# Patient Record
Sex: FEMALE | Race: WHITE | NOT HISPANIC OR LATINO | Employment: OTHER | URBAN - METROPOLITAN AREA
[De-identification: names, ages, dates, MRNs, and addresses within clinical notes are randomized per-mention and may not be internally consistent; named-entity substitution may affect disease eponyms.]

---

## 2021-05-13 ENCOUNTER — TELEPHONE (OUTPATIENT)
Dept: INTERNAL MEDICINE | Facility: CLINIC | Age: 61
End: 2021-05-13

## 2021-05-18 ENCOUNTER — LAB VISIT (OUTPATIENT)
Dept: LAB | Facility: HOSPITAL | Age: 61
End: 2021-05-18
Attending: FAMILY MEDICINE
Payer: COMMERCIAL

## 2021-05-18 ENCOUNTER — OFFICE VISIT (OUTPATIENT)
Dept: INTERNAL MEDICINE | Facility: CLINIC | Age: 61
End: 2021-05-18
Payer: COMMERCIAL

## 2021-05-18 VITALS
WEIGHT: 143.94 LBS | OXYGEN SATURATION: 96 % | HEART RATE: 98 BPM | SYSTOLIC BLOOD PRESSURE: 121 MMHG | DIASTOLIC BLOOD PRESSURE: 80 MMHG | TEMPERATURE: 98 F

## 2021-05-18 DIAGNOSIS — E78.5 HYPERLIPIDEMIA, UNSPECIFIED HYPERLIPIDEMIA TYPE: ICD-10-CM

## 2021-05-18 DIAGNOSIS — G40.909 SEIZURE DISORDER: ICD-10-CM

## 2021-05-18 DIAGNOSIS — F41.1 GAD (GENERALIZED ANXIETY DISORDER): ICD-10-CM

## 2021-05-18 DIAGNOSIS — J44.9 CHRONIC OBSTRUCTIVE PULMONARY DISEASE, UNSPECIFIED COPD TYPE: ICD-10-CM

## 2021-05-18 DIAGNOSIS — E87.5 HYPERKALEMIA: ICD-10-CM

## 2021-05-18 DIAGNOSIS — I10 HYPERTENSION, UNSPECIFIED TYPE: Primary | ICD-10-CM

## 2021-05-18 LAB
ALBUMIN SERPL BCP-MCNC: 3.6 G/DL (ref 3.5–5.2)
ALP SERPL-CCNC: 113 U/L (ref 55–135)
ALT SERPL W/O P-5'-P-CCNC: 26 U/L (ref 10–44)
ANION GAP SERPL CALC-SCNC: 11 MMOL/L (ref 8–16)
AST SERPL-CCNC: 18 U/L (ref 10–40)
BILIRUB SERPL-MCNC: 0.1 MG/DL (ref 0.1–1)
BUN SERPL-MCNC: 12 MG/DL (ref 8–23)
CALCIUM SERPL-MCNC: 9.7 MG/DL (ref 8.7–10.5)
CHLORIDE SERPL-SCNC: 99 MMOL/L (ref 95–110)
CO2 SERPL-SCNC: 26 MMOL/L (ref 23–29)
CREAT SERPL-MCNC: 0.7 MG/DL (ref 0.5–1.4)
EST. GFR  (AFRICAN AMERICAN): >60 ML/MIN/1.73 M^2
EST. GFR  (NON AFRICAN AMERICAN): >60 ML/MIN/1.73 M^2
GLUCOSE SERPL-MCNC: 85 MG/DL (ref 70–110)
POTASSIUM SERPL-SCNC: 4.5 MMOL/L (ref 3.5–5.1)
PROT SERPL-MCNC: 7.2 G/DL (ref 6–8.4)
SODIUM SERPL-SCNC: 136 MMOL/L (ref 136–145)

## 2021-05-18 PROCEDURE — 3079F DIAST BP 80-89 MM HG: CPT | Mod: CPTII,S$GLB,, | Performed by: FAMILY MEDICINE

## 2021-05-18 PROCEDURE — 1126F PR PAIN SEVERITY QUANTIFIED, NO PAIN PRESENT: ICD-10-PCS | Mod: S$GLB,,, | Performed by: FAMILY MEDICINE

## 2021-05-18 PROCEDURE — 99999 PR PBB SHADOW E&M-EST. PATIENT-LVL IV: CPT | Mod: PBBFAC,,, | Performed by: FAMILY MEDICINE

## 2021-05-18 PROCEDURE — 3074F SYST BP LT 130 MM HG: CPT | Mod: CPTII,S$GLB,, | Performed by: FAMILY MEDICINE

## 2021-05-18 PROCEDURE — 80053 COMPREHEN METABOLIC PANEL: CPT | Performed by: FAMILY MEDICINE

## 2021-05-18 PROCEDURE — 3079F PR MOST RECENT DIASTOLIC BLOOD PRESSURE 80-89 MM HG: ICD-10-PCS | Mod: CPTII,S$GLB,, | Performed by: FAMILY MEDICINE

## 2021-05-18 PROCEDURE — 36415 COLL VENOUS BLD VENIPUNCTURE: CPT | Mod: PO | Performed by: FAMILY MEDICINE

## 2021-05-18 PROCEDURE — 99999 PR PBB SHADOW E&M-EST. PATIENT-LVL IV: ICD-10-PCS | Mod: PBBFAC,,, | Performed by: FAMILY MEDICINE

## 2021-05-18 PROCEDURE — 3074F PR MOST RECENT SYSTOLIC BLOOD PRESSURE < 130 MM HG: ICD-10-PCS | Mod: CPTII,S$GLB,, | Performed by: FAMILY MEDICINE

## 2021-05-18 PROCEDURE — 1126F AMNT PAIN NOTED NONE PRSNT: CPT | Mod: S$GLB,,, | Performed by: FAMILY MEDICINE

## 2021-05-18 PROCEDURE — 99204 PR OFFICE/OUTPT VISIT, NEW, LEVL IV, 45-59 MIN: ICD-10-PCS | Mod: S$GLB,,, | Performed by: FAMILY MEDICINE

## 2021-05-18 PROCEDURE — 99204 OFFICE O/P NEW MOD 45 MIN: CPT | Mod: S$GLB,,, | Performed by: FAMILY MEDICINE

## 2021-05-18 RX ORDER — CLOPIDOGREL BISULFATE 75 MG/1
75 TABLET ORAL
COMMUNITY
Start: 2021-01-19 | End: 2021-11-18 | Stop reason: SDUPTHER

## 2021-05-18 RX ORDER — CARBAMAZEPINE 200 MG/1
200 TABLET ORAL 2 TIMES DAILY
COMMUNITY
Start: 2021-03-29

## 2021-05-18 RX ORDER — BIMATOPROST 0.3 MG/ML
1 SOLUTION/ DROPS OPHTHALMIC
COMMUNITY

## 2021-05-18 RX ORDER — ESTERIFIED ESTROGEN AND METHYLTESTOSTERONE .625; 1.25 MG/1; MG/1
1 TABLET ORAL
COMMUNITY
End: 2021-05-18

## 2021-05-18 RX ORDER — BUDESONIDE AND FORMOTEROL FUMARATE DIHYDRATE 160; 4.5 UG/1; UG/1
2 AEROSOL RESPIRATORY (INHALATION)
COMMUNITY
End: 2021-08-24 | Stop reason: SDUPTHER

## 2021-05-18 RX ORDER — SERTRALINE HYDROCHLORIDE 50 MG/1
50 TABLET, FILM COATED ORAL DAILY
Qty: 30 TABLET | Refills: 1 | Status: SHIPPED | OUTPATIENT
Start: 2021-05-18 | End: 2021-06-18

## 2021-05-18 RX ORDER — LINACLOTIDE 290 UG/1
290 CAPSULE, GELATIN COATED ORAL DAILY
COMMUNITY
Start: 2021-05-13 | End: 2021-10-19 | Stop reason: SDUPTHER

## 2021-05-18 RX ORDER — LISINOPRIL AND HYDROCHLOROTHIAZIDE 10; 12.5 MG/1; MG/1
1 TABLET ORAL DAILY
COMMUNITY
Start: 2021-04-12 | End: 2022-12-12 | Stop reason: SDUPTHER

## 2021-05-18 RX ORDER — CLONAZEPAM 1 MG/1
1 TABLET ORAL 2 TIMES DAILY PRN
Qty: 30 TABLET | Refills: 0 | Status: SHIPPED | OUTPATIENT
Start: 2021-05-18 | End: 2022-12-12 | Stop reason: SDUPTHER

## 2021-05-18 RX ORDER — CLONAZEPAM 1 MG/1
1 TABLET ORAL 2 TIMES DAILY PRN
COMMUNITY
Start: 2021-03-22 | End: 2021-05-18 | Stop reason: SDUPTHER

## 2021-05-18 RX ORDER — ALBUTEROL SULFATE 90 UG/1
AEROSOL, METERED RESPIRATORY (INHALATION)
COMMUNITY
End: 2021-08-24 | Stop reason: SDUPTHER

## 2021-05-18 RX ORDER — ROSUVASTATIN CALCIUM 40 MG/1
40 TABLET, COATED ORAL
COMMUNITY
Start: 2021-03-02 | End: 2022-12-12 | Stop reason: SDUPTHER

## 2021-05-18 RX ORDER — VERAPAMIL HYDROCHLORIDE 120 MG/1
TABLET, FILM COATED, EXTENDED RELEASE ORAL
COMMUNITY
Start: 2020-07-01 | End: 2021-06-18 | Stop reason: SDUPTHER

## 2021-05-19 ENCOUNTER — TELEPHONE (OUTPATIENT)
Dept: INTERNAL MEDICINE | Facility: CLINIC | Age: 61
End: 2021-05-19

## 2021-05-26 ENCOUNTER — PATIENT OUTREACH (OUTPATIENT)
Dept: ADMINISTRATIVE | Facility: HOSPITAL | Age: 61
End: 2021-05-26

## 2021-06-18 ENCOUNTER — OFFICE VISIT (OUTPATIENT)
Dept: INTERNAL MEDICINE | Facility: CLINIC | Age: 61
End: 2021-06-18
Payer: COMMERCIAL

## 2021-06-18 VITALS
DIASTOLIC BLOOD PRESSURE: 86 MMHG | BODY MASS INDEX: 27.52 KG/M2 | HEART RATE: 88 BPM | SYSTOLIC BLOOD PRESSURE: 136 MMHG | RESPIRATION RATE: 18 BRPM | WEIGHT: 140.19 LBS | TEMPERATURE: 98 F | HEIGHT: 60 IN

## 2021-06-18 DIAGNOSIS — J44.9 CHRONIC OBSTRUCTIVE PULMONARY DISEASE, UNSPECIFIED COPD TYPE: ICD-10-CM

## 2021-06-18 DIAGNOSIS — I10 ESSENTIAL HYPERTENSION: ICD-10-CM

## 2021-06-18 DIAGNOSIS — K58.1 IRRITABLE BOWEL SYNDROME WITH CONSTIPATION: ICD-10-CM

## 2021-06-18 DIAGNOSIS — F41.1 GAD (GENERALIZED ANXIETY DISORDER): Primary | ICD-10-CM

## 2021-06-18 PROCEDURE — 99214 PR OFFICE/OUTPT VISIT, EST, LEVL IV, 30-39 MIN: ICD-10-PCS | Mod: S$GLB,,, | Performed by: NURSE PRACTITIONER

## 2021-06-18 PROCEDURE — 3008F PR BODY MASS INDEX (BMI) DOCUMENTED: ICD-10-PCS | Mod: CPTII,S$GLB,, | Performed by: NURSE PRACTITIONER

## 2021-06-18 PROCEDURE — 99999 PR PBB SHADOW E&M-EST. PATIENT-LVL IV: ICD-10-PCS | Mod: PBBFAC,,, | Performed by: NURSE PRACTITIONER

## 2021-06-18 PROCEDURE — 1126F PR PAIN SEVERITY QUANTIFIED, NO PAIN PRESENT: ICD-10-PCS | Mod: S$GLB,,, | Performed by: NURSE PRACTITIONER

## 2021-06-18 PROCEDURE — 1126F AMNT PAIN NOTED NONE PRSNT: CPT | Mod: S$GLB,,, | Performed by: NURSE PRACTITIONER

## 2021-06-18 PROCEDURE — 99214 OFFICE O/P EST MOD 30 MIN: CPT | Mod: S$GLB,,, | Performed by: NURSE PRACTITIONER

## 2021-06-18 PROCEDURE — 99999 PR PBB SHADOW E&M-EST. PATIENT-LVL IV: CPT | Mod: PBBFAC,,, | Performed by: NURSE PRACTITIONER

## 2021-06-18 PROCEDURE — 3008F BODY MASS INDEX DOCD: CPT | Mod: CPTII,S$GLB,, | Performed by: NURSE PRACTITIONER

## 2021-06-18 RX ORDER — VERAPAMIL HYDROCHLORIDE 120 MG/1
120 TABLET, FILM COATED, EXTENDED RELEASE ORAL DAILY
Status: CANCELLED | OUTPATIENT
Start: 2021-06-18

## 2021-06-18 RX ORDER — SERTRALINE HYDROCHLORIDE 100 MG/1
100 TABLET, FILM COATED ORAL DAILY
Qty: 30 TABLET | Refills: 1 | Status: SHIPPED | OUTPATIENT
Start: 2021-06-18 | End: 2021-08-06

## 2021-06-18 RX ORDER — VERAPAMIL HYDROCHLORIDE 120 MG/1
120 TABLET, FILM COATED, EXTENDED RELEASE ORAL DAILY
Qty: 90 TABLET | Refills: 3 | Status: SHIPPED | OUTPATIENT
Start: 2021-06-18 | End: 2022-06-06 | Stop reason: SDUPTHER

## 2021-08-11 ENCOUNTER — OFFICE VISIT (OUTPATIENT)
Dept: INTERNAL MEDICINE | Facility: CLINIC | Age: 61
End: 2021-08-11
Payer: COMMERCIAL

## 2021-08-11 VITALS
DIASTOLIC BLOOD PRESSURE: 78 MMHG | TEMPERATURE: 97 F | OXYGEN SATURATION: 97 % | SYSTOLIC BLOOD PRESSURE: 122 MMHG | BODY MASS INDEX: 26.35 KG/M2 | HEART RATE: 78 BPM | WEIGHT: 134.94 LBS

## 2021-08-11 DIAGNOSIS — Z51.81 MEDICATION MONITORING ENCOUNTER: ICD-10-CM

## 2021-08-11 DIAGNOSIS — Z00.00 ANNUAL PHYSICAL EXAM: ICD-10-CM

## 2021-08-11 DIAGNOSIS — G40.909 SEIZURE DISORDER: ICD-10-CM

## 2021-08-11 DIAGNOSIS — E78.5 HYPERLIPIDEMIA, UNSPECIFIED HYPERLIPIDEMIA TYPE: ICD-10-CM

## 2021-08-11 DIAGNOSIS — I10 HYPERTENSION, UNSPECIFIED TYPE: ICD-10-CM

## 2021-08-11 DIAGNOSIS — F41.1 GAD (GENERALIZED ANXIETY DISORDER): Primary | ICD-10-CM

## 2021-08-11 PROCEDURE — 3008F BODY MASS INDEX DOCD: CPT | Mod: CPTII,S$GLB,, | Performed by: FAMILY MEDICINE

## 2021-08-11 PROCEDURE — 99999 PR PBB SHADOW E&M-EST. PATIENT-LVL IV: ICD-10-PCS | Mod: PBBFAC,,, | Performed by: FAMILY MEDICINE

## 2021-08-11 PROCEDURE — 3078F DIAST BP <80 MM HG: CPT | Mod: CPTII,S$GLB,, | Performed by: FAMILY MEDICINE

## 2021-08-11 PROCEDURE — 1126F PR PAIN SEVERITY QUANTIFIED, NO PAIN PRESENT: ICD-10-PCS | Mod: CPTII,S$GLB,, | Performed by: FAMILY MEDICINE

## 2021-08-11 PROCEDURE — 1159F MED LIST DOCD IN RCRD: CPT | Mod: CPTII,S$GLB,, | Performed by: FAMILY MEDICINE

## 2021-08-11 PROCEDURE — 1160F RVW MEDS BY RX/DR IN RCRD: CPT | Mod: CPTII,S$GLB,, | Performed by: FAMILY MEDICINE

## 2021-08-11 PROCEDURE — 1160F PR REVIEW ALL MEDS BY PRESCRIBER/CLIN PHARMACIST DOCUMENTED: ICD-10-PCS | Mod: CPTII,S$GLB,, | Performed by: FAMILY MEDICINE

## 2021-08-11 PROCEDURE — 3074F SYST BP LT 130 MM HG: CPT | Mod: CPTII,S$GLB,, | Performed by: FAMILY MEDICINE

## 2021-08-11 PROCEDURE — 3008F PR BODY MASS INDEX (BMI) DOCUMENTED: ICD-10-PCS | Mod: CPTII,S$GLB,, | Performed by: FAMILY MEDICINE

## 2021-08-11 PROCEDURE — 3074F PR MOST RECENT SYSTOLIC BLOOD PRESSURE < 130 MM HG: ICD-10-PCS | Mod: CPTII,S$GLB,, | Performed by: FAMILY MEDICINE

## 2021-08-11 PROCEDURE — 99999 PR PBB SHADOW E&M-EST. PATIENT-LVL IV: CPT | Mod: PBBFAC,,, | Performed by: FAMILY MEDICINE

## 2021-08-11 PROCEDURE — 1159F PR MEDICATION LIST DOCUMENTED IN MEDICAL RECORD: ICD-10-PCS | Mod: CPTII,S$GLB,, | Performed by: FAMILY MEDICINE

## 2021-08-11 PROCEDURE — 99214 PR OFFICE/OUTPT VISIT, EST, LEVL IV, 30-39 MIN: ICD-10-PCS | Mod: S$GLB,,, | Performed by: FAMILY MEDICINE

## 2021-08-11 PROCEDURE — 99214 OFFICE O/P EST MOD 30 MIN: CPT | Mod: S$GLB,,, | Performed by: FAMILY MEDICINE

## 2021-08-11 PROCEDURE — 1126F AMNT PAIN NOTED NONE PRSNT: CPT | Mod: CPTII,S$GLB,, | Performed by: FAMILY MEDICINE

## 2021-08-11 PROCEDURE — 3078F PR MOST RECENT DIASTOLIC BLOOD PRESSURE < 80 MM HG: ICD-10-PCS | Mod: CPTII,S$GLB,, | Performed by: FAMILY MEDICINE

## 2021-08-11 RX ORDER — SERTRALINE HYDROCHLORIDE 100 MG/1
100 TABLET, FILM COATED ORAL DAILY
Qty: 90 TABLET | Refills: 2 | Status: SHIPPED | OUTPATIENT
Start: 2021-08-11 | End: 2022-06-06

## 2021-08-13 ENCOUNTER — IMMUNIZATION (OUTPATIENT)
Dept: PRIMARY CARE CLINIC | Facility: CLINIC | Age: 61
End: 2021-08-13

## 2021-08-13 DIAGNOSIS — Z23 NEED FOR VACCINATION: Primary | ICD-10-CM

## 2021-08-13 PROCEDURE — 0001A COVID-19, MRNA, LNP-S, PF, 30 MCG/0.3 ML DOSE VACCINE: CPT | Mod: CV19,S$GLB,, | Performed by: FAMILY MEDICINE

## 2021-08-13 PROCEDURE — 91300 COVID-19, MRNA, LNP-S, PF, 30 MCG/0.3 ML DOSE VACCINE: ICD-10-PCS | Mod: S$GLB,,, | Performed by: FAMILY MEDICINE

## 2021-08-13 PROCEDURE — 0001A COVID-19, MRNA, LNP-S, PF, 30 MCG/0.3 ML DOSE VACCINE: ICD-10-PCS | Mod: CV19,S$GLB,, | Performed by: FAMILY MEDICINE

## 2021-08-13 PROCEDURE — 91300 COVID-19, MRNA, LNP-S, PF, 30 MCG/0.3 ML DOSE VACCINE: CPT | Mod: S$GLB,,, | Performed by: FAMILY MEDICINE

## 2021-08-18 ENCOUNTER — LAB VISIT (OUTPATIENT)
Dept: LAB | Facility: HOSPITAL | Age: 61
End: 2021-08-18
Attending: FAMILY MEDICINE
Payer: COMMERCIAL

## 2021-08-18 DIAGNOSIS — Z00.00 ANNUAL PHYSICAL EXAM: ICD-10-CM

## 2021-08-18 DIAGNOSIS — G40.909 SEIZURE DISORDER: ICD-10-CM

## 2021-08-18 DIAGNOSIS — Z51.81 MEDICATION MONITORING ENCOUNTER: ICD-10-CM

## 2021-08-18 LAB
BASOPHILS # BLD AUTO: 0.03 K/UL (ref 0–0.2)
BASOPHILS NFR BLD: 0.4 % (ref 0–1.9)
DIFFERENTIAL METHOD: ABNORMAL
EOSINOPHIL # BLD AUTO: 0.1 K/UL (ref 0–0.5)
EOSINOPHIL NFR BLD: 1.5 % (ref 0–8)
ERYTHROCYTE [DISTWIDTH] IN BLOOD BY AUTOMATED COUNT: 14.2 % (ref 11.5–14.5)
ESTIMATED AVG GLUCOSE: 94 MG/DL (ref 68–131)
HBA1C MFR BLD: 4.9 % (ref 4–5.6)
HCT VFR BLD AUTO: 44.9 % (ref 37–48.5)
HGB BLD-MCNC: 15.5 G/DL (ref 12–16)
IMM GRANULOCYTES # BLD AUTO: 0.02 K/UL (ref 0–0.04)
IMM GRANULOCYTES NFR BLD AUTO: 0.3 % (ref 0–0.5)
LYMPHOCYTES # BLD AUTO: 1.8 K/UL (ref 1–4.8)
LYMPHOCYTES NFR BLD: 24.9 % (ref 18–48)
MCH RBC QN AUTO: 32.4 PG (ref 27–31)
MCHC RBC AUTO-ENTMCNC: 34.5 G/DL (ref 32–36)
MCV RBC AUTO: 94 FL (ref 82–98)
MONOCYTES # BLD AUTO: 0.5 K/UL (ref 0.3–1)
MONOCYTES NFR BLD: 6.9 % (ref 4–15)
NEUTROPHILS # BLD AUTO: 4.7 K/UL (ref 1.8–7.7)
NEUTROPHILS NFR BLD: 66 % (ref 38–73)
NRBC BLD-RTO: 0 /100 WBC
PLATELET # BLD AUTO: 338 K/UL (ref 150–450)
PMV BLD AUTO: 10.8 FL (ref 9.2–12.9)
RBC # BLD AUTO: 4.78 M/UL (ref 4–5.4)
WBC # BLD AUTO: 7.12 K/UL (ref 3.9–12.7)

## 2021-08-18 PROCEDURE — 80053 COMPREHEN METABOLIC PANEL: CPT | Performed by: FAMILY MEDICINE

## 2021-08-18 PROCEDURE — 84443 ASSAY THYROID STIM HORMONE: CPT | Performed by: FAMILY MEDICINE

## 2021-08-18 PROCEDURE — 87389 HIV-1 AG W/HIV-1&-2 AB AG IA: CPT | Performed by: FAMILY MEDICINE

## 2021-08-18 PROCEDURE — 82306 VITAMIN D 25 HYDROXY: CPT | Performed by: FAMILY MEDICINE

## 2021-08-18 PROCEDURE — 80156 ASSAY CARBAMAZEPINE TOTAL: CPT | Performed by: FAMILY MEDICINE

## 2021-08-18 PROCEDURE — 83036 HEMOGLOBIN GLYCOSYLATED A1C: CPT | Performed by: FAMILY MEDICINE

## 2021-08-18 PROCEDURE — 36415 COLL VENOUS BLD VENIPUNCTURE: CPT | Mod: PO | Performed by: FAMILY MEDICINE

## 2021-08-18 PROCEDURE — 86803 HEPATITIS C AB TEST: CPT | Performed by: FAMILY MEDICINE

## 2021-08-18 PROCEDURE — 85025 COMPLETE CBC W/AUTO DIFF WBC: CPT | Performed by: FAMILY MEDICINE

## 2021-08-18 PROCEDURE — 80061 LIPID PANEL: CPT | Performed by: FAMILY MEDICINE

## 2021-08-19 LAB
25(OH)D3+25(OH)D2 SERPL-MCNC: 42 NG/ML (ref 30–96)
ALBUMIN SERPL BCP-MCNC: 3.8 G/DL (ref 3.5–5.2)
ALP SERPL-CCNC: 111 U/L (ref 55–135)
ALT SERPL W/O P-5'-P-CCNC: 24 U/L (ref 10–44)
ANION GAP SERPL CALC-SCNC: 11 MMOL/L (ref 8–16)
AST SERPL-CCNC: 18 U/L (ref 10–40)
BILIRUB SERPL-MCNC: 0.3 MG/DL (ref 0.1–1)
BUN SERPL-MCNC: 12 MG/DL (ref 8–23)
CALCIUM SERPL-MCNC: 10.1 MG/DL (ref 8.7–10.5)
CARBAMAZEPINE SERPL-MCNC: 6.3 UG/ML (ref 4–12)
CHLORIDE SERPL-SCNC: 103 MMOL/L (ref 95–110)
CHOLEST SERPL-MCNC: 229 MG/DL (ref 120–199)
CHOLEST/HDLC SERPL: 5.9 {RATIO} (ref 2–5)
CO2 SERPL-SCNC: 26 MMOL/L (ref 23–29)
CREAT SERPL-MCNC: 0.7 MG/DL (ref 0.5–1.4)
EST. GFR  (AFRICAN AMERICAN): >60 ML/MIN/1.73 M^2
EST. GFR  (NON AFRICAN AMERICAN): >60 ML/MIN/1.73 M^2
GLUCOSE SERPL-MCNC: 82 MG/DL (ref 70–110)
HCV AB SERPL QL IA: NEGATIVE
HDLC SERPL-MCNC: 39 MG/DL (ref 40–75)
HDLC SERPL: 17 % (ref 20–50)
HIV 1+2 AB+HIV1 P24 AG SERPL QL IA: NEGATIVE
LDLC SERPL CALC-MCNC: 152 MG/DL (ref 63–159)
NONHDLC SERPL-MCNC: 190 MG/DL
POTASSIUM SERPL-SCNC: 4.5 MMOL/L (ref 3.5–5.1)
PROT SERPL-MCNC: 7.4 G/DL (ref 6–8.4)
SODIUM SERPL-SCNC: 140 MMOL/L (ref 136–145)
TRIGL SERPL-MCNC: 190 MG/DL (ref 30–150)
TSH SERPL DL<=0.005 MIU/L-ACNC: 2.72 UIU/ML (ref 0.4–4)

## 2021-08-24 ENCOUNTER — TELEPHONE (OUTPATIENT)
Dept: INTERNAL MEDICINE | Facility: CLINIC | Age: 61
End: 2021-08-24

## 2021-08-24 ENCOUNTER — OFFICE VISIT (OUTPATIENT)
Dept: INTERNAL MEDICINE | Facility: CLINIC | Age: 61
End: 2021-08-24
Payer: COMMERCIAL

## 2021-08-24 DIAGNOSIS — I10 HYPERTENSION, UNSPECIFIED TYPE: ICD-10-CM

## 2021-08-24 DIAGNOSIS — F41.1 GAD (GENERALIZED ANXIETY DISORDER): ICD-10-CM

## 2021-08-24 DIAGNOSIS — J06.9 VIRAL URI WITH COUGH: Primary | ICD-10-CM

## 2021-08-24 DIAGNOSIS — J44.9 CHRONIC OBSTRUCTIVE PULMONARY DISEASE, UNSPECIFIED COPD TYPE: ICD-10-CM

## 2021-08-24 DIAGNOSIS — E78.5 HYPERLIPIDEMIA, UNSPECIFIED HYPERLIPIDEMIA TYPE: ICD-10-CM

## 2021-08-24 PROCEDURE — 3044F PR MOST RECENT HEMOGLOBIN A1C LEVEL <7.0%: ICD-10-PCS | Mod: CPTII,,, | Performed by: NURSE PRACTITIONER

## 2021-08-24 PROCEDURE — 1160F RVW MEDS BY RX/DR IN RCRD: CPT | Mod: CPTII,,, | Performed by: NURSE PRACTITIONER

## 2021-08-24 PROCEDURE — 3044F HG A1C LEVEL LT 7.0%: CPT | Mod: CPTII,,, | Performed by: NURSE PRACTITIONER

## 2021-08-24 PROCEDURE — 99214 OFFICE O/P EST MOD 30 MIN: CPT | Mod: 95,,, | Performed by: NURSE PRACTITIONER

## 2021-08-24 PROCEDURE — 1159F PR MEDICATION LIST DOCUMENTED IN MEDICAL RECORD: ICD-10-PCS | Mod: CPTII,,, | Performed by: NURSE PRACTITIONER

## 2021-08-24 PROCEDURE — 99214 PR OFFICE/OUTPT VISIT, EST, LEVL IV, 30-39 MIN: ICD-10-PCS | Mod: 95,,, | Performed by: NURSE PRACTITIONER

## 2021-08-24 PROCEDURE — 1160F PR REVIEW ALL MEDS BY PRESCRIBER/CLIN PHARMACIST DOCUMENTED: ICD-10-PCS | Mod: CPTII,,, | Performed by: NURSE PRACTITIONER

## 2021-08-24 PROCEDURE — 1159F MED LIST DOCD IN RCRD: CPT | Mod: CPTII,,, | Performed by: NURSE PRACTITIONER

## 2021-08-24 RX ORDER — ALBUTEROL SULFATE 90 UG/1
2 AEROSOL, METERED RESPIRATORY (INHALATION) EVERY 4 HOURS PRN
Qty: 18 G | Refills: 2 | Status: SHIPPED | OUTPATIENT
Start: 2021-08-24 | End: 2021-11-08

## 2021-08-24 RX ORDER — PROMETHAZINE HYDROCHLORIDE AND DEXTROMETHORPHAN HYDROBROMIDE 6.25; 15 MG/5ML; MG/5ML
5 SYRUP ORAL NIGHTLY PRN
Qty: 120 ML | Refills: 0 | Status: SHIPPED | OUTPATIENT
Start: 2021-08-24 | End: 2022-02-11 | Stop reason: SDUPTHER

## 2021-08-24 RX ORDER — BUDESONIDE AND FORMOTEROL FUMARATE DIHYDRATE 160; 4.5 UG/1; UG/1
2 AEROSOL RESPIRATORY (INHALATION) EVERY 12 HOURS
Qty: 10.2 G | Refills: 11 | Status: SHIPPED | OUTPATIENT
Start: 2021-08-24 | End: 2022-12-12 | Stop reason: SDUPTHER

## 2021-08-25 ENCOUNTER — LAB VISIT (OUTPATIENT)
Dept: PRIMARY CARE CLINIC | Facility: OTHER | Age: 61
End: 2021-08-25
Payer: COMMERCIAL

## 2021-08-25 DIAGNOSIS — R05.9 COUGH: ICD-10-CM

## 2021-08-25 PROCEDURE — U0003 INFECTIOUS AGENT DETECTION BY NUCLEIC ACID (DNA OR RNA); SEVERE ACUTE RESPIRATORY SYNDROME CORONAVIRUS 2 (SARS-COV-2) (CORONAVIRUS DISEASE [COVID-19]), AMPLIFIED PROBE TECHNIQUE, MAKING USE OF HIGH THROUGHPUT TECHNOLOGIES AS DESCRIBED BY CMS-2020-01-R: HCPCS

## 2021-08-27 LAB — SARS-COV-2 RNA RESP QL NAA+PROBE: NOT DETECTED

## 2021-09-08 ENCOUNTER — IMMUNIZATION (OUTPATIENT)
Dept: PRIMARY CARE CLINIC | Facility: CLINIC | Age: 61
End: 2021-09-08
Payer: COMMERCIAL

## 2021-09-08 DIAGNOSIS — Z23 NEED FOR VACCINATION: Primary | ICD-10-CM

## 2021-09-08 PROCEDURE — 0002A COVID-19, MRNA, LNP-S, PF, 30 MCG/0.3 ML DOSE VACCINE: ICD-10-PCS | Mod: CV19,S$GLB,, | Performed by: FAMILY MEDICINE

## 2021-09-08 PROCEDURE — 91300 COVID-19, MRNA, LNP-S, PF, 30 MCG/0.3 ML DOSE VACCINE: ICD-10-PCS | Mod: S$GLB,,, | Performed by: FAMILY MEDICINE

## 2021-09-08 PROCEDURE — 91300 COVID-19, MRNA, LNP-S, PF, 30 MCG/0.3 ML DOSE VACCINE: CPT | Mod: S$GLB,,, | Performed by: FAMILY MEDICINE

## 2021-09-08 PROCEDURE — 0002A COVID-19, MRNA, LNP-S, PF, 30 MCG/0.3 ML DOSE VACCINE: CPT | Mod: CV19,S$GLB,, | Performed by: FAMILY MEDICINE

## 2021-10-19 ENCOUNTER — PATIENT MESSAGE (OUTPATIENT)
Dept: INTERNAL MEDICINE | Facility: CLINIC | Age: 61
End: 2021-10-19
Payer: COMMERCIAL

## 2021-10-19 RX ORDER — LINACLOTIDE 290 UG/1
290 CAPSULE, GELATIN COATED ORAL DAILY
Qty: 30 CAPSULE | Refills: 3 | Status: SHIPPED | OUTPATIENT
Start: 2021-10-19 | End: 2021-11-18 | Stop reason: SDUPTHER

## 2021-11-18 ENCOUNTER — OFFICE VISIT (OUTPATIENT)
Dept: INTERNAL MEDICINE | Facility: CLINIC | Age: 61
End: 2021-11-18
Payer: COMMERCIAL

## 2021-11-18 VITALS
OXYGEN SATURATION: 96 % | BODY MASS INDEX: 25.84 KG/M2 | DIASTOLIC BLOOD PRESSURE: 78 MMHG | HEIGHT: 60 IN | SYSTOLIC BLOOD PRESSURE: 122 MMHG | HEART RATE: 80 BPM | WEIGHT: 131.63 LBS | TEMPERATURE: 98 F

## 2021-11-18 DIAGNOSIS — Z12.2 SCREENING FOR LUNG CANCER: ICD-10-CM

## 2021-11-18 DIAGNOSIS — E78.5 HYPERLIPIDEMIA, UNSPECIFIED HYPERLIPIDEMIA TYPE: ICD-10-CM

## 2021-11-18 DIAGNOSIS — Z00.00 ANNUAL PHYSICAL EXAM: Primary | ICD-10-CM

## 2021-11-18 DIAGNOSIS — F17.200 SMOKER: ICD-10-CM

## 2021-11-18 PROCEDURE — 3044F PR MOST RECENT HEMOGLOBIN A1C LEVEL <7.0%: ICD-10-PCS | Mod: CPTII,S$GLB,, | Performed by: FAMILY MEDICINE

## 2021-11-18 PROCEDURE — 90750 HZV VACC RECOMBINANT IM: CPT | Mod: S$GLB,,, | Performed by: FAMILY MEDICINE

## 2021-11-18 PROCEDURE — 3044F HG A1C LEVEL LT 7.0%: CPT | Mod: CPTII,S$GLB,, | Performed by: FAMILY MEDICINE

## 2021-11-18 PROCEDURE — 3008F BODY MASS INDEX DOCD: CPT | Mod: CPTII,S$GLB,, | Performed by: FAMILY MEDICINE

## 2021-11-18 PROCEDURE — 3008F PR BODY MASS INDEX (BMI) DOCUMENTED: ICD-10-PCS | Mod: CPTII,S$GLB,, | Performed by: FAMILY MEDICINE

## 2021-11-18 PROCEDURE — 99999 PR PBB SHADOW E&M-EST. PATIENT-LVL IV: CPT | Mod: PBBFAC,,, | Performed by: FAMILY MEDICINE

## 2021-11-18 PROCEDURE — 1159F PR MEDICATION LIST DOCUMENTED IN MEDICAL RECORD: ICD-10-PCS | Mod: CPTII,S$GLB,, | Performed by: FAMILY MEDICINE

## 2021-11-18 PROCEDURE — 99396 PREV VISIT EST AGE 40-64: CPT | Mod: 25,S$GLB,, | Performed by: FAMILY MEDICINE

## 2021-11-18 PROCEDURE — 99396 PR PREVENTIVE VISIT,EST,40-64: ICD-10-PCS | Mod: 25,S$GLB,, | Performed by: FAMILY MEDICINE

## 2021-11-18 PROCEDURE — 90471 IMMUNIZATION ADMIN: CPT | Mod: S$GLB,,, | Performed by: FAMILY MEDICINE

## 2021-11-18 PROCEDURE — 3078F PR MOST RECENT DIASTOLIC BLOOD PRESSURE < 80 MM HG: ICD-10-PCS | Mod: CPTII,S$GLB,, | Performed by: FAMILY MEDICINE

## 2021-11-18 PROCEDURE — 99999 PR PBB SHADOW E&M-EST. PATIENT-LVL IV: ICD-10-PCS | Mod: PBBFAC,,, | Performed by: FAMILY MEDICINE

## 2021-11-18 PROCEDURE — 4010F ACE/ARB THERAPY RXD/TAKEN: CPT | Mod: CPTII,S$GLB,, | Performed by: FAMILY MEDICINE

## 2021-11-18 PROCEDURE — 1159F MED LIST DOCD IN RCRD: CPT | Mod: CPTII,S$GLB,, | Performed by: FAMILY MEDICINE

## 2021-11-18 PROCEDURE — 3074F PR MOST RECENT SYSTOLIC BLOOD PRESSURE < 130 MM HG: ICD-10-PCS | Mod: CPTII,S$GLB,, | Performed by: FAMILY MEDICINE

## 2021-11-18 PROCEDURE — 90471 ZOSTER RECOMBINANT VACCINE: ICD-10-PCS | Mod: S$GLB,,, | Performed by: FAMILY MEDICINE

## 2021-11-18 PROCEDURE — 4010F PR ACE/ARB THEARPY RXD/TAKEN: ICD-10-PCS | Mod: CPTII,S$GLB,, | Performed by: FAMILY MEDICINE

## 2021-11-18 PROCEDURE — 3074F SYST BP LT 130 MM HG: CPT | Mod: CPTII,S$GLB,, | Performed by: FAMILY MEDICINE

## 2021-11-18 PROCEDURE — 3078F DIAST BP <80 MM HG: CPT | Mod: CPTII,S$GLB,, | Performed by: FAMILY MEDICINE

## 2021-11-18 PROCEDURE — 90750 ZOSTER RECOMBINANT VACCINE: ICD-10-PCS | Mod: S$GLB,,, | Performed by: FAMILY MEDICINE

## 2021-11-18 RX ORDER — LINACLOTIDE 290 UG/1
290 CAPSULE, GELATIN COATED ORAL DAILY
Qty: 90 CAPSULE | Refills: 3 | Status: SHIPPED | OUTPATIENT
Start: 2021-11-18 | End: 2022-12-12 | Stop reason: SDUPTHER

## 2021-11-18 RX ORDER — CLOPIDOGREL BISULFATE 75 MG/1
75 TABLET ORAL DAILY
Qty: 90 TABLET | Refills: 3 | Status: SHIPPED | OUTPATIENT
Start: 2021-11-18 | End: 2022-03-08 | Stop reason: SDUPTHER

## 2021-11-18 RX ORDER — LINACLOTIDE 290 UG/1
290 CAPSULE, GELATIN COATED ORAL DAILY
Qty: 30 CAPSULE | Refills: 3 | Status: SHIPPED | OUTPATIENT
Start: 2021-11-18 | End: 2021-11-18 | Stop reason: SDUPTHER

## 2021-11-18 RX ORDER — EZETIMIBE 10 MG/1
10 TABLET ORAL DAILY
Qty: 90 TABLET | Refills: 3 | Status: SHIPPED | OUTPATIENT
Start: 2021-11-18 | End: 2022-12-12 | Stop reason: SDUPTHER

## 2022-01-19 ENCOUNTER — TELEPHONE (OUTPATIENT)
Dept: INTERNAL MEDICINE | Facility: CLINIC | Age: 62
End: 2022-01-19
Payer: COMMERCIAL

## 2022-01-19 NOTE — TELEPHONE ENCOUNTER
----- Message from Flores Diamond sent at 1/19/2022 10:20 AM CST -----  Contact: self  Kenya Majano would like a call back at [phone], in regards to changing her pharmacy to the [facility] on [address] [facility] in [facility]. Phone # [phone]

## 2022-01-20 ENCOUNTER — TELEPHONE (OUTPATIENT)
Dept: INTERNAL MEDICINE | Facility: CLINIC | Age: 62
End: 2022-01-20
Payer: COMMERCIAL

## 2022-01-20 NOTE — TELEPHONE ENCOUNTER
----- Message from Maria T Alan sent at 1/20/2022  9:22 AM CST -----  Contact: pt  Per pt she needs a list for her visits starting 01/2021 and Chris doesn't go back that far, the pt want to be advised and can be reached at [phone]///[id number]

## 2022-01-20 NOTE — TELEPHONE ENCOUNTER
Patient stated she will call medical records to get a copy of her last year records for tax purpose

## 2022-02-11 ENCOUNTER — OFFICE VISIT (OUTPATIENT)
Dept: INTERNAL MEDICINE | Facility: CLINIC | Age: 62
End: 2022-02-11
Payer: MEDICARE

## 2022-02-11 VITALS
BODY MASS INDEX: 25.97 KG/M2 | HEIGHT: 60 IN | WEIGHT: 132.25 LBS | DIASTOLIC BLOOD PRESSURE: 80 MMHG | SYSTOLIC BLOOD PRESSURE: 130 MMHG | HEART RATE: 91 BPM | OXYGEN SATURATION: 98 % | TEMPERATURE: 98 F

## 2022-02-11 DIAGNOSIS — R05.9 COUGH: Primary | ICD-10-CM

## 2022-02-11 DIAGNOSIS — J06.9 VIRAL URI WITH COUGH: ICD-10-CM

## 2022-02-11 DIAGNOSIS — J44.9 CHRONIC OBSTRUCTIVE PULMONARY DISEASE, UNSPECIFIED COPD TYPE: ICD-10-CM

## 2022-02-11 LAB
CTP QC/QA: YES
SARS-COV-2 RDRP RESP QL NAA+PROBE: NEGATIVE

## 2022-02-11 PROCEDURE — 3008F PR BODY MASS INDEX (BMI) DOCUMENTED: ICD-10-PCS | Mod: CPTII,S$GLB,, | Performed by: FAMILY MEDICINE

## 2022-02-11 PROCEDURE — 1159F PR MEDICATION LIST DOCUMENTED IN MEDICAL RECORD: ICD-10-PCS | Mod: CPTII,S$GLB,, | Performed by: FAMILY MEDICINE

## 2022-02-11 PROCEDURE — 99999 PR PBB SHADOW E&M-EST. PATIENT-LVL IV: ICD-10-PCS | Mod: PBBFAC,,, | Performed by: FAMILY MEDICINE

## 2022-02-11 PROCEDURE — 99214 PR OFFICE/OUTPT VISIT, EST, LEVL IV, 30-39 MIN: ICD-10-PCS | Mod: S$GLB,,, | Performed by: FAMILY MEDICINE

## 2022-02-11 PROCEDURE — U0002: ICD-10-PCS | Mod: QW,S$GLB,, | Performed by: FAMILY MEDICINE

## 2022-02-11 PROCEDURE — 1160F PR REVIEW ALL MEDS BY PRESCRIBER/CLIN PHARMACIST DOCUMENTED: ICD-10-PCS | Mod: CPTII,S$GLB,, | Performed by: FAMILY MEDICINE

## 2022-02-11 PROCEDURE — 99214 OFFICE O/P EST MOD 30 MIN: CPT | Mod: S$GLB,,, | Performed by: FAMILY MEDICINE

## 2022-02-11 PROCEDURE — 3079F DIAST BP 80-89 MM HG: CPT | Mod: CPTII,S$GLB,, | Performed by: FAMILY MEDICINE

## 2022-02-11 PROCEDURE — 99999 PR PBB SHADOW E&M-EST. PATIENT-LVL IV: CPT | Mod: PBBFAC,,, | Performed by: FAMILY MEDICINE

## 2022-02-11 PROCEDURE — 1160F RVW MEDS BY RX/DR IN RCRD: CPT | Mod: CPTII,S$GLB,, | Performed by: FAMILY MEDICINE

## 2022-02-11 PROCEDURE — 3075F PR MOST RECENT SYSTOLIC BLOOD PRESS GE 130-139MM HG: ICD-10-PCS | Mod: CPTII,S$GLB,, | Performed by: FAMILY MEDICINE

## 2022-02-11 PROCEDURE — U0002 COVID-19 LAB TEST NON-CDC: HCPCS | Mod: QW,S$GLB,, | Performed by: FAMILY MEDICINE

## 2022-02-11 PROCEDURE — 1159F MED LIST DOCD IN RCRD: CPT | Mod: CPTII,S$GLB,, | Performed by: FAMILY MEDICINE

## 2022-02-11 PROCEDURE — 3008F BODY MASS INDEX DOCD: CPT | Mod: CPTII,S$GLB,, | Performed by: FAMILY MEDICINE

## 2022-02-11 PROCEDURE — 3075F SYST BP GE 130 - 139MM HG: CPT | Mod: CPTII,S$GLB,, | Performed by: FAMILY MEDICINE

## 2022-02-11 PROCEDURE — 3079F PR MOST RECENT DIASTOLIC BLOOD PRESSURE 80-89 MM HG: ICD-10-PCS | Mod: CPTII,S$GLB,, | Performed by: FAMILY MEDICINE

## 2022-02-11 RX ORDER — PROMETHAZINE HYDROCHLORIDE AND DEXTROMETHORPHAN HYDROBROMIDE 6.25; 15 MG/5ML; MG/5ML
5 SYRUP ORAL NIGHTLY PRN
Qty: 180 ML | Refills: 0 | Status: SHIPPED | OUTPATIENT
Start: 2022-02-11 | End: 2022-12-12 | Stop reason: SDUPTHER

## 2022-02-11 RX ORDER — PREDNISONE 20 MG/1
40 TABLET ORAL DAILY
Qty: 10 TABLET | Refills: 0 | Status: SHIPPED | OUTPATIENT
Start: 2022-02-11 | End: 2022-02-16

## 2022-02-11 RX ORDER — BENZONATATE 100 MG/1
100 CAPSULE ORAL 3 TIMES DAILY PRN
Qty: 30 CAPSULE | Refills: 0 | Status: SHIPPED | OUTPATIENT
Start: 2022-02-11 | End: 2022-02-21

## 2022-02-11 NOTE — PROGRESS NOTES
Subjective:      Patient ID: Kenya Majano is a 61 y.o. female.    Chief Complaint:  Cough/congestion    HPI 61 y.o.   female patient with a PMHx of anxiety, arthritis, asthma, CAD, HTN, Glaucoma, emphysema of lung, seizure, and stroke presents to clinic for sick visit.  Still smoking The patient was last seen on 2021      Today she reports that she is not feeling well. She endorses sore throat, congestion and productive cough. She states that she has not been COVID tested.   Symptoms present for past week.  Has emphysema    Patient otherwise has no complaints.     Past Medical History:   Diagnosis Date    Anxiety     Arthritis     Asthma     Cataract     Coronary artery disease     Emphysema of lung     Glaucoma     Hypertension     Seizures     Stroke      Family History   Problem Relation Age of Onset    Diabetes Mother     Stroke Mother     Heart disease Father     Diabetes Maternal Grandmother     No Known Problems Maternal Grandfather     Hearing loss Paternal Grandmother     Hearing loss Paternal Grandfather      Past Surgical History:   Procedure Laterality Date    APPENDECTOMY       SECTION      CHOLECYSTECTOMY      EYE SURGERY      HYSTERECTOMY       Social History     Tobacco Use    Smoking status: Current Every Day Smoker     Packs/day: 1.00     Years: 41.00     Pack years: 41.00     Types: Cigarettes    Smokeless tobacco: Never Used   Substance Use Topics    Alcohol use: Never    Drug use: Never       /80   Pulse 91   Temp 98.2 °F (36.8 °C)   Ht 5' (1.524 m)   Wt 60 kg (132 lb 4.4 oz)   LMP  (LMP Unknown)   SpO2 98%   BMI 25.83 kg/m²     Review of Systems   Constitutional: Negative for activity change, appetite change, chills, diaphoresis, fatigue, fever and unexpected weight change.   HENT: Positive for congestion and sore throat. Negative for ear pain, hearing loss, rhinorrhea, tinnitus and trouble swallowing.    Eyes: Negative for  discharge and visual disturbance.   Respiratory: Positive for cough and wheezing. Negative for chest tightness and shortness of breath.    Cardiovascular: Negative for chest pain, palpitations and leg swelling.   Gastrointestinal: Negative for abdominal distention, abdominal pain, blood in stool, constipation, diarrhea and vomiting.   Endocrine: Negative for polydipsia and polyuria.   Genitourinary: Negative for difficulty urinating, dysuria, frequency, hematuria, menstrual problem and urgency.   Musculoskeletal: Negative for arthralgias, back pain, joint swelling and neck pain.   Skin: Negative for color change and rash.   Neurological: Positive for headaches. Negative for weakness.   Hematological: Negative for adenopathy.   Psychiatric/Behavioral: Negative for confusion, decreased concentration and dysphoric mood.       Objective:     Physical Exam  Vitals and nursing note reviewed.   Constitutional:       General: She is not in acute distress.  HENT:      Right Ear: External ear normal.      Left Ear: External ear normal.      Nose: Nose normal.   Eyes:      Conjunctiva/sclera: Conjunctivae normal.      Pupils: Pupils are equal, round, and reactive to light.   Neck:      Vascular: No carotid bruit.   Cardiovascular:      Rate and Rhythm: Normal rate and regular rhythm.      Heart sounds: Normal heart sounds.   Pulmonary:      Effort: Pulmonary effort is normal. No respiratory distress.      Breath sounds: Normal breath sounds. No wheezing or rales.   Abdominal:      General: Bowel sounds are normal. There is no distension.      Palpations: Abdomen is soft.      Tenderness: There is no abdominal tenderness. There is no guarding.   Musculoskeletal:      Cervical back: Normal range of motion and neck supple.      Right lower leg: No edema.      Left lower leg: No edema.   Skin:     General: Skin is warm and dry.      Findings: No rash.   Neurological:      Mental Status: She is alert and oriented to person, place,  and time.   Psychiatric:         Behavior: Behavior normal.         Thought Content: Thought content normal.         Judgment: Judgment normal.         Lab Results   Component Value Date    WBC 7.12 08/18/2021    HGB 15.5 08/18/2021    HCT 44.9 08/18/2021     08/18/2021    CHOL 229 (H) 08/18/2021    TRIG 190 (H) 08/18/2021    HDL 39 (L) 08/18/2021    ALT 24 08/18/2021    AST 18 08/18/2021     08/18/2021    K 4.5 08/18/2021     08/18/2021    CREATININE 0.7 08/18/2021    BUN 12 08/18/2021    CO2 26 08/18/2021    TSH 2.723 08/18/2021    HGBA1C 4.9 08/18/2021       Assessment:     1. Cough    2. Viral URI with cough    3. Chronic obstructive pulmonary disease, unspecified COPD type         Plan:     Cough  -     POCT COVID-19 Rapid Screening    Viral URI with cough  -     promethazine-dextromethorphan (PROMETHAZINE-DM) 6.25-15 mg/5 mL Syrp; Take 5 mLs by mouth nightly as needed (Cough).  Dispense: 180 mL; Refill: 0    Chronic obstructive pulmonary disease, unspecified COPD type    Other orders  -     predniSONE (DELTASONE) 20 MG tablet; Take 2 tablets (40 mg total) by mouth once daily. for 5 days  Dispense: 10 tablet; Refill: 0  -     benzonatate (TESSALON) 100 MG capsule; Take 1 capsule (100 mg total) by mouth 3 (three) times daily as needed.  Dispense: 30 capsule; Refill: 0        Vitals reviewed and stable. BP within normal range at 130/80.     Discussed with patient that we will reschedule annual when she is well.   ------  COVID swab results are negative. Patient will be treated for current symptoms.       Questions and concerns addressed.         Documentation entered by Oleksandr Koenig, acting as scribe for Dr. David Carty. 02/11/2022 8:49 AM.

## 2022-11-28 ENCOUNTER — TELEPHONE (OUTPATIENT)
Dept: INTERNAL MEDICINE | Facility: CLINIC | Age: 62
End: 2022-11-28
Payer: MEDICARE

## 2022-11-28 NOTE — TELEPHONE ENCOUNTER
----- Message from Monroe Moncada sent at 11/28/2022  9:54 AM CST -----  Regarding: emergency rfill  Contact: self  Type:  RX Refill Request    Who Called: pt     Refill or New Rx:refill    RX Name and Strength:carBAMazepine (TEGRETOL) 200 mg tablet    How is the patient currently taking it? (ex. 1XDay):2 x day     Is this a 30 day or 90 day RX:90 day supply    [facility] Pharm[facility] with phone number[facility] [address] [place] [id number] [facility] [address]  Phone: [phone] Fax: [phone]      Local or Mail Order:local    Ordering Provider:self     Would the patient rather a call back or a response via [facility]? Call back     Best Call Back Number:[phone]    Additional Information:

## 2022-12-12 ENCOUNTER — OFFICE VISIT (OUTPATIENT)
Dept: INTERNAL MEDICINE | Facility: CLINIC | Age: 62
End: 2022-12-12
Payer: MEDICARE

## 2022-12-12 VITALS
HEIGHT: 60 IN | DIASTOLIC BLOOD PRESSURE: 80 MMHG | SYSTOLIC BLOOD PRESSURE: 180 MMHG | WEIGHT: 136.69 LBS | HEART RATE: 78 BPM | TEMPERATURE: 97 F | BODY MASS INDEX: 26.84 KG/M2

## 2022-12-12 DIAGNOSIS — Z12.2 SCREENING FOR LUNG CANCER: ICD-10-CM

## 2022-12-12 DIAGNOSIS — K59.09 CHRONIC CONSTIPATION: ICD-10-CM

## 2022-12-12 DIAGNOSIS — G89.29 CHRONIC RIGHT SHOULDER PAIN: ICD-10-CM

## 2022-12-12 DIAGNOSIS — Z12.31 SCREENING MAMMOGRAM FOR BREAST CANCER: ICD-10-CM

## 2022-12-12 DIAGNOSIS — E78.2 MIXED HYPERLIPIDEMIA: ICD-10-CM

## 2022-12-12 DIAGNOSIS — I10 ESSENTIAL HYPERTENSION: ICD-10-CM

## 2022-12-12 DIAGNOSIS — F41.1 GAD (GENERALIZED ANXIETY DISORDER): ICD-10-CM

## 2022-12-12 DIAGNOSIS — Z86.73 HISTORY OF STROKE: ICD-10-CM

## 2022-12-12 DIAGNOSIS — J44.9 CHRONIC OBSTRUCTIVE PULMONARY DISEASE, UNSPECIFIED COPD TYPE: ICD-10-CM

## 2022-12-12 DIAGNOSIS — M25.511 CHRONIC RIGHT SHOULDER PAIN: ICD-10-CM

## 2022-12-12 DIAGNOSIS — Z87.891 PERSONAL HISTORY OF NICOTINE DEPENDENCE: ICD-10-CM

## 2022-12-12 DIAGNOSIS — Z00.00 ANNUAL PHYSICAL EXAM: Primary | ICD-10-CM

## 2022-12-12 PROCEDURE — 3008F PR BODY MASS INDEX (BMI) DOCUMENTED: ICD-10-PCS | Mod: CPTII,S$GLB,, | Performed by: NURSE PRACTITIONER

## 2022-12-12 PROCEDURE — 3077F SYST BP >= 140 MM HG: CPT | Mod: CPTII,S$GLB,, | Performed by: NURSE PRACTITIONER

## 2022-12-12 PROCEDURE — 99396 PR PREVENTIVE VISIT,EST,40-64: ICD-10-PCS | Mod: S$GLB,,, | Performed by: NURSE PRACTITIONER

## 2022-12-12 PROCEDURE — 1159F MED LIST DOCD IN RCRD: CPT | Mod: CPTII,S$GLB,, | Performed by: NURSE PRACTITIONER

## 2022-12-12 PROCEDURE — 3079F DIAST BP 80-89 MM HG: CPT | Mod: CPTII,S$GLB,, | Performed by: NURSE PRACTITIONER

## 2022-12-12 PROCEDURE — 3008F BODY MASS INDEX DOCD: CPT | Mod: CPTII,S$GLB,, | Performed by: NURSE PRACTITIONER

## 2022-12-12 PROCEDURE — 1160F PR REVIEW ALL MEDS BY PRESCRIBER/CLIN PHARMACIST DOCUMENTED: ICD-10-PCS | Mod: CPTII,S$GLB,, | Performed by: NURSE PRACTITIONER

## 2022-12-12 PROCEDURE — 3079F PR MOST RECENT DIASTOLIC BLOOD PRESSURE 80-89 MM HG: ICD-10-PCS | Mod: CPTII,S$GLB,, | Performed by: NURSE PRACTITIONER

## 2022-12-12 PROCEDURE — 99999 PR PBB SHADOW E&M-EST. PATIENT-LVL IV: ICD-10-PCS | Mod: PBBFAC,,, | Performed by: NURSE PRACTITIONER

## 2022-12-12 PROCEDURE — 1159F PR MEDICATION LIST DOCUMENTED IN MEDICAL RECORD: ICD-10-PCS | Mod: CPTII,S$GLB,, | Performed by: NURSE PRACTITIONER

## 2022-12-12 PROCEDURE — 3077F PR MOST RECENT SYSTOLIC BLOOD PRESSURE >= 140 MM HG: ICD-10-PCS | Mod: CPTII,S$GLB,, | Performed by: NURSE PRACTITIONER

## 2022-12-12 PROCEDURE — 4010F PR ACE/ARB THEARPY RXD/TAKEN: ICD-10-PCS | Mod: CPTII,S$GLB,, | Performed by: NURSE PRACTITIONER

## 2022-12-12 PROCEDURE — 1160F RVW MEDS BY RX/DR IN RCRD: CPT | Mod: CPTII,S$GLB,, | Performed by: NURSE PRACTITIONER

## 2022-12-12 PROCEDURE — 99999 PR PBB SHADOW E&M-EST. PATIENT-LVL IV: CPT | Mod: PBBFAC,,, | Performed by: NURSE PRACTITIONER

## 2022-12-12 PROCEDURE — 99396 PREV VISIT EST AGE 40-64: CPT | Mod: S$GLB,,, | Performed by: NURSE PRACTITIONER

## 2022-12-12 PROCEDURE — 4010F ACE/ARB THERAPY RXD/TAKEN: CPT | Mod: CPTII,S$GLB,, | Performed by: NURSE PRACTITIONER

## 2022-12-12 RX ORDER — PROMETHAZINE HYDROCHLORIDE AND DEXTROMETHORPHAN HYDROBROMIDE 6.25; 15 MG/5ML; MG/5ML
5 SYRUP ORAL NIGHTLY PRN
Qty: 180 ML | Refills: 0 | Status: SHIPPED | OUTPATIENT
Start: 2022-12-12 | End: 2023-03-30

## 2022-12-12 RX ORDER — CLONAZEPAM 1 MG/1
1 TABLET ORAL 2 TIMES DAILY PRN
Qty: 30 TABLET | Refills: 0 | Status: SHIPPED | OUTPATIENT
Start: 2022-12-12 | End: 2023-02-13

## 2022-12-12 RX ORDER — ALBUTEROL SULFATE 90 UG/1
2 AEROSOL, METERED RESPIRATORY (INHALATION) EVERY 4 HOURS PRN
Qty: 8.5 G | Refills: 2 | Status: SHIPPED | OUTPATIENT
Start: 2022-12-12

## 2022-12-12 RX ORDER — LINACLOTIDE 290 UG/1
290 CAPSULE, GELATIN COATED ORAL DAILY
Qty: 90 CAPSULE | Refills: 3 | Status: SHIPPED | OUTPATIENT
Start: 2022-12-12 | End: 2023-03-12

## 2022-12-12 RX ORDER — LISINOPRIL AND HYDROCHLOROTHIAZIDE 20; 25 MG/1; MG/1
1 TABLET ORAL DAILY
Qty: 90 TABLET | Refills: 3 | Status: SHIPPED | OUTPATIENT
Start: 2022-12-12 | End: 2023-11-28

## 2022-12-12 RX ORDER — VERAPAMIL HYDROCHLORIDE 120 MG/1
120 TABLET, FILM COATED, EXTENDED RELEASE ORAL DAILY
Qty: 90 TABLET | Refills: 3 | Status: SHIPPED | OUTPATIENT
Start: 2022-12-12 | End: 2023-03-30

## 2022-12-12 RX ORDER — CLOPIDOGREL BISULFATE 75 MG/1
75 TABLET ORAL DAILY
Qty: 90 TABLET | Refills: 3 | Status: SHIPPED | OUTPATIENT
Start: 2022-12-12 | End: 2024-01-17 | Stop reason: SDUPTHER

## 2022-12-12 RX ORDER — BUDESONIDE AND FORMOTEROL FUMARATE DIHYDRATE 160; 4.5 UG/1; UG/1
2 AEROSOL RESPIRATORY (INHALATION) EVERY 12 HOURS
Qty: 30.6 G | Refills: 3 | Status: SHIPPED | OUTPATIENT
Start: 2022-12-12 | End: 2023-02-23 | Stop reason: ALTCHOICE

## 2022-12-12 RX ORDER — SERTRALINE HYDROCHLORIDE 100 MG/1
100 TABLET, FILM COATED ORAL DAILY
Qty: 90 TABLET | Refills: 3 | Status: SHIPPED | OUTPATIENT
Start: 2022-12-12 | End: 2024-01-17 | Stop reason: SDUPTHER

## 2022-12-12 RX ORDER — LISINOPRIL AND HYDROCHLOROTHIAZIDE 10; 12.5 MG/1; MG/1
1 TABLET ORAL DAILY
Qty: 90 TABLET | Refills: 3 | Status: SHIPPED | OUTPATIENT
Start: 2022-12-12 | End: 2022-12-12

## 2022-12-12 RX ORDER — EZETIMIBE 10 MG/1
10 TABLET ORAL DAILY
Qty: 90 TABLET | Refills: 3 | Status: SHIPPED | OUTPATIENT
Start: 2022-12-12 | End: 2024-01-17

## 2022-12-12 RX ORDER — ROSUVASTATIN CALCIUM 40 MG/1
40 TABLET, COATED ORAL DAILY
Qty: 90 TABLET | Refills: 3 | Status: SHIPPED | OUTPATIENT
Start: 2022-12-12 | End: 2024-01-17 | Stop reason: SDUPTHER

## 2022-12-12 NOTE — PATIENT INSTRUCTIONS
For blood pressure:  Increase lisinopril hydrochlorothiazide to 20/25mg daily  Continue verapamil

## 2022-12-13 ENCOUNTER — LAB VISIT (OUTPATIENT)
Dept: LAB | Facility: HOSPITAL | Age: 62
End: 2022-12-13
Attending: NURSE PRACTITIONER
Payer: MEDICARE

## 2022-12-13 DIAGNOSIS — J44.9 CHRONIC OBSTRUCTIVE PULMONARY DISEASE, UNSPECIFIED COPD TYPE: ICD-10-CM

## 2022-12-13 DIAGNOSIS — I10 ESSENTIAL HYPERTENSION: ICD-10-CM

## 2022-12-13 DIAGNOSIS — F41.1 GAD (GENERALIZED ANXIETY DISORDER): ICD-10-CM

## 2022-12-13 DIAGNOSIS — E78.2 MIXED HYPERLIPIDEMIA: ICD-10-CM

## 2022-12-13 DIAGNOSIS — K59.09 CHRONIC CONSTIPATION: ICD-10-CM

## 2022-12-13 DIAGNOSIS — Z86.73 HISTORY OF STROKE: ICD-10-CM

## 2022-12-13 LAB
ALBUMIN SERPL BCP-MCNC: 3.9 G/DL (ref 3.5–5.2)
ALP SERPL-CCNC: 102 U/L (ref 55–135)
ALT SERPL W/O P-5'-P-CCNC: 20 U/L (ref 10–44)
ANION GAP SERPL CALC-SCNC: 11 MMOL/L (ref 8–16)
AST SERPL-CCNC: 19 U/L (ref 10–40)
BASOPHILS # BLD AUTO: 0.04 K/UL (ref 0–0.2)
BASOPHILS NFR BLD: 0.6 % (ref 0–1.9)
BILIRUB SERPL-MCNC: 0.5 MG/DL (ref 0.1–1)
BUN SERPL-MCNC: 13 MG/DL (ref 8–23)
CALCIUM SERPL-MCNC: 10.1 MG/DL (ref 8.7–10.5)
CHLORIDE SERPL-SCNC: 102 MMOL/L (ref 95–110)
CHOLEST SERPL-MCNC: 201 MG/DL (ref 120–199)
CHOLEST/HDLC SERPL: 4.5 {RATIO} (ref 2–5)
CO2 SERPL-SCNC: 27 MMOL/L (ref 23–29)
CREAT SERPL-MCNC: 0.8 MG/DL (ref 0.5–1.4)
DIFFERENTIAL METHOD: ABNORMAL
EOSINOPHIL # BLD AUTO: 0.2 K/UL (ref 0–0.5)
EOSINOPHIL NFR BLD: 2.3 % (ref 0–8)
ERYTHROCYTE [DISTWIDTH] IN BLOOD BY AUTOMATED COUNT: 13.5 % (ref 11.5–14.5)
EST. GFR  (NO RACE VARIABLE): >60 ML/MIN/1.73 M^2
GLUCOSE SERPL-MCNC: 99 MG/DL (ref 70–110)
HCT VFR BLD AUTO: 47.2 % (ref 37–48.5)
HDLC SERPL-MCNC: 45 MG/DL (ref 40–75)
HDLC SERPL: 22.4 % (ref 20–50)
HGB BLD-MCNC: 16.1 G/DL (ref 12–16)
IMM GRANULOCYTES # BLD AUTO: 0.02 K/UL (ref 0–0.04)
IMM GRANULOCYTES NFR BLD AUTO: 0.3 % (ref 0–0.5)
LDLC SERPL CALC-MCNC: 127.6 MG/DL (ref 63–159)
LYMPHOCYTES # BLD AUTO: 1.8 K/UL (ref 1–4.8)
LYMPHOCYTES NFR BLD: 26.2 % (ref 18–48)
MCH RBC QN AUTO: 31.3 PG (ref 27–31)
MCHC RBC AUTO-ENTMCNC: 34.1 G/DL (ref 32–36)
MCV RBC AUTO: 92 FL (ref 82–98)
MONOCYTES # BLD AUTO: 0.5 K/UL (ref 0.3–1)
MONOCYTES NFR BLD: 7.3 % (ref 4–15)
NEUTROPHILS # BLD AUTO: 4.3 K/UL (ref 1.8–7.7)
NEUTROPHILS NFR BLD: 63.3 % (ref 38–73)
NONHDLC SERPL-MCNC: 156 MG/DL
NRBC BLD-RTO: 0 /100 WBC
PLATELET # BLD AUTO: 354 K/UL (ref 150–450)
PMV BLD AUTO: 10.7 FL (ref 9.2–12.9)
POTASSIUM SERPL-SCNC: 4.5 MMOL/L (ref 3.5–5.1)
PROT SERPL-MCNC: 7.2 G/DL (ref 6–8.4)
RBC # BLD AUTO: 5.14 M/UL (ref 4–5.4)
SODIUM SERPL-SCNC: 140 MMOL/L (ref 136–145)
TRIGL SERPL-MCNC: 142 MG/DL (ref 30–150)
TSH SERPL DL<=0.005 MIU/L-ACNC: 3.45 UIU/ML (ref 0.4–4)
WBC # BLD AUTO: 6.83 K/UL (ref 3.9–12.7)

## 2022-12-13 PROCEDURE — 80061 LIPID PANEL: CPT | Performed by: NURSE PRACTITIONER

## 2022-12-13 PROCEDURE — 84443 ASSAY THYROID STIM HORMONE: CPT | Performed by: NURSE PRACTITIONER

## 2022-12-13 PROCEDURE — 80053 COMPREHEN METABOLIC PANEL: CPT | Performed by: NURSE PRACTITIONER

## 2022-12-13 PROCEDURE — 85025 COMPLETE CBC W/AUTO DIFF WBC: CPT | Performed by: NURSE PRACTITIONER

## 2022-12-13 PROCEDURE — 36415 COLL VENOUS BLD VENIPUNCTURE: CPT | Mod: PO | Performed by: NURSE PRACTITIONER

## 2023-01-05 ENCOUNTER — HOSPITAL ENCOUNTER (OUTPATIENT)
Dept: RADIOLOGY | Facility: HOSPITAL | Age: 63
Discharge: HOME | End: 2023-01-05
Attending: NURSE PRACTITIONER
Payer: MEDICARE

## 2023-01-05 DIAGNOSIS — Z87.891 PERSONAL HISTORY OF NICOTINE DEPENDENCE: ICD-10-CM

## 2023-01-05 DIAGNOSIS — Z12.2 SCREENING FOR LUNG CANCER: ICD-10-CM

## 2023-01-05 DIAGNOSIS — Z12.31 SCREENING MAMMOGRAM FOR BREAST CANCER: ICD-10-CM

## 2023-01-05 PROCEDURE — 71271 CT THORAX LUNG CANCER SCR C-: CPT | Mod: TC,PN

## 2023-01-05 PROCEDURE — 77067 SCR MAMMO BI INCL CAD: CPT | Mod: 26,,, | Performed by: RADIOLOGY

## 2023-01-05 PROCEDURE — 77063 BREAST TOMOSYNTHESIS BI: CPT | Mod: 26,,, | Performed by: RADIOLOGY

## 2023-01-05 PROCEDURE — 77067 SCR MAMMO BI INCL CAD: CPT | Mod: TC,PN

## 2023-01-05 PROCEDURE — 77067 MAMMO DIGITAL SCREENING BILAT WITH TOMO: ICD-10-PCS | Mod: 26,,, | Performed by: RADIOLOGY

## 2023-01-05 PROCEDURE — 71271 CT CHEST LUNG SCREENING LOW DOSE: ICD-10-PCS | Mod: 26,,, | Performed by: RADIOLOGY

## 2023-01-05 PROCEDURE — 71271 CT THORAX LUNG CANCER SCR C-: CPT | Mod: 26,,, | Performed by: RADIOLOGY

## 2023-01-05 PROCEDURE — 77063 MAMMO DIGITAL SCREENING BILAT WITH TOMO: ICD-10-PCS | Mod: 26,,, | Performed by: RADIOLOGY

## 2023-01-09 ENCOUNTER — TELEPHONE (OUTPATIENT)
Dept: SPORTS MEDICINE | Facility: CLINIC | Age: 63
End: 2023-01-09
Payer: MEDICARE

## 2023-01-09 ENCOUNTER — PATIENT MESSAGE (OUTPATIENT)
Dept: INTERNAL MEDICINE | Facility: CLINIC | Age: 63
End: 2023-01-09
Payer: MEDICARE

## 2023-01-09 DIAGNOSIS — M25.511 RIGHT SHOULDER PAIN, UNSPECIFIED CHRONICITY: Primary | ICD-10-CM

## 2023-01-09 DIAGNOSIS — Z12.2 SCREENING FOR LUNG CANCER: Primary | ICD-10-CM

## 2023-01-09 NOTE — TELEPHONE ENCOUNTER
Spoke with pt about upcoming appt with Dr. Rene on 1/13 in [place]. Informed pt to arrive 30 min prior to appt for xrays. Pt verbalized understanding of appt date, time, and location.

## 2023-01-13 ENCOUNTER — HOSPITAL ENCOUNTER (OUTPATIENT)
Dept: RADIOLOGY | Facility: HOSPITAL | Age: 63
Discharge: HOME | End: 2023-01-13
Attending: STUDENT IN AN ORGANIZED HEALTH CARE EDUCATION/TRAINING PROGRAM
Payer: MEDICARE

## 2023-01-13 ENCOUNTER — OFFICE VISIT (OUTPATIENT)
Dept: ORTHOPEDICS | Facility: CLINIC | Age: 63
End: 2023-01-13
Payer: MEDICARE

## 2023-01-13 DIAGNOSIS — M65.9: ICD-10-CM

## 2023-01-13 DIAGNOSIS — M25.511 RIGHT SHOULDER PAIN, UNSPECIFIED CHRONICITY: ICD-10-CM

## 2023-01-13 DIAGNOSIS — M75.21 BICEPS TENDINITIS OF RIGHT SHOULDER: Primary | ICD-10-CM

## 2023-01-13 PROCEDURE — 20550 NJX 1 TENDON SHEATH/LIGAMENT: CPT | Mod: RT,S$GLB,, | Performed by: STUDENT IN AN ORGANIZED HEALTH CARE EDUCATION/TRAINING PROGRAM

## 2023-01-13 PROCEDURE — 99204 PR OFFICE/OUTPT VISIT, NEW, LEVL IV, 45-59 MIN: ICD-10-PCS | Mod: 25,S$GLB,, | Performed by: STUDENT IN AN ORGANIZED HEALTH CARE EDUCATION/TRAINING PROGRAM

## 2023-01-13 PROCEDURE — 73030 X-RAY EXAM OF SHOULDER: CPT | Mod: 26,RT,, | Performed by: RADIOLOGY

## 2023-01-13 PROCEDURE — 1160F PR REVIEW ALL MEDS BY PRESCRIBER/CLIN PHARMACIST DOCUMENTED: ICD-10-PCS | Mod: CPTII,S$GLB,, | Performed by: STUDENT IN AN ORGANIZED HEALTH CARE EDUCATION/TRAINING PROGRAM

## 2023-01-13 PROCEDURE — 1159F PR MEDICATION LIST DOCUMENTED IN MEDICAL RECORD: ICD-10-PCS | Mod: CPTII,S$GLB,, | Performed by: STUDENT IN AN ORGANIZED HEALTH CARE EDUCATION/TRAINING PROGRAM

## 2023-01-13 PROCEDURE — 99999 PR PBB SHADOW E&M-EST. PATIENT-LVL III: CPT | Mod: PBBFAC,,, | Performed by: STUDENT IN AN ORGANIZED HEALTH CARE EDUCATION/TRAINING PROGRAM

## 2023-01-13 PROCEDURE — 73030 XR SHOULDER COMPLETE 2 OR MORE VIEWS RIGHT: ICD-10-PCS | Mod: 26,RT,, | Performed by: RADIOLOGY

## 2023-01-13 PROCEDURE — 1159F MED LIST DOCD IN RCRD: CPT | Mod: CPTII,S$GLB,, | Performed by: STUDENT IN AN ORGANIZED HEALTH CARE EDUCATION/TRAINING PROGRAM

## 2023-01-13 PROCEDURE — 99999 PR PBB SHADOW E&M-EST. PATIENT-LVL III: ICD-10-PCS | Mod: PBBFAC,,, | Performed by: STUDENT IN AN ORGANIZED HEALTH CARE EDUCATION/TRAINING PROGRAM

## 2023-01-13 PROCEDURE — 73030 X-RAY EXAM OF SHOULDER: CPT | Mod: TC,FY,PO,RT

## 2023-01-13 PROCEDURE — 99204 OFFICE O/P NEW MOD 45 MIN: CPT | Mod: 25,S$GLB,, | Performed by: STUDENT IN AN ORGANIZED HEALTH CARE EDUCATION/TRAINING PROGRAM

## 2023-01-13 PROCEDURE — 20550 TENDON SHEATH: ICD-10-PCS | Mod: RT,S$GLB,, | Performed by: STUDENT IN AN ORGANIZED HEALTH CARE EDUCATION/TRAINING PROGRAM

## 2023-01-13 PROCEDURE — 76942 TENDON SHEATH: ICD-10-PCS | Mod: 26,S$GLB,, | Performed by: STUDENT IN AN ORGANIZED HEALTH CARE EDUCATION/TRAINING PROGRAM

## 2023-01-13 PROCEDURE — 1160F RVW MEDS BY RX/DR IN RCRD: CPT | Mod: CPTII,S$GLB,, | Performed by: STUDENT IN AN ORGANIZED HEALTH CARE EDUCATION/TRAINING PROGRAM

## 2023-01-13 PROCEDURE — 76942 ECHO GUIDE FOR BIOPSY: CPT | Mod: 26,S$GLB,, | Performed by: STUDENT IN AN ORGANIZED HEALTH CARE EDUCATION/TRAINING PROGRAM

## 2023-01-13 RX ORDER — BETAMETHASONE SODIUM PHOSPHATE AND BETAMETHASONE ACETATE 3; 3 MG/ML; MG/ML
3 INJECTION, SUSPENSION INTRA-ARTICULAR; INTRALESIONAL; INTRAMUSCULAR; SOFT TISSUE
Status: DISCONTINUED | OUTPATIENT
Start: 2023-01-13 | End: 2023-01-13 | Stop reason: HOSPADM

## 2023-01-13 RX ADMIN — BETAMETHASONE SODIUM PHOSPHATE AND BETAMETHASONE ACETATE 3 MG: 3; 3 INJECTION, SUSPENSION INTRA-ARTICULAR; INTRALESIONAL; INTRAMUSCULAR; SOFT TISSUE at 08:01

## 2023-01-13 NOTE — PROGRESS NOTES
Patient ID: Kenya Majano  YOB: 1960  MRN: [id number]    Chief Complaint: Pain and Numbness of the Right Shoulder      Referred By: KATERINE Miller    Occupation: Retired      History of Present Illness: Kenya Majano is a right-hand dominant 62 y.o. female who presents today with 5/10 pain c/o right shoulder pain. Patient states the pain began two months ago in 2022. She denies any injury. She describes the pain as a constant ache with intermittent sharp and burning pain. She states the pain radiates down her arm with numbness and tingling. Her pain is located on the anterior right shoulder. She has tried Excedrin and tylenol without relief. She states that tiger balm and wearing a shoulder brace offers some relief. Her pain is made worse when lifting and reaching inwards or behind her.       The patient is active in none.      ***    Past Medical History:   Past Medical History:   Diagnosis Date    Anxiety     Arthritis     Asthma     Cataract     Coronary artery disease     Emphysema of lung     Glaucoma     Hypertension     Seizures     Stroke      Past Surgical History:   Procedure Laterality Date    APPENDECTOMY       SECTION      CHOLECYSTECTOMY      EYE SURGERY      HYSTERECTOMY      around     OOPHORECTOMY      around      Family History   Problem Relation Age of Onset    Diabetes Mother     Stroke Mother     Heart disease Father     Diabetes Maternal Grandmother     No Known Problems Maternal Grandfather     Hearing loss Paternal Grandmother     Hearing loss Paternal Grandfather      Social History     Socioeconomic History    Marital status:     Number of children: 3   Tobacco Use    Smoking status: Every Day     Packs/day: 1.00     Years: 41.00     Pack years: 41.00     Types: Cigarettes    Smokeless tobacco: Never   Substance and Sexual Activity    Alcohol use: Never    Drug use: Never    Sexual activity: Yes     Partners: Male      Medication List with Changes/Refills   Current Medications    ALBUTEROL (PROVENTIL/VENTOLIN HFA) 90 MCG/ACTUATION INHALER    Inhale 2 puffs into the lungs every 4 (four) hours as needed for Wheezing.    BIMATOPROST (LUMIGAN) 0.03 % OPHTHALMIC DROPS    Apply 1 drop to eye.    BRINZOLAMIDE-BRIMONIDINE (SIMBRINZA) 1-0.2 % DRPS    Apply 1 drop to eye.    BUDESONIDE-FORMOTEROL 160-4.5 MCG (SYMBICORT) 160-4.5 MCG/ACTUATION HFAA    Inhale 2 puffs into the lungs every 12 (twelve) hours.    CARBAMAZEPINE (TEGRETOL) 200 MG TABLET    Take 200 mg by mouth 2 (two) times daily.    CLONAZEPAM (KLONOPIN) 1 MG TABLET    Take 1 tablet (1 mg total) by mouth 2 (two) times daily as needed for Anxiety.    CLOPIDOGREL (PLAVIX) 75 MG TABLET    Take 1 tablet (75 mg total) by mouth once daily.    EZETIMIBE (ZETIA) 10 MG TABLET    Take 1 tablet (10 mg total) by mouth once daily.    LINZESS 290 MCG CAP CAPSULE    Take 1 capsule (290 mcg total) by mouth once daily.    LISINOPRIL-HYDROCHLOROTHIAZIDE (PRINZIDE,ZESTORETIC) 20-25 MG TAB    Take 1 tablet by mouth once daily.    PROMETHAZINE-DEXTROMETHORPHAN (PROMETHAZINE-DM) 6.25-15 MG/5 ML SYRP    Take 5 mLs by mouth nightly as needed (Cough).    ROSUVASTATIN (CRESTOR) 40 MG TAB    Take 1 tablet (40 mg total) by mouth once daily.    SERTRALINE (ZOLOFT) 100 MG TABLET    Take 1 tablet (100 mg total) by mouth once daily.    VERAPAMIL (CALAN-SR) 120 MG CR TABLET    Take 1 tablet (120 mg total) by mouth once daily.     Review of patient's allergies indicates:   Allergen Reactions    Sulfa (sulfonamide antibiotics) Swelling    Dimenhydrinate Other (See Comments)    Sumatriptan     Latex Rash       Physical Exam:   There is no height or weight on file to calculate BMI.    Physical Exam      Detailed MSK exam:   Ortho/SPM Exam       Imaging:  CT Chest Lung Screening Low Dose  Narrative: EXAMINATION:  CT CHEST LUNG SCREENING LOW DOSE    CLINICAL HISTORY:  Lung cancer screening, >= 30 pk-yr current  smoker (Age 55-80y); Encounter for screening for malignant neoplasm of respiratory organs    TECHNIQUE:  CT of the thorax was performed with low dose, lung screening protocol.  No contrast was administered.  Sagittal and coronal reconstructions were obtained.    COMPARISON:  None.    FINDINGS:  The size of heart is within normal limits.  There is no evidence of clinically significant coronary atherosclerosis.  There are calcified mediastinal and right perihilar lymph nodes.  There are several calcified granulomas in the right lung.  One of the larger ones measures 6 mm and is located in the base of the right lower lobe.  There are several noncalcified pulmonary nodules scattered throughout the lungs.  One of the larger ones measures 4 mm and is located in the lateral aspect of the left lower lobe.  There is a subtle amount of haziness in the anterolateral aspect of the right lower lobe.  There is a subtle amount of haziness in the inferior aspect of the lingula.  There are mild emphysematous changes in both lungs.  There is no pneumothorax or pleural effusion.  There is a mild amount of dextroconvex curvature of the thoracic spine.  The gallbladder is absent.  There are surgical clips in the gallbladder fossa.  There are small calcifications scattered throughout the spleen.  Impression: Lung-RADS Category:  3 - Probably Benign- 6 month LDCT.    Clinically or potentially clinically significant non lung cancer finding:  S - Significant.    Prior Lung Cancer Modifier:  No history of prior lung cancer.    1. There is a subtle amount of haziness in the anterolateral aspect of the right lower lobe. There is a subtle amount of haziness in the inferior aspect of the lingula.  An infectious process cannot be excluded.  2. There are several noncalcified pulmonary nodules scattered throughout the lungs.  One of the larger ones measures 4 mm and is located in the lateral aspect of the left lower lobe.  3. There are calcified  mediastinal and right perihilar lymph nodes.  There are several calcified granulomas in the right lung. One of the larger ones measures 6 mm and is located in the base of the right lower lobe. There are small calcifications scattered throughout the spleen.  This is characteristic of a prior granulomatous infection.  4. There are mild emphysematous changes in both lungs.  5. There is a mild amount of dextroconvex curvature of the thoracic spine.  All CT scans at this facility use dose modulation, iterative reconstruction, and/or weight base dosing when appropriate to reduce radiation dose when appropriate to reduce radiation dose to as low as reasonably achievable.    Electronically signed by: Jose De Jesus Li MD  Date:    01/05/2023  Time:    10:05    ***    Relevant imaging results were reviewed and interpreted by me and per my read: ***    This was discussed with the patient and / or family today.       There are no Patient Instructions on file for this visit.        A copy of today's visit note has been sent to the referring provider.       Lit Rene MD  Primary Care Sports Medicine        Disclaimer: This note was prepared using a voice recognition system and is likely to have sound alike errors within the text.     I spent a total of *** minutes on the day of the visit.This includes face to face time and non-face to face time preparing to see the patient (eg, review of tests), obtaining and/or reviewing separately obtained history, documenting clinical information in the electronic or other health record, independently interpreting results and communicating results to the patient/family/caregiver, or care coordinator.

## 2023-01-13 NOTE — PATIENT INSTRUCTIONS
Assessment:  Kenya Majano is a 62 y.o. female with a chief complaint of Pain and Numbness of the Right Shoulder      Encounter Diagnoses   Name Primary?    Biceps tendinitis of right shoulder Yes    Tenosynovitis of shoulder         Plan:  XR reviewed - w mild OA  Labs reviewed - A1c 4.9%, Cr 0.8, GFR WNL, LFTs WNL  History & clinical exam suggestive of proximal biceps tendinitis/tendinopathy, without any evidence of complete or high-grade tear. She also may have some underlying rotator cuff tendinopathy/impingement, also without any evidence of complete or high-grade tear  Recommend for US guided prox biceps tendin sheath CSI today  Reevaluate in 2 weeks  Consider SA CSI vs MRI vs PT    Follow-up: 2 Weeks or sooner if there are any problems between now and then.    Thank you for choosing [facility] and Dr. Lit Rene for your orthopedic & sports medicine care. It is our goal to provide you with exceptional care that will help keep you healthy, active, and get you back in the game.    Please do not hesitate to reach out to us via email, phone, or MyChart with any questions, concerns, or feedback.    If you are experiencing pain/discomfort ,or have questions after 5pm and would like to be connected to the [facility] on-call team, please call this number and specify which Sports Medicine provider is treating you: [phone]

## 2023-01-13 NOTE — PROCEDURES
rightTendon Sheath    Date/Time: 1/13/2023 8:30 AM  Performed by: Lit Rene MD  Authorized by: Lit Rene MD     Consent Done?:  Yes (Verbal)  Indications:  Pain  Site marked: the procedure site was marked    Timeout: prior to procedure the correct patient, procedure, and site was verified    Prep: patient was prepped and draped in usual sterile fashion      Local anesthesia used?: Yes    Anesthesia:  Local infiltration  Local anesthetic:  Bupivacaine 0.5% without epinephrine, lidocaine 1% without epinephrine and topical anesthetic  Anesthetic total (ml):  2    Location:  Shoulder  Site:  R bicep tendon  Ultrasonic guidance for needle placement?: Yes    Needle size:  22 G  Approach:  Lateral  Medications:  3 mg betamethasone acetate-betamethasone sodium phosphate 6 mg/mL  Patient tolerance:  Patient tolerated the procedure well with no immediate complications    Additional Comments: Ultrasound guidance was used for needle localization. Images were saved and stored for documentation. The appropriate structures were visualized. Dynamic visualization of the needle was continuous throughout the procedures and maintained good position.     We discussed the proper protocols after the injection such as no submerging pools, baths tubs, or hot tubs for 48 hr.  Showering is okay today.  We also discussed that blood sugars can be elevated after an injection and asked patient to properly check their sugars over the next few days and contact their PCP if there are any concerns.  We discussed red flags such as fevers, chills, red, warm, tender joint at the area of injection to please seek medical care immediately.        US-guided injection progression images below:   Proximal biceps tendon visualised in the sagittal axis with the needle out of plane              An addendum to the note was added today, 01/18/2023 at 1:17 PM to provide the accurate images from this ultrasound-guided procedure.    Lit Rene  MD  Primary Care Sports Medicine

## 2023-01-13 NOTE — PROGRESS NOTES
Patient ID: Kenya Majano  YOB: 1960  MRN: [id number]    Chief Complaint: Pain and Numbness of the Right Shoulder      Referred By: KATERINE Miller    Occupation: Retired      History of Present Illness: Kenya Majano is a right-hand dominant 62 y.o. female who presents today with 5/10 pain c/o right shoulder pain. Patient states the pain began two months ago in 2022. She denies any injury. She describes the pain as a constant ache with intermittent sharp and burning pain. She states the pain radiates down her arm with numbness and tingling. Her pain is located on the anterior right shoulder. She has tried Excedrin and tylenol without relief. She states that tiger balm and wearing a shoulder brace offers some relief. Her pain is made worse when lifting and reaching inwards or behind her. Pain increases when she does activities such as crotcheting or getting items in and out of refrigerator, or vacuuming, fixing hair and unable to put bra on and unable to put shirt on.       The patient is active in none.          Past Medical History:   Past Medical History:   Diagnosis Date    Anxiety     Arthritis     Asthma     Cataract     Coronary artery disease     Emphysema of lung     Glaucoma     Hypertension     Seizures     Stroke      Past Surgical History:   Procedure Laterality Date    APPENDECTOMY       SECTION      CHOLECYSTECTOMY      EYE SURGERY      HYSTERECTOMY      around     OOPHORECTOMY      around      Family History   Problem Relation Age of Onset    Diabetes Mother     Stroke Mother     Heart disease Father     Diabetes Maternal Grandmother     No Known Problems Maternal Grandfather     Hearing loss Paternal Grandmother     Hearing loss Paternal Grandfather      Social History     Socioeconomic History    Marital status:     Number of children: 3   Tobacco Use    Smoking status: Every Day     Packs/day: 1.00     Years: 41.00     Pack  years: 41.00     Types: Cigarettes    Smokeless tobacco: Never   Substance and Sexual Activity    Alcohol use: Never    Drug use: Never    Sexual activity: Yes     Partners: Male     Medication List with Changes/Refills   Current Medications    ALBUTEROL (PROVENTIL/VENTOLIN HFA) 90 MCG/ACTUATION INHALER    Inhale 2 puffs into the lungs every 4 (four) hours as needed for Wheezing.    BIMATOPROST (LUMIGAN) 0.03 % OPHTHALMIC DROPS    Apply 1 drop to eye.    BRINZOLAMIDE-BRIMONIDINE (SIMBRINZA) 1-0.2 % DRPS    Apply 1 drop to eye.    BUDESONIDE-FORMOTEROL 160-4.5 MCG (SYMBICORT) 160-4.5 MCG/ACTUATION HFAA    Inhale 2 puffs into the lungs every 12 (twelve) hours.    CARBAMAZEPINE (TEGRETOL) 200 MG TABLET    Take 200 mg by mouth 2 (two) times daily.    CLONAZEPAM (KLONOPIN) 1 MG TABLET    Take 1 tablet (1 mg total) by mouth 2 (two) times daily as needed for Anxiety.    CLOPIDOGREL (PLAVIX) 75 MG TABLET    Take 1 tablet (75 mg total) by mouth once daily.    EZETIMIBE (ZETIA) 10 MG TABLET    Take 1 tablet (10 mg total) by mouth once daily.    LINZESS 290 MCG CAP CAPSULE    Take 1 capsule (290 mcg total) by mouth once daily.    LISINOPRIL-HYDROCHLOROTHIAZIDE (PRINZIDE,ZESTORETIC) 20-25 MG TAB    Take 1 tablet by mouth once daily.    PROMETHAZINE-DEXTROMETHORPHAN (PROMETHAZINE-DM) 6.25-15 MG/5 ML SYRP    Take 5 mLs by mouth nightly as needed (Cough).    ROSUVASTATIN (CRESTOR) 40 MG TAB    Take 1 tablet (40 mg total) by mouth once daily.    SERTRALINE (ZOLOFT) 100 MG TABLET    Take 1 tablet (100 mg total) by mouth once daily.    VERAPAMIL (CALAN-SR) 120 MG CR TABLET    Take 1 tablet (120 mg total) by mouth once daily.     Review of patient's allergies indicates:   Allergen Reactions    Sulfa (sulfonamide antibiotics) Swelling    Dimenhydrinate Other (See Comments)    Sumatriptan     Latex Rash       Physical Exam:   There is no height or weight on file to calculate BMI.    Physical Exam      Detailed MSK exam:     Right  Shoulder:    Inspection: Normal    Palpation tenderness: Bicipital Groove    Range of motion: 110 deg Flexion         100 deg External Rotation in Adduction         IR to Sacrum    Strength:  4/5 Abduction    3/5 External Rotation in Adduction    3/5 Internal Rotation     N/V Exam:  Radial: Normal motor (EPL/thumbs up)              Normal sensory (dorsal hand)   Median: Normal motor (FPL/A-OK)      Normal sensory (thumb)   Ulnar:  Normal motor (Interossei/scissors-spread)     Normal sensory (5th finger)   LABC: Normal sensory (lateral forearm)   MABC: Normal sensory (medial forearm)   MC: Normal motor (elbow flexion)   Axillary: Normal motor/sensory (deltoid)  Normal radial and ulnar pulses, warm and well perfused with capillary refill < 2 sec     Normal tinel's sign at wrist and elbow    Left Shoulder:    Inspection: Normal    Palpation tenderness: None    Range of motion: Full deg Abduction         Full deg External Rotation in Adduction         IR to Upper Thoracic    Strength:  5/5 Flexion    5/5 External Rotation in Adduction    5/5 Internal Rotation     N/V Exam:  Radial: Normal motor (EPL/thumbs up)              Normal sensory (dorsal hand)   Median: Normal motor (FPL/A-OK)      Normal sensory (thumb)   Ulnar:  Normal motor (Interossei/scissors-spread)     Normal sensory (5th finger)   LABC: Normal sensory (lateral forearm)   MABC: Normal sensory (medial forearm)   MC: Normal motor (elbow flexion)   Axillary: Normal motor/sensory (deltoid)  Normal radial and ulnar pulses, warm and well perfused with capillary refill < 2 sec                   Imaging:  X-ray Shoulder 2 or More Views Right  Narrative: EXAMINATION:  XR SHOULDER COMPLETE 2 OR MORE VIEWS RIGHT    CLINICAL HISTORY:  Pain in right shoulder    TECHNIQUE:  Two or three views of the right shoulder were preformed.    COMPARISON:  None    FINDINGS:  There is no radiographic evidence of acute osseous, articular, or soft tissue abnormality.  There is mild  AC joint arthrosis.Glenohumeral joint space is preserved.  Impression: Mild degenerative findings as above    Electronically signed by: Erci Licona MD  Date:    01/13/2023  Time:    08:38      Relevant imaging results were reviewed and interpreted by me and per my read:  Operative changes of the glenohumeral and acromioclavicular joints.  Normal alignment.  No calcific tendinopathy.  No fractures or other acute abnormalities.    This was discussed with the patient and / or family today.       Patient Instructions   Assessment:  Kenya Majano is a 62 y.o. female with a chief complaint of Pain and Numbness of the Right Shoulder      Encounter Diagnoses   Name Primary?    Biceps tendinitis of right shoulder Yes    Tenosynovitis of shoulder         Plan:  XR reviewed - w mild OA  Labs reviewed - A1c 4.9%, Cr 0.8, GFR WNL, LFTs WNL  History & clinical exam suggestive of proximal biceps tendinitis/tendinopathy, without any evidence of complete or high-grade tear. She also may have some underlying rotator cuff tendinopathy/impingement, also without any evidence of complete or high-grade tear  Recommend for US guided prox biceps tendin sheath CSI today  Reevaluate in 2 weeks  Consider SA CSI vs MRI vs PT    Follow-up: 2 Weeks or sooner if there are any problems between now and then.    Thank you for choosing [facility] and Dr. Lit Rene for your orthopedic & sports medicine care. It is our goal to provide you with exceptional care that will help keep you healthy, active, and get you back in the game.    Please do not hesitate to reach out to us via email, phone, or MyChart with any questions, concerns, or feedback.    If you are experiencing pain/discomfort ,or have questions after 5pm and would like to be connected to the [facility] on-call team, please call this number and specify which Sports Medicine provider is treating you: [phone]            A copy of today's visit note has been sent to the referring provider.       Lit Rene MD  Primary Care Sports Medicine        Disclaimer: This note was prepared using a voice recognition system and is likely to have sound alike errors within the text.

## 2023-01-18 ENCOUNTER — DOCUMENTATION ONLY (OUTPATIENT)
Dept: ORTHOPEDICS | Facility: CLINIC | Age: 63
End: 2023-01-18
Payer: MEDICARE

## 2023-02-04 NOTE — PROGRESS NOTES
Progress Note      Subjective   Subjective:  Jojo Reyna is a 89 year old female with PMH of hypercalcemia of malignancy, hormone positive breast cancer 1997 s/p mastectomy, T2DM, HTN, HLD, anxiety, osteoarthritis admitted to the ED due to abnormal labs (calcium of 13.5) by her endocrinologist Dr. Wilkins.    Interval history:  -Had an episode of NBNB emesis overnight, given Zofran  -Calcium down trending    Review of Systems   Review of systems: Per HPI otherwise negative    Past Medical History:   Diagnosis Date   • Bone cancer (CMS/HCC)    • Malignant neoplasm of female breast (CMS/HCC) 05/01/2012    Overview:  IMO Load - [id number]      Past Surgical History:   Procedure Laterality Date   • Cataract extrac w/ intraocular lens imp&ant vit,bilaterl Bilateral 2008    Dr Doherty   • Mastectomy Right 1997    Dr Wesley   • Total knee arthroplasty Right 2011    Dr Jw Freitas - done at [facility]      Family History   Problem Relation Age of Onset   • Cancer, Esophageal Mother 62   • Motor Vehicle Accident Father    • Heart disease Brother    • Cancer, Esophageal Other    • Patient is unaware of any medical problems Son         1 dtr - 26   • Osteoarthritis Son         3 dtrs   • Patient is unaware of any medical problems Daughter         3 - 1 girl 2 boys      ALLERGIES:   Allergen Reactions   • Hydrochlorothiazide Other (See Comments)     Hypercalcemia   • Lisinopril Cough   • Penicillins RASH        Objective     Vital Last Value 24 Hour Range   Temperature 98.1 °F (36.7 °C) (02/04/23 0611) Temp  Min: 96.6 °F (35.9 °C)  Max: 98.1 °F (36.7 °C)   Pulse 73 (02/04/23 0611) Pulse  Min: 69  Max: 86   Respiratory 18 (02/04/23 0611) Resp  Min: 18  Max: 22   Non-Invasive  Blood Pressure 138/61 (02/04/23 0611) BP  Min: 127/60  Max: 147/68   Pulse Oximetry 93 % (02/04/23 0611) SpO2  Min: 93 %  Max: 96 %   Arterial   Blood Pressure   No data recorded      I/O's  Date 02/03/23 0700 - 02/04/23 0659 02/04/23 0700 - 02/05/23 0659   Shift  Subjective:       Patient ID: Kenya Majano is a 62 y.o. female.    Chief Complaint: Annual Exam, Cough, and Medication Refill    62 year old female here for annual  Doing well  Bowels move fine  Mood stable  Smoking 1 pack/day  On plavix; has had 2 strokes  Sleep is good at times and sometimes has issues staying asleep  Kept baby with rsv and son had the flu; having a cough for several weeks; no fever  Cough is wet sounding; a little productive; taking mucinex   Does have wheezing; on symbicort and using it for rescue also; not using albuterol currently  Was off symbicort for a while recently   Has seizure history; neuro is in [place]/ on carbamazepine    Having right shoulder pain for several months; wants to see a specialist      Cough  Pertinent negatives include no chest pain, chills, fever, rash or shortness of breath.   Medication Refill  Associated symptoms include arthralgias and coughing. Pertinent negatives include no chest pain, chills, diaphoresis, fatigue, fever or rash.     BP (!) 180/80   Pulse 78   Temp 96.6 °F (35.9 °C) (Temporal)   Ht 5' (1.524 m)   Wt 62 kg (136 lb 11 oz)   LMP  (LMP Unknown)   BMI 26.69 kg/m²     Review of Systems   Constitutional:  Negative for activity change, appetite change, chills, diaphoresis, fatigue, fever and unexpected weight change.   HENT: Negative.     Respiratory:  Positive for cough. Negative for shortness of breath.    Cardiovascular:  Negative for chest pain, palpitations and leg swelling.   Gastrointestinal: Negative.    Genitourinary: Negative.    Musculoskeletal:  Positive for arthralgias.   Skin:  Negative for color change, pallor, rash and wound.   Allergic/Immunologic: Negative for immunocompromised state.   Neurological: Negative.  Negative for dizziness and facial asymmetry.   Hematological:  Negative for adenopathy. Does not bruise/bleed easily.   Psychiatric/Behavioral:  Negative for agitation, behavioral problems and confusion.       Objective:      Physical Exam  Vitals and nursing note reviewed.   Constitutional:       General: She is not in acute distress.     Appearance: Normal appearance. She is well-developed. She is not diaphoretic.   HENT:      Head: Normocephalic and atraumatic.      Right Ear: Hearing, tympanic membrane, ear canal and external ear normal.      Left Ear: Hearing, tympanic membrane, ear canal and external ear normal.      Nose: Nose normal. No mucosal edema or rhinorrhea.      Right Sinus: No maxillary sinus tenderness or frontal sinus tenderness.      Left Sinus: No maxillary sinus tenderness or frontal sinus tenderness.      Mouth/Throat:      Pharynx: Uvula midline. No oropharyngeal exudate or posterior oropharyngeal erythema.   Eyes:      General:         Right eye: No discharge.         Left eye: No discharge.      Conjunctiva/sclera: Conjunctivae normal.      Pupils: Pupils are equal, round, and reactive to light.   Neck:      Thyroid: No thyroid mass or thyromegaly.      Vascular: No JVD.      Trachea: Trachea normal. No tracheal deviation.   Cardiovascular:      Rate and Rhythm: Normal rate and regular rhythm.      Heart sounds: Normal heart sounds. No murmur heard.  Pulmonary:      Effort: Pulmonary effort is normal. No respiratory distress.      Breath sounds: Normal breath sounds. No stridor. No decreased breath sounds, wheezing, rhonchi or rales.   Chest:      Chest wall: No tenderness.   Abdominal:      General: Bowel sounds are normal. There is no distension.      Palpations: Abdomen is soft. Abdomen is not rigid. There is no fluid wave.      Tenderness: There is no abdominal tenderness. There is no guarding or rebound.   Musculoskeletal:         General: Normal range of motion.      Cervical back: Normal range of motion.   Lymphadenopathy:      Head:      Right side of head: No tonsillar adenopathy.      Left side of head: No tonsillar adenopathy.      Cervical: No cervical adenopathy.   Skin:      0500-4758 [id number] 7028-0700 24 Hour Total 6185-3936 7556-9504 6022-4088 24 Hour Total   INTAKE   P.O.   350 350       Shift Total(mL/kg)   350(5.5) 350(5.5)       OUTPUT   Urine   850 850 350   350   Shift Total(mL/kg)   850(13.3) 850(13.3) 350(5.5)   350(5.5)   Weight (kg)  64.1 64.1 64.1 64.1 64.1 64.1 64.1       Intake/Output Summary (Last 24 hours) at 2/4/2023 0841  Last data filed at 2/4/2023 0800  Gross per 24 hour   Intake 350 ml   Output 1200 ml   Net -850 ml       Physical Exam  General: AOx4, NAD, appears stated age, resting in bed   HEENT: normocephalic, atraumatic, EOMI, PERRL, MMM   Neck: supple, nontender, no LAD   CV: RRR, +S1 +S2, no murmurs/rubs/gallops. Carotid, radial, and dorsalis pedis pulses +2/4 b/l. No JVD   Lungs: CTAB, no crackles/rhonchi/wheezing   Abdomen: soft, nontender, nondistended, BS present, tympanic to percussion. No rigidity, rebound or guarding.   Extremities: no peripheral edema, clubbing, or cyanosis   Neuro: CN 2-12 grossly intact   Skin: warm, dry, intact    Labs   CBC:   Recent Labs   Lab 02/04/23 0518 02/03/23 2158 01/28/23  0415 01/27/23  0407   WBC 7.5 8.4 5.8 6.9   RBC 4.04 4.26 3.80* 4.12   HGB 11.6* 12.3 11.0* 11.7*   HCT 35.9* 37.4 34.5* 37.2   MCV 88.9 87.8 90.8 90.3   MCHC 32.3 32.9 31.9* 31.5*   RDW-CV 13.8 13.9 13.8 13.9    284 233 269   Lymphocytes, Percent 28 27 34 40     CMP:  Recent Labs   Lab 02/04/23 0518 02/03/23 2158 02/03/23  1457   SODIUM 133* 135 133*   POTASSIUM 4.3 4.3 4.6   CHLORIDE 106 100 101   CO2 22 26 25   BUN 17 20 20   CREATININE 0.54 0.86 0.85   GLUCOSE 138* 123* 116*   ALBUMIN 3.3* 3.7 4.1   AST 20 22 22   GPT 21 22 21   ALKPT 128* 134* 151*   BILIRUBIN 0.4 0.3 0.4     LIVER FUNCTION:   Recent Labs   Lab 02/04/23  0518 02/03/23  2158 02/03/23  1457   AST 20 22 22   GPT 21 22 21   ALKPT 128* 134* 151*   BILIRUBIN 0.4 0.3 0.4       Microbiology:  Microbiology Results     None          INFLAMMATORY LABS: No results  General: Skin is warm and dry.      Findings: No rash.   Neurological:      Mental Status: She is alert and oriented to person, place, and time.   Psychiatric:         Speech: Speech normal.         Behavior: Behavior normal. Behavior is cooperative.         Thought Content: Thought content normal.         Judgment: Judgment normal.       Assessment:       1. Annual physical exam    2. OTONIEL (generalized anxiety disorder)    3. Chronic obstructive pulmonary disease, unspecified COPD type    4. History of stroke    5. Chronic constipation    6. Mixed hyperlipidemia    7. Essential hypertension    8. Screening mammogram for breast cancer    9. Screening for lung cancer    10. Personal history of nicotine dependence    11. Chronic right shoulder pain          Plan:       Kenya was seen today for annual exam, cough and medication refill.    Diagnoses and all orders for this visit:    Annual physical exam    OTONIEL (generalized anxiety disorder)  -     sertraline (ZOLOFT) 100 MG tablet; Take 1 tablet (100 mg total) by mouth once daily.  -     clonazePAM (KLONOPIN) 1 MG tablet; Take 1 tablet (1 mg total) by mouth 2 (two) times daily as needed for Anxiety.  -     CBC Auto Differential; Future  -     Comprehensive Metabolic Panel; Future  -     TSH; Future  -     Lipid Panel; Future  -     CARBAMAZEPINE LEVEL, TOTAL; Future    Chronic obstructive pulmonary disease, unspecified COPD type  -     albuterol (PROVENTIL/VENTOLIN HFA) 90 mcg/actuation inhaler; Inhale 2 puffs into the lungs every 4 (four) hours as needed for Wheezing.  -     budesonide-formoterol 160-4.5 mcg (SYMBICORT) 160-4.5 mcg/actuation HFAA; Inhale 2 puffs into the lungs every 12 (twelve) hours.  -     CBC Auto Differential; Future  -     Comprehensive Metabolic Panel; Future  -     TSH; Future  -     Lipid Panel; Future  -     CARBAMAZEPINE LEVEL, TOTAL; Future  -     promethazine-dextromethorphan (PROMETHAZINE-DM) 6.25-15 mg/5 mL Syrp; Take 5 mLs by mouth  found  COAGULATION STUDIES: No results found  TSH:    Lab Results   Component Value Date    TSH 0.484 12/30/2022    TSH 1.521 03/04/2020     HbA1c:   Hemoglobin A1C (%)   Date Value   10/11/2022 7.2 (H)     LIPID PANEL: No results found  NT-PRONBP: No results for input(s): NTPROB in the last 8765 hours.  Troponin: No results found  ABG: No results found    Meds  Current Facility-Administered Medications   Medication   • amLODIPine (NORVASC) tablet 10 mg   • aspirin (ECOTRIN) enteric coated tablet 81 mg   • losartan (COZAAR) tablet 50 mg   • dextrose 50 % injection 25 g   • dextrose 50 % injection 12.5 g   • glucagon (GLUCAGEN) injection 1 mg   • dextrose (GLUTOSE) 40 % gel 15 g   • dextrose (GLUTOSE) 40 % gel 30 g   • insulin lispro (ADMELOG,HumaLOG) - Correction Dose   • insulin lispro (ADMELOG,HumaLOG) - Correction Dose   • ondansetron (ZOFRAN) injection 4 mg   • sodium chloride 0.9% infusion   • sodium chloride 0.9 % flush bag 25 mL   • sodium chloride (PF) 0.9 % injection 2 mL   • enoxaparin (LOVENOX) injection 40 mg   • sodium chloride (NORMAL SALINE) 0.9 % bolus 500 mL     Home Medications:  Medications Prior to Admission   Medication Sig Dispense Refill   • docusate sodium-sennosides (SENOKOT S) 50-8.6 MG per tablet Take 1 tablet by mouth daily.     • polyethylene glycol (MIRALAX) 17 g packet Take 17 g by mouth daily as needed (Constipation). Stir and dissolve powder in any 4 to 8 ounces of beverage, then drink.     • aspirin (ECOTRIN) 81 MG EC tablet Take 1 tablet by mouth daily. 30 tablet 0   • Glycerin-Hypromellose- (VISINE TEARS OP) Apply 1 drop to eye as needed.     • ALPRAZolam (XANAX) 0.25 MG tablet Take 1 tablet by mouth daily as needed for Anxiety. (Patient taking differently: Take 0.25 mg by mouth as needed for Anxiety.) 30 tablet 0   • zinc methionate 50 MG capsule Take 50 mg by mouth daily.     • Ascorbic Acid (vitamin C) 1000 MG tablet Take 1,000 mg by mouth daily.     • amLODIPine  (NORVASC) 10 MG tablet Take 1 tablet by mouth daily. Do not start before January 7, 2023. 30 tablet 0   • losartan (COZAAR) 50 MG tablet TAKE 1 TABLET DAILY (Patient taking differently: Take 50 mg by mouth every morning.) 90 tablet 1   • metFORMIN (GLUCOPHAGE) 500 MG tablet TAKE 1 TABLET EVERY 12     HOURS (Patient taking differently: Take 500 mg by mouth in the morning and 500 mg in the evening.) 180 tablet 3   • Cholecalciferol 50 mcg (2,000 units) capsule Take 50 mcg by mouth every morning.     • Lancets (OneTouch Delica Plus Rigrgv49H) Misc 1 each daily. Check blood sugar once daily. Dx: Type 2 DM (E11.9) non-insulin dependent 104 each 3   • OneTouch Ultra test strip USE TO TEST ONCE DAILY 100 strip 3     Inpatient Medications:  Current Facility-Administered Medications   Medication Dose Route Frequency Provider Last Rate Last Admin   • amLODIPine (NORVASC) tablet 10 mg  10 mg Oral Daily Marty Herron MD       • aspirin (ECOTRIN) enteric coated tablet 81 mg  81 mg Oral Daily Marty Herron MD       • losartan (COZAAR) tablet 50 mg  50 mg Oral Daily Marty Herron MD       • dextrose 50 % injection 25 g  25 g Intravenous PRN Marty Herron MD       • dextrose 50 % injection 12.5 g  12.5 g Intravenous PRN Marty Herron MD       • glucagon (GLUCAGEN) injection 1 mg  1 mg Intramuscular PRN Marty Herron MD       • dextrose (GLUTOSE) 40 % gel 15 g  15 g Oral PRN Marty Herron MD       • dextrose (GLUTOSE) 40 % gel 30 g  30 g Oral PRN Marty Herron MD       • insulin lispro (ADMELOG,HumaLOG) - Correction Dose   Subcutaneous TID WC Marty Herron MD       • insulin lispro (ADMELOG,HumaLOG) - Correction Dose   Subcutaneous Nightly Marty Herron MD       • ondansetron (ZOFRAN) injection 4 mg  4 mg Intravenous Q8H PRN Robel Boggs       • sodium chloride 0.9% infusion   Intravenous Continuous Virginia Kelly DO       • sodium chloride 0.9 % flush bag 25 mL  25 mL Intravenous PRN Marty Herron MD       • sodium  nightly as needed (Cough).    History of stroke  -     clopidogreL (PLAVIX) 75 mg tablet; Take 1 tablet (75 mg total) by mouth once daily.  -     CBC Auto Differential; Future  -     Comprehensive Metabolic Panel; Future  -     TSH; Future  -     Lipid Panel; Future  -     CARBAMAZEPINE LEVEL, TOTAL; Future    Chronic constipation  -     LINZESS 290 mcg Cap capsule; Take 1 capsule (290 mcg total) by mouth once daily.  -     Comprehensive Metabolic Panel; Future  -     TSH; Future  -     Lipid Panel; Future  -     CARBAMAZEPINE LEVEL, TOTAL; Future    Mixed hyperlipidemia  -     ezetimibe (ZETIA) 10 mg tablet; Take 1 tablet (10 mg total) by mouth once daily.  -     rosuvastatin (CRESTOR) 40 MG Tab; Take 1 tablet (40 mg total) by mouth once daily.  -     CBC Auto Differential; Future  -     Comprehensive Metabolic Panel; Future  -     TSH; Future  -     Lipid Panel; Future  -     CARBAMAZEPINE LEVEL, TOTAL; Future    Essential hypertension  -     Discontinue: lisinopriL-hydrochlorothiazide (PRINZIDE,ZESTORETIC) 10-12.5 mg per tablet; Take 1 tablet by mouth once daily.  -     verapamiL (CALAN-SR) 120 MG CR tablet; Take 1 tablet (120 mg total) by mouth once daily.  -     CBC Auto Differential; Future  -     Comprehensive Metabolic Panel; Future  -     TSH; Future  -     Lipid Panel; Future  -     CARBAMAZEPINE LEVEL, TOTAL; Future  -     lisinopriL-hydrochlorothiazide (PRINZIDE,ZESTORETIC) 20-25 mg Tab; Take 1 tablet by mouth once daily.    Screening mammogram for breast cancer  -     Mammo Digital Screening Bilat w/ Shahid; Future    Screening for lung cancer  -     CT Chest Lung Screening Low Dose; Future    Personal history of nicotine dependence  -     CT Chest Lung Screening Low Dose; Future    Chronic right shoulder pain  -     Ambulatory referral/consult to Orthopedics; Future    Discussed age appropriate anticipatory guidance, healthy diet and lifestyle, regular exercise, weight management.  Bp high today  Fasting  chloride (PF) 0.9 % injection 2 mL  2 mL Intracatheter 2 times per day Marty Herron MD       • enoxaparin (LOVENOX) injection 40 mg  40 mg Subcutaneous Nightly Marty Herron MD       • sodium chloride (NORMAL SALINE) 0.9 % bolus 500 mL  500 mL Intravenous PRN Marty Herron MD           Imaging  XR CHEST PA OR AP 1 VIEW   Final Result      Evidence of a skin fold or other external artifact overlying the right   upper lung field.  The lungs are otherwise unremarkable.      Electronically Signed by: VICENTA FRANCO M.D.    Signed on: 2/4/2023 2:51 AM               All labs and imaging were personally reviewed.  Please see the chart for details or below for specific positive findings.    Assessment and Plan   Jojo Reyna is a 89 year old female with PMH of hypercalcemia of malignancy, hormone positive breast cancer 1997 s/p mastectomy, T2DM, HTN, HLD, anxiety, osteoarthritis admitted to the ED due to abnormal labs (calcium of 13.5) by her endocrinologist Dr. Wilkins.    #Hypercalcemia of malignancy  #Bone metastasis  - s/p Zoledronic acid 4 mg x1 in ED  - s/p calcitonin x1 in ED  -Previous admission had already completed hyperCa workup, had elevated PTHrP  - Will defer to endocrinology regarding further doses of calcitonin, zoledronic acid  - Continue NS at 150 ml/hr  - Daily CMP/CBC  - Replete electrolytes as needed  - Endocrinology on consult, appreciate recommendations  - Oncology on consult, appreciate recommendations    #HTN  - On amlodipine and losartan at home  Plan:  - Continue home meds     #Anxiety  - Patient takes Xanax at home  Plan:  - Hold off home Xanax given hypercalcemia w/ concerns of altered mental status     #T2DM  - LDISS  - Hypoglycemia protocol     #HLD  - Patient does not take home medications      #h/o R breast cancer  - s/p mastectomy in 1997 for pT1N0 Er/Pr positive breast cancer  - Received 5 years of adjuvant tamoxifen       FEN: Monitor and replete lytes prn.   PPX: SCDs.  labs ordered  Follow up 2 weeks for bp  Patient Instructions   For blood pressure:  Increase lisinopril hydrochlorothiazide to 20/25mg daily  Continue verapamil         Lovenox  ACTIVITY: Advance per baseline.    CODE STATUS:   Code Status: Prior DNR/DNI-full let  Primary Care Provider: Ashanti Barnes MD     Assessment and plan discussed with attending. Please see attending addendum or note for final recommendations.    Johny Burton MD  Internal Medicine, PGY-1  [employer] to reach

## 2023-02-07 ENCOUNTER — OFFICE VISIT (OUTPATIENT)
Dept: INTERNAL MEDICINE | Facility: CLINIC | Age: 63
End: 2023-02-07
Payer: MEDICARE

## 2023-02-07 VITALS
HEIGHT: 60 IN | BODY MASS INDEX: 28 KG/M2 | TEMPERATURE: 98 F | HEART RATE: 82 BPM | DIASTOLIC BLOOD PRESSURE: 74 MMHG | OXYGEN SATURATION: 95 % | WEIGHT: 142.63 LBS | SYSTOLIC BLOOD PRESSURE: 130 MMHG

## 2023-02-07 DIAGNOSIS — F41.1 GAD (GENERALIZED ANXIETY DISORDER): ICD-10-CM

## 2023-02-07 DIAGNOSIS — J44.1 CHRONIC OBSTRUCTIVE PULMONARY DISEASE WITH ACUTE EXACERBATION: Primary | ICD-10-CM

## 2023-02-07 DIAGNOSIS — I10 ESSENTIAL HYPERTENSION: ICD-10-CM

## 2023-02-07 PROCEDURE — 3075F SYST BP GE 130 - 139MM HG: CPT | Mod: CPTII,S$GLB,, | Performed by: FAMILY MEDICINE

## 2023-02-07 PROCEDURE — 3008F BODY MASS INDEX DOCD: CPT | Mod: CPTII,S$GLB,, | Performed by: FAMILY MEDICINE

## 2023-02-07 PROCEDURE — 99214 PR OFFICE/OUTPT VISIT, EST, LEVL IV, 30-39 MIN: ICD-10-PCS | Mod: S$GLB,,, | Performed by: FAMILY MEDICINE

## 2023-02-07 PROCEDURE — 1160F PR REVIEW ALL MEDS BY PRESCRIBER/CLIN PHARMACIST DOCUMENTED: ICD-10-PCS | Mod: CPTII,S$GLB,, | Performed by: FAMILY MEDICINE

## 2023-02-07 PROCEDURE — 99999 PR PBB SHADOW E&M-EST. PATIENT-LVL V: ICD-10-PCS | Mod: PBBFAC,,, | Performed by: FAMILY MEDICINE

## 2023-02-07 PROCEDURE — 3008F PR BODY MASS INDEX (BMI) DOCUMENTED: ICD-10-PCS | Mod: CPTII,S$GLB,, | Performed by: FAMILY MEDICINE

## 2023-02-07 PROCEDURE — 1159F PR MEDICATION LIST DOCUMENTED IN MEDICAL RECORD: ICD-10-PCS | Mod: CPTII,S$GLB,, | Performed by: FAMILY MEDICINE

## 2023-02-07 PROCEDURE — 3075F PR MOST RECENT SYSTOLIC BLOOD PRESS GE 130-139MM HG: ICD-10-PCS | Mod: CPTII,S$GLB,, | Performed by: FAMILY MEDICINE

## 2023-02-07 PROCEDURE — 99999 PR PBB SHADOW E&M-EST. PATIENT-LVL V: CPT | Mod: PBBFAC,,, | Performed by: FAMILY MEDICINE

## 2023-02-07 PROCEDURE — 99214 OFFICE O/P EST MOD 30 MIN: CPT | Mod: S$GLB,,, | Performed by: FAMILY MEDICINE

## 2023-02-07 PROCEDURE — 1159F MED LIST DOCD IN RCRD: CPT | Mod: CPTII,S$GLB,, | Performed by: FAMILY MEDICINE

## 2023-02-07 PROCEDURE — 3078F DIAST BP <80 MM HG: CPT | Mod: CPTII,S$GLB,, | Performed by: FAMILY MEDICINE

## 2023-02-07 PROCEDURE — 3078F PR MOST RECENT DIASTOLIC BLOOD PRESSURE < 80 MM HG: ICD-10-PCS | Mod: CPTII,S$GLB,, | Performed by: FAMILY MEDICINE

## 2023-02-07 PROCEDURE — 1160F RVW MEDS BY RX/DR IN RCRD: CPT | Mod: CPTII,S$GLB,, | Performed by: FAMILY MEDICINE

## 2023-02-07 RX ORDER — PREDNISONE 10 MG/1
1 TABLET ORAL DAILY
COMMUNITY
Start: 2023-01-23 | End: 2023-02-07 | Stop reason: DRUGHIGH

## 2023-02-07 RX ORDER — GUAIFENESIN 600 MG/1
1200 TABLET, EXTENDED RELEASE ORAL 2 TIMES DAILY
COMMUNITY
Start: 2023-02-04 | End: 2023-02-11

## 2023-02-07 RX ORDER — CLOTRIMAZOLE 10 MG/1
10 LOZENGE ORAL; TOPICAL
COMMUNITY
Start: 2023-02-04 | End: 2023-09-12

## 2023-02-07 RX ORDER — BENZONATATE 200 MG/1
200 CAPSULE ORAL 3 TIMES DAILY PRN
COMMUNITY
Start: 2023-02-04 | End: 2023-02-11

## 2023-02-07 RX ORDER — FLUTICASONE PROPIONATE 50 MCG
2 SPRAY, SUSPENSION (ML) NASAL DAILY PRN
COMMUNITY

## 2023-02-07 RX ORDER — PREDNISONE 20 MG/1
2 TABLET ORAL DAILY
COMMUNITY
Start: 2023-02-06 | End: 2023-02-20

## 2023-02-07 RX ORDER — PREDNISONE 20 MG/1
20 TABLET ORAL DAILY
COMMUNITY
Start: 2023-02-21 | End: 2023-02-07

## 2023-02-07 RX ORDER — DOXYCYCLINE 100 MG/1
100 CAPSULE ORAL 2 TIMES DAILY
COMMUNITY
Start: 2023-02-04 | End: 2023-02-23 | Stop reason: ALTCHOICE

## 2023-02-07 RX ORDER — ALBUTEROL SULFATE 0.83 MG/ML
SOLUTION RESPIRATORY (INHALATION)
COMMUNITY
Start: 2023-02-06 | End: 2023-06-21 | Stop reason: SDUPTHER

## 2023-02-07 NOTE — PROGRESS NOTES
Subjective:      Patient ID: Kenya Majano is a 62 y.o. female.    Chief Complaint: Follow-up    62 y.o.   female patient with a PMHx of anxiety, arthritis, asthma, CAD, COPD, HTN, and emphysema of lung presents to clinic for follow-up. The patient was last seen on 2022      Today she reports she was seen in the hospital then ER for COPD symptoms. Patient reports she is now on a nebulizer and was advised to see pulmonology.   She is on a prednisone taper.  She is on doxycycline.  She has stopped smoked since this happened.  Still tired and SOB at times.  Tested + for strep the last visit to ER//given Biaxin.  Still with some hoarseness.  Her verapamil has been on hold b/c of low BP.   Monitoring at home, readings similar to what we got today.  Albuterol and prednisone are causing her some antsy/anxious feelings.  She is taking her zoloft each AM and then using klonopin PRN.    Past Medical History:   Diagnosis Date    Anxiety     Arthritis     Asthma     Cataract     Coronary artery disease     Emphysema of lung     Glaucoma     Hypertension     Seizures     Stroke      Family History   Problem Relation Age of Onset    Diabetes Mother     Stroke Mother     Heart disease Father     Diabetes Maternal Grandmother     No Known Problems Maternal Grandfather     Hearing loss Paternal Grandmother     Hearing loss Paternal Grandfather      Past Surgical History:   Procedure Laterality Date    APPENDECTOMY       SECTION      CHOLECYSTECTOMY      EYE SURGERY      HYSTERECTOMY      around     OOPHORECTOMY      around      Social History     Tobacco Use    Smoking status: Former     Packs/day: 1.00     Years: 41.00     Pack years: 41.00     Types: Cigarettes     Quit date: 2023     Years since quittin.1    Smokeless tobacco: Never   Substance Use Topics    Alcohol use: Never    Drug use: Never       /74   Pulse 82   Temp 98.3 °F (36.8 °C) (Oral)   Ht 5' (1.524 m)   Wt 64.7  kg (142 lb 10.2 oz)   LMP  (LMP Unknown)   SpO2 95%   BMI 27.86 kg/m²     Review of Systems   Constitutional:  Positive for activity change and fatigue. Negative for appetite change, chills, diaphoresis, fever and unexpected weight change.   HENT:  Negative for congestion, ear pain, hearing loss, postnasal drip, rhinorrhea, sinus pain and sore throat.    Eyes:  Negative for pain, discharge, itching and visual disturbance.   Respiratory:  Positive for cough, chest tightness and shortness of breath. Negative for wheezing.    Cardiovascular:  Negative for chest pain, palpitations and leg swelling.   Gastrointestinal:  Negative for abdominal pain, constipation, diarrhea, nausea and vomiting.   Endocrine: Negative for polydipsia and polyuria.   Genitourinary:  Negative for difficulty urinating, dysuria, flank pain, frequency, menstrual problem, pelvic pain and urgency.   Musculoskeletal:  Negative for arthralgias, back pain, joint swelling, myalgias and neck pain.   Skin:  Negative for color change and rash.   Neurological:  Negative for dizziness, weakness, light-headedness and headaches.   Hematological:  Negative for adenopathy.   Psychiatric/Behavioral:  Negative for confusion, decreased concentration and dysphoric mood.      Objective:     Physical Exam  Vitals and nursing note reviewed.   Constitutional:       General: She is not in acute distress.  HENT:      Right Ear: External ear normal.      Left Ear: External ear normal.      Nose: Nose normal.   Eyes:      Conjunctiva/sclera: Conjunctivae normal.      Pupils: Pupils are equal, round, and reactive to light.   Cardiovascular:      Rate and Rhythm: Normal rate and regular rhythm.      Heart sounds: Normal heart sounds.   Pulmonary:      Effort: Pulmonary effort is normal. No respiratory distress.      Breath sounds: No wheezing or rales.      Comments: Mildly decreased breath sounds in lower fields.  Abdominal:      General: Bowel sounds are normal. There is  no distension.      Palpations: Abdomen is soft.      Tenderness: There is no abdominal tenderness. There is no guarding.   Musculoskeletal:      Cervical back: Normal range of motion and neck supple.      Right lower leg: No edema.      Left lower leg: No edema.   Skin:     General: Skin is warm and dry.      Findings: No rash.   Neurological:      Mental Status: She is alert and oriented to person, place, and time.   Psychiatric:         Behavior: Behavior normal.         Thought Content: Thought content normal.         Judgment: Judgment normal.       Lab Results   Component Value Date    WBC 6.83 12/13/2022    HGB 16.1 (H) 12/13/2022    HCT 47.2 12/13/2022     12/13/2022    CHOL 201 (H) 12/13/2022    TRIG 142 12/13/2022    HDL 45 12/13/2022    ALT 20 12/13/2022    AST 19 12/13/2022     12/13/2022    K 4.5 12/13/2022     12/13/2022    CREATININE 0.8 12/13/2022    BUN 13 12/13/2022    CO2 27 12/13/2022    TSH 3.454 12/13/2022    HGBA1C 4.9 08/18/2021       Assessment:     1. Chronic obstructive pulmonary disease with acute exacerbation    2. Essential hypertension    3. OTONIEL (generalized anxiety disorder)         Plan:     Chronic obstructive pulmonary disease with acute exacerbation  -     Ambulatory referral/consult to Pulmonology; Future; Expected date: 02/14/2023    Essential hypertension    OTONIEL (generalized anxiety disorder)        Vitals reviewed. BP within normal range at 130/74.  Cont to hold Verapamil for now.  Monitor BP//if running high/let MD Know.  Referral placed to pulmonology.  Cont zoloft and klonopin.    F/u 3-4 mos        Documentation entered by Matty Bryson, acting as scribe for Dr. David Carty. 02/07/2023 1:35 PM.

## 2023-02-15 ENCOUNTER — OFFICE VISIT (OUTPATIENT)
Dept: ORTHOPEDICS | Facility: CLINIC | Age: 63
End: 2023-02-15
Payer: MEDICARE

## 2023-02-15 DIAGNOSIS — M65.9: ICD-10-CM

## 2023-02-15 DIAGNOSIS — M75.21 BICEPS TENDINITIS OF RIGHT SHOULDER: Primary | ICD-10-CM

## 2023-02-15 DIAGNOSIS — M25.511 RIGHT SHOULDER PAIN, UNSPECIFIED CHRONICITY: ICD-10-CM

## 2023-02-15 PROCEDURE — 99999 PR PBB SHADOW E&M-EST. PATIENT-LVL III: ICD-10-PCS | Mod: PBBFAC,,, | Performed by: STUDENT IN AN ORGANIZED HEALTH CARE EDUCATION/TRAINING PROGRAM

## 2023-02-15 PROCEDURE — 1159F MED LIST DOCD IN RCRD: CPT | Mod: CPTII,S$GLB,, | Performed by: STUDENT IN AN ORGANIZED HEALTH CARE EDUCATION/TRAINING PROGRAM

## 2023-02-15 PROCEDURE — 99213 OFFICE O/P EST LOW 20 MIN: CPT | Mod: S$GLB,,, | Performed by: STUDENT IN AN ORGANIZED HEALTH CARE EDUCATION/TRAINING PROGRAM

## 2023-02-15 PROCEDURE — 1160F PR REVIEW ALL MEDS BY PRESCRIBER/CLIN PHARMACIST DOCUMENTED: ICD-10-PCS | Mod: CPTII,S$GLB,, | Performed by: STUDENT IN AN ORGANIZED HEALTH CARE EDUCATION/TRAINING PROGRAM

## 2023-02-15 PROCEDURE — 99213 PR OFFICE/OUTPT VISIT, EST, LEVL III, 20-29 MIN: ICD-10-PCS | Mod: S$GLB,,, | Performed by: STUDENT IN AN ORGANIZED HEALTH CARE EDUCATION/TRAINING PROGRAM

## 2023-02-15 PROCEDURE — 1159F PR MEDICATION LIST DOCUMENTED IN MEDICAL RECORD: ICD-10-PCS | Mod: CPTII,S$GLB,, | Performed by: STUDENT IN AN ORGANIZED HEALTH CARE EDUCATION/TRAINING PROGRAM

## 2023-02-15 PROCEDURE — 99999 PR PBB SHADOW E&M-EST. PATIENT-LVL III: CPT | Mod: PBBFAC,,, | Performed by: STUDENT IN AN ORGANIZED HEALTH CARE EDUCATION/TRAINING PROGRAM

## 2023-02-15 PROCEDURE — 1160F RVW MEDS BY RX/DR IN RCRD: CPT | Mod: CPTII,S$GLB,, | Performed by: STUDENT IN AN ORGANIZED HEALTH CARE EDUCATION/TRAINING PROGRAM

## 2023-02-15 NOTE — PATIENT INSTRUCTIONS
Assessment:  Kenya Majano is a 62 y.o. female with a chief complaint of Follow-up (R shoulder pain s/p biceps CSI 1/13/23)      Encounter Diagnoses   Name Primary?    Biceps tendinitis of right shoulder Yes    Tenosynovitis of shoulder     Right shoulder pain, unspecified chronicity         Plan:  Patient is feeling very good at this time, near complete pain relief following proximal biceps tendon sheath injection at last visit, she is now approx. 1 month out from that injection  She still has a little bit of discomfort when reaching behind her back in internal rotation, as well as a little bit of discomfort when we stress her anterior labrum  Overall, she has very good strength, very good functionality at this time, and she is satisfied with the results thus far  She will follow-up on an as-needed basis, we discussed that if this pain recurs at the side, she may benefit from repeat injection, and I would likely recommend for formal physical therapy at that time  Okay to take over-the-counter nonsteroid anti-inflammatories and/or Tylenol, as needed for pain and discomfort    Follow-up:  As needed or sooner if there are any problems between now and then.    Thank you for choosing [facility] and Dr. Lit Rene for your orthopedic & sports medicine care. It is our goal to provide you with exceptional care that will help keep you healthy, active, and get you back in the game.    Please do not hesitate to reach out to us via email, phone, or MyChart with any questions, concerns, or feedback.    If you are experiencing pain/discomfort ,or have questions after 5pm and would like to be connected to the [facility] on-call team, please call this number and specify which Sports Medicine provider is treating you: [phone]

## 2023-02-15 NOTE — PROGRESS NOTES
Patient ID: Kenya Majano  YOB: 1960  MRN: [id number]    Chief Complaint: Follow-up (R shoulder pain s/p biceps CSI 23)    Referred By: KATERINE Miller    Occupation: Retired    History of Present Illness: Kenya Majano is a right-hand dominant 62 y.o. female who presents today c/o right shoulder pain.     Patient states the pain began in 2022. She denies any injury. She describes the pain as a constant ache with intermittent sharp and burning pain. She states the pain radiates down her arm with numbness and tingling. Her pain is located on the anterior right shoulder. She has tried Excedrin and tylenol without relief. She states that tiger balm and wearing a shoulder brace offers some relief. Her pain is made worse when lifting and reaching inwards or behind her. Pain increases when she does activities such as crotcheting or getting items in and out of refrigerator, or vacuuming, fixing hair and unable to put bra on and unable to put shirt on.     Initially evaluated in the office on 23.  She was diagnosed with biceps tendonitis and treated with a biceps tendon cortisone injection.  She reports excellent relief with this treatment and currently has no complaints.  Occasionally she will have mild pain with reaching behind her back.      Past Medical History:   Past Medical History:   Diagnosis Date    Anxiety     Arthritis     Asthma     Cataract     Coronary artery disease     Emphysema of lung     Glaucoma     Hypertension     Seizures     Stroke      Past Surgical History:   Procedure Laterality Date    APPENDECTOMY       SECTION      CHOLECYSTECTOMY      EYE SURGERY      HYSTERECTOMY      around     OOPHORECTOMY      around      Family History   Problem Relation Age of Onset    Diabetes Mother     Stroke Mother     Heart disease Father     Diabetes Maternal Grandmother     No Known Problems Maternal Grandfather     Hearing loss  Paternal Grandmother     Hearing loss Paternal Grandfather      Social History     Socioeconomic History    Marital status:     Number of children: 3   Tobacco Use    Smoking status: Former     Packs/day: 1.00     Years: 41.00     Pack years: 41.00     Types: Cigarettes     Quit date: 2023     Years since quittin.1    Smokeless tobacco: Never   Substance and Sexual Activity    Alcohol use: Never    Drug use: Never    Sexual activity: Yes     Partners: Male     Medication List with Changes/Refills   Current Medications    ALBUTEROL (PROVENTIL) 2.5 MG /3 ML (0.083 %) NEBULIZER SOLUTION    Take by nebulization.    ALBUTEROL (PROVENTIL/VENTOLIN HFA) 90 MCG/ACTUATION INHALER    Inhale 2 puffs into the lungs every 4 (four) hours as needed for Wheezing.    BIMATOPROST (LUMIGAN) 0.03 % OPHTHALMIC DROPS    Apply 1 drop to eye.    BRINZOLAMIDE-BRIMONIDINE (SIMBRINZA) 1-0.2 % DRPS    Apply 1 drop to eye.    BUDESONIDE-FORMOTEROL 160-4.5 MCG (SYMBICORT) 160-4.5 MCG/ACTUATION HFAA    Inhale 2 puffs into the lungs every 12 (twelve) hours.    CARBAMAZEPINE (TEGRETOL) 200 MG TABLET    Take 200 mg by mouth 2 (two) times daily.    CLONAZEPAM (KLONOPIN) 1 MG TABLET    Take 1 tablet by mouth twice daily as needed for anxiety    CLOPIDOGREL (PLAVIX) 75 MG TABLET    Take 1 tablet (75 mg total) by mouth once daily.    CLOTRIMAZOLE (MYCELEX) 10 MG TYSON    Take 10 mg by mouth 5 (five) times daily.    DOXYCYCLINE (VIBRAMYCIN) 100 MG CAP    Take 100 mg by mouth 2 (two) times daily.    EZETIMIBE (ZETIA) 10 MG TABLET    Take 1 tablet (10 mg total) by mouth once daily.    FLUTICASONE PROPIONATE (FLONASE) 50 MCG/ACTUATION NASAL SPRAY    1 spray by Nasal route daily as needed.    LINZESS 290 MCG CAP CAPSULE    Take 1 capsule (290 mcg total) by mouth once daily.    LISINOPRIL-HYDROCHLOROTHIAZIDE (PRINZIDE,ZESTORETIC) 20-25 MG TAB    Take 1 tablet by mouth once daily.    PREDNISONE (DELTASONE) 20 MG TABLET    Take 2 tablets by mouth  once daily.    PROMETHAZINE-DEXTROMETHORPHAN (PROMETHAZINE-DM) 6.25-15 MG/5 ML SYRP    Take 5 mLs by mouth nightly as needed (Cough).    ROSUVASTATIN (CRESTOR) 40 MG TAB    Take 1 tablet (40 mg total) by mouth once daily.    SERTRALINE (ZOLOFT) 100 MG TABLET    Take 1 tablet (100 mg total) by mouth once daily.    VERAPAMIL (CALAN-SR) 120 MG CR TABLET    Take 1 tablet (120 mg total) by mouth once daily.     Review of patient's allergies indicates:   Allergen Reactions    Sulfa (sulfonamide antibiotics) Swelling    Dimenhydrinate Other (See Comments)    Sumatriptan     Latex Rash       Physical Exam:   There is no height or weight on file to calculate BMI.    Physical Exam  Constitutional:       General: She is not in acute distress.     Appearance: Normal appearance.   HENT:      Head: Normocephalic and atraumatic.   Eyes:      Extraocular Movements: Extraocular movements intact.      Conjunctiva/sclera: Conjunctivae normal.   Pulmonary:      Effort: Pulmonary effort is normal. No respiratory distress.   Skin:     General: Skin is warm and dry.   Neurological:      General: No focal deficit present.      Mental Status: She is alert and oriented to person, place, and time.   Psychiatric:         Mood and Affect: Mood normal.         Detailed MSK exam:     Right Shoulder:  Inspection: Normal  Palpation tenderness: Nontender  Range of motion: 110 deg Flexion         100 deg External Rotation in Adduction         IR to mid lumbar region, with some mild anterior shoulder pain  Strength:  5/5 Abduction    5/5 External Rotation in Adduction    5/5 Internal Rotation   Tests:  Negative Speed's/Yergason's    Negative impingement testing    Negative Calvin's testing, although with some mild anterior shoulder  N/V Exam:  Radial: Normal motor (EPL/thumbs up)              Normal sensory (dorsal hand)   Median: Normal motor (FPL/A-OK)      Normal sensory (thumb)   Ulnar:  Normal motor (Interossei/scissors-spread)     Normal  sensory (5th finger)   LABC: Normal sensory (lateral forearm)   MABC: Normal sensory (medial forearm)   MC: Normal motor (elbow flexion)   Axillary: Normal motor/sensory (deltoid)  Normal radial and ulnar pulses, warm and well perfused with capillary refill < 2 sec     Imaging:  No new imaging today.      Patient Instructions   Assessment:  Kenya Majano is a 62 y.o. female with a chief complaint of Follow-up (R shoulder pain s/p biceps CSI 1/13/23)      Encounter Diagnoses   Name Primary?    Biceps tendinitis of right shoulder Yes    Tenosynovitis of shoulder     Right shoulder pain, unspecified chronicity         Plan:  Patient is feeling very good at this time, near complete pain relief following proximal biceps tendon sheath injection at last visit, she is now approx. 1 month out from that injection  She still has a little bit of discomfort when reaching behind her back in internal rotation, as well as a little bit of discomfort when we stress her anterior labrum  Overall, she has very good strength, very good functionality at this time, and she is satisfied with the results thus far  She will follow-up on an as-needed basis, we discussed that if this pain recurs at the side, she may benefit from repeat injection, and I would likely recommend for formal physical therapy at that time  Okay to take over-the-counter nonsteroid anti-inflammatories and/or Tylenol, as needed for pain and discomfort    Follow-up:  As needed or sooner if there are any problems between now and then.    Thank you for choosing [facility] and Dr. Lit Rene for your orthopedic & sports medicine care. It is our goal to provide you with exceptional care that will help keep you healthy, active, and get you back in the game.    Please do not hesitate to reach out to us via email, phone, or MyChart with any questions, concerns, or feedback.    If you are experiencing pain/discomfort ,or have questions after  5pm and would like to be connected to the [facility] on-call team, please call this number and specify which Sports Medicine provider is treating you: [phone]           A copy of today's visit note has been sent to the referring provider.       Lit Rene MD  Primary Care Sports Medicine        Disclaimer: This note was prepared using a voice recognition system and is likely to have sound alike errors within the text.

## 2023-02-20 ENCOUNTER — TELEPHONE (OUTPATIENT)
Dept: INTERNAL MEDICINE | Facility: CLINIC | Age: 63
End: 2023-02-20
Payer: MEDICARE

## 2023-02-20 NOTE — TELEPHONE ENCOUNTER
----- Message from Shakila Epstein sent at 2/20/2023  9:04 AM CST -----  Pt is requesting a call back concerning the medication that was sent to the pharmacy not being available. Call back number [phone]  Thx jm

## 2023-02-22 RX ORDER — CLONAZEPAM 0.5 MG/1
0.5 TABLET ORAL 2 TIMES DAILY PRN
COMMUNITY
End: 2023-02-22 | Stop reason: SDUPTHER

## 2023-02-22 RX ORDER — CLONAZEPAM 0.5 MG/1
0.5 TABLET ORAL 2 TIMES DAILY PRN
Qty: 60 TABLET | Refills: 1 | Status: SHIPPED | OUTPATIENT
Start: 2023-02-22 | End: 2023-03-24

## 2023-02-22 NOTE — TELEPHONE ENCOUNTER
----- Message from Phillip Fulton sent at 2/22/2023 10:29 AM CST -----  Contact: [phone]  Pt would like to consult with a nurse in regards to clonazePAM (KLONOPIN) 1 MG tablet. Pt stated that the [facility] pharmacy can only get the 0.5 mg tablet. And patient is requesting that a new script be sent over to the pharmacy equaling to the 1mg tablet that she takes. Please send it over to   [address] [address] [place] [id number] [facility] [address]  Phone: [phone] Fax: [phone]       Please call pt back at [phone]. Thanks KB

## 2023-02-22 NOTE — TELEPHONE ENCOUNTER
No new care gaps identified.  [facility] Embedded Care Gaps. Reference number: [id number]. 2/22/2023   10:57:50 AM CST

## 2023-02-23 ENCOUNTER — OFFICE VISIT (OUTPATIENT)
Dept: PULMONOLOGY | Facility: CLINIC | Age: 63
End: 2023-02-23
Payer: MEDICARE

## 2023-02-23 ENCOUNTER — LAB VISIT (OUTPATIENT)
Dept: LAB | Facility: HOSPITAL | Age: 63
End: 2023-02-23
Attending: INTERNAL MEDICINE
Payer: MEDICARE

## 2023-02-23 ENCOUNTER — CLINICAL SUPPORT (OUTPATIENT)
Dept: PULMONOLOGY | Facility: CLINIC | Age: 63
End: 2023-02-23
Payer: MEDICARE

## 2023-02-23 VITALS
RESPIRATION RATE: 18 BRPM | HEART RATE: 118 BPM | DIASTOLIC BLOOD PRESSURE: 76 MMHG | WEIGHT: 140.19 LBS | HEIGHT: 60 IN | BODY MASS INDEX: 27.52 KG/M2 | SYSTOLIC BLOOD PRESSURE: 120 MMHG | OXYGEN SATURATION: 97 %

## 2023-02-23 VITALS — HEIGHT: 60 IN | BODY MASS INDEX: 27.52 KG/M2 | WEIGHT: 140.19 LBS

## 2023-02-23 DIAGNOSIS — R09.02 EXERCISE HYPOXEMIA: ICD-10-CM

## 2023-02-23 DIAGNOSIS — R05.3 CHRONIC COUGH: ICD-10-CM

## 2023-02-23 DIAGNOSIS — J44.1 CHRONIC OBSTRUCTIVE PULMONARY DISEASE WITH ACUTE EXACERBATION: ICD-10-CM

## 2023-02-23 DIAGNOSIS — R09.02 EXERCISE HYPOXEMIA: Primary | ICD-10-CM

## 2023-02-23 DIAGNOSIS — F41.1 GAD (GENERALIZED ANXIETY DISORDER): ICD-10-CM

## 2023-02-23 DIAGNOSIS — J43.8 OTHER EMPHYSEMA: ICD-10-CM

## 2023-02-23 DIAGNOSIS — J30.89 SEASONAL ALLERGIC RHINITIS DUE TO OTHER ALLERGIC TRIGGER: ICD-10-CM

## 2023-02-23 LAB — RHEUMATOID FACT SERPL-ACNC: <13 IU/ML (ref 0–15)

## 2023-02-23 PROCEDURE — 96372 PR INJECTION,THERAP/PROPH/DIAG2ST, IM OR SUBCUT: ICD-10-PCS | Mod: S$GLB,,, | Performed by: INTERNAL MEDICINE

## 2023-02-23 PROCEDURE — 86038 ANTINUCLEAR ANTIBODIES: CPT | Performed by: INTERNAL MEDICINE

## 2023-02-23 PROCEDURE — 86431 RHEUMATOID FACTOR QUANT: CPT | Performed by: INTERNAL MEDICINE

## 2023-02-23 PROCEDURE — 96372 THER/PROPH/DIAG INJ SC/IM: CPT | Mod: S$GLB,,, | Performed by: INTERNAL MEDICINE

## 2023-02-23 PROCEDURE — 1159F MED LIST DOCD IN RCRD: CPT | Mod: CPTII,S$GLB,, | Performed by: INTERNAL MEDICINE

## 2023-02-23 PROCEDURE — 99999 PR PBB SHADOW E&M-EST. PATIENT-LVL I: ICD-10-PCS | Mod: PBBFAC,,,

## 2023-02-23 PROCEDURE — 86003 ALLG SPEC IGE CRUDE XTRC EA: CPT | Mod: 59 | Performed by: INTERNAL MEDICINE

## 2023-02-23 PROCEDURE — 99999 PR PBB SHADOW E&M-EST. PATIENT-LVL V: ICD-10-PCS | Mod: PBBFAC,,, | Performed by: INTERNAL MEDICINE

## 2023-02-23 PROCEDURE — 82164 ANGIOTENSIN I ENZYME TEST: CPT | Performed by: INTERNAL MEDICINE

## 2023-02-23 PROCEDURE — 99205 OFFICE O/P NEW HI 60 MIN: CPT | Mod: 25,S$GLB,, | Performed by: INTERNAL MEDICINE

## 2023-02-23 PROCEDURE — 36415 COLL VENOUS BLD VENIPUNCTURE: CPT | Performed by: INTERNAL MEDICINE

## 2023-02-23 PROCEDURE — 85652 RBC SED RATE AUTOMATED: CPT | Performed by: INTERNAL MEDICINE

## 2023-02-23 PROCEDURE — 3078F PR MOST RECENT DIASTOLIC BLOOD PRESSURE < 80 MM HG: ICD-10-PCS | Mod: CPTII,S$GLB,, | Performed by: INTERNAL MEDICINE

## 2023-02-23 PROCEDURE — 3074F PR MOST RECENT SYSTOLIC BLOOD PRESSURE < 130 MM HG: ICD-10-PCS | Mod: CPTII,S$GLB,, | Performed by: INTERNAL MEDICINE

## 2023-02-23 PROCEDURE — 3008F PR BODY MASS INDEX (BMI) DOCUMENTED: ICD-10-PCS | Mod: CPTII,S$GLB,, | Performed by: INTERNAL MEDICINE

## 2023-02-23 PROCEDURE — 94618 PULMONARY STRESS TESTING: ICD-10-PCS | Mod: S$GLB,,, | Performed by: INTERNAL MEDICINE

## 2023-02-23 PROCEDURE — 95012 NITRIC OXIDE EXP GAS DETER: CPT | Mod: S$GLB,,, | Performed by: INTERNAL MEDICINE

## 2023-02-23 PROCEDURE — 94618 PULMONARY STRESS TESTING: CPT | Mod: S$GLB,,, | Performed by: INTERNAL MEDICINE

## 2023-02-23 PROCEDURE — 99999 PR PBB SHADOW E&M-EST. PATIENT-LVL V: CPT | Mod: PBBFAC,,, | Performed by: INTERNAL MEDICINE

## 2023-02-23 PROCEDURE — 1159F PR MEDICATION LIST DOCUMENTED IN MEDICAL RECORD: ICD-10-PCS | Mod: CPTII,S$GLB,, | Performed by: INTERNAL MEDICINE

## 2023-02-23 PROCEDURE — 86003 ALLG SPEC IGE CRUDE XTRC EA: CPT | Performed by: INTERNAL MEDICINE

## 2023-02-23 PROCEDURE — 3074F SYST BP LT 130 MM HG: CPT | Mod: CPTII,S$GLB,, | Performed by: INTERNAL MEDICINE

## 2023-02-23 PROCEDURE — 99205 PR OFFICE/OUTPT VISIT, NEW, LEVL V, 60-74 MIN: ICD-10-PCS | Mod: 25,S$GLB,, | Performed by: INTERNAL MEDICINE

## 2023-02-23 PROCEDURE — 99999 PR PBB SHADOW E&M-EST. PATIENT-LVL I: CPT | Mod: PBBFAC,,,

## 2023-02-23 PROCEDURE — 3078F DIAST BP <80 MM HG: CPT | Mod: CPTII,S$GLB,, | Performed by: INTERNAL MEDICINE

## 2023-02-23 PROCEDURE — 3008F BODY MASS INDEX DOCD: CPT | Mod: CPTII,S$GLB,, | Performed by: INTERNAL MEDICINE

## 2023-02-23 PROCEDURE — 95012 PR NITRIC OXIDE EXPIRED GAS DETERMINATION: ICD-10-PCS | Mod: S$GLB,,, | Performed by: INTERNAL MEDICINE

## 2023-02-23 RX ORDER — PREDNISONE 20 MG/1
TABLET ORAL
Qty: 20 TABLET | Refills: 0 | Status: SHIPPED | OUTPATIENT
Start: 2023-02-23 | End: 2023-03-27

## 2023-02-23 RX ORDER — FLUTICASONE PROPIONATE AND SALMETEROL 250; 50 UG/1; UG/1
1 POWDER RESPIRATORY (INHALATION) 2 TIMES DAILY
Qty: 60 EACH | Refills: 11 | Status: SHIPPED | OUTPATIENT
Start: 2023-02-23 | End: 2023-09-12

## 2023-02-23 RX ORDER — TRIAMCINOLONE ACETONIDE 40 MG/ML
40 INJECTION, SUSPENSION INTRA-ARTICULAR; INTRAMUSCULAR
Status: COMPLETED | OUTPATIENT
Start: 2023-02-23 | End: 2023-02-23

## 2023-02-23 RX ORDER — CLONAZEPAM 0.5 MG/1
1 TABLET, ORALLY DISINTEGRATING ORAL 2 TIMES DAILY PRN
Qty: 120 TABLET | Refills: 1 | Status: SHIPPED | OUTPATIENT
Start: 2023-02-23 | End: 2023-09-12

## 2023-02-23 RX ORDER — AZITHROMYCIN 250 MG/1
TABLET, FILM COATED ORAL
Qty: 6 TABLET | Refills: 0 | Status: SHIPPED | OUTPATIENT
Start: 2023-02-23 | End: 2023-03-30

## 2023-02-23 RX ORDER — PROMETHAZINE HYDROCHLORIDE AND CODEINE PHOSPHATE 6.25; 1 MG/5ML; MG/5ML
5 SOLUTION ORAL NIGHTLY PRN
Qty: 118 ML | Refills: 1 | Status: SHIPPED | OUTPATIENT
Start: 2023-02-23 | End: 2023-03-30

## 2023-02-23 RX ORDER — IPRATROPIUM BROMIDE 0.5 MG/2.5ML
500 SOLUTION RESPIRATORY (INHALATION) EVERY 6 HOURS
Qty: 300 ML | Refills: 11 | Status: SHIPPED | OUTPATIENT
Start: 2023-02-23 | End: 2023-11-02 | Stop reason: ALTCHOICE

## 2023-02-23 RX ADMIN — TRIAMCINOLONE ACETONIDE 40 MG: 40 INJECTION, SUSPENSION INTRA-ARTICULAR; INTRAMUSCULAR at 11:02

## 2023-02-23 NOTE — PROCEDURES
Clinical Guide to Interpretation or FeNO Levels :    FeNO  (ppb) LOW INTERMEDIATE HIGH   ADULT VALUES < 25 25-50          > 50   Th2-driven Inflammation Unlikely Likely Significant     Patients FeNO level at this visit : 96 (ppb)    Interpretation of FeNO measurement in adults:  [] FENO is less than 25 ppb implies non eosinophilic airway inflammation or the absence of airway inflammation.    Comment: Low FENO (<25 ppb) in adult asthmatics with persistent symptoms suggests other etiologies for these symptoms and a lower likelihood of benefit from adding or increasing inhaled glucocorticoids.    [] FENO between 25 and 50 ppb in adults should be interpreted cautiously with reference to the clinical situation (eg, symptomatic, on or off therapy, current smoking).    [x] FENO greater than 50 ppb in adults  suggests eosinophilic airway inflammation   Comment: High FENO (>50 ppb) in adult asthmatics even with atypical symptoms suggests glucocorticoid responsiveness. High FENO (>50 ppb) can help identify poor adherence or uncontrolled inflammation in asthma patients with otherwise seemingly controlled asthma.    Discussion:  A FENO less than 25 ppb in adults and less than 20 ppb in children younger than 12 years of age implies noneosinophilic airway inflammation or the absence of airway inflammation.  A FENO greater than 50 ppb in adults or greater than 35 ppb in children suggests eosinophilic airway inflammation.   Values of FENO between 25 and 50 ppb in adults (20 to 35 ppb in children) should be interpreted cautiously with reference to the clinical situation (eg, symptomatic, on or off therapy, current smoking).  A rising FENO with a greater than 20 percent change and more than 25 ppb (20 ppb in children) from a previously stable level suggests increasing eosinophilic airway inflammation, but there are wide inter-individual differences.  A decrease in FENO greater than 20 percent for values over 50 ppb or more than 10  ppb for values less than 50 ppb may be clinically important.  ?FENO in other respiratory diseases - Several other diseases are associated with altered levels of exhaled NO: low levels of FENO have been noted in cystic fibrosis, current smoking, pulmonary hypertension, hypothermia, primary ciliary dyskinesia, and bronchopulmonary dysplasia. Elevated FENO has been noted in atopy, nonasthmatic eosinophilic bronchitis, COPD exacerbations, noncystic fibrosis bronchiectasis, and viral upper respiratory infections.    REFERENCE:  ATS Board of Directors, December 2004, and by the ERS Executive Committee, June 2004. ATS/ERS Recommendations for Standardized Procedures for the Online and Offline Measurement of Exhaled Lower Respiratory Nitric Oxide and Nasal Nitric Oxide. Guideline 2005

## 2023-02-23 NOTE — PROGRESS NOTES
Subjective:       Patient ID: Kenya Majano is a 62 y.o. female.    Chief Complaint:   HPI COPD  She presents for evaluation and treatment of COPD. The patient is currently having symptoms / an exacerbation. Current symptoms include chronic dyspnea, dyspnea after 1/2 blocks, non-productive cough, and wheezing. Symptoms have been present since several months ago and have been gradually worsening. She denies weight loss, chills, and fever. Associated symptoms include fatigue, poor exercise tolerance, and shortness of breath.  This episode appears to have been triggered by no identifiable factor. Treatments tried for the current exacerbation: albuterol nebulizer and inhaled steroid. The patient has been having similar episodes for approximately 2 months. She uses 1 pillows at night. Patient currently is not on home oxygen therapy.. The patient is having no constitutional symptoms, denying fever, chills, anorexia, or weight loss. The patient has been hospitalized for this condition before. She has a history of 41 pack years. The patient is experiencing exercise intolerance (difficulty walking 1 blocks on flat ground).  Hx of asthma  Cough in 2022 started - now worse  Stopped smoking in 2023    Kiel leos every 4 hours  Follow up from hospitalization  2023 Hospitalized  2023 Hospitalized   Now symptoms are recurring  Past Medical History:   Diagnosis Date    Anxiety     Arthritis     Asthma     Cataract     Coronary artery disease     Emphysema of lung     Glaucoma     Hypertension     Seizures     Stroke      Past Surgical History:   Procedure Laterality Date    APPENDECTOMY       SECTION      CHOLECYSTECTOMY      EYE SURGERY      HYSTERECTOMY      around     OOPHORECTOMY      around      Social History     Socioeconomic History    Marital status:     Number of children: 3   Tobacco Use    Smoking status: Former     Packs/day: 1.00     Years: 41.00     Pack years: 41.00      Types: Cigarettes     Start date: 1975     Quit date: 2023     Years since quittin.1    Smokeless tobacco: Never   Substance and Sexual Activity    Alcohol use: Never    Drug use: Never    Sexual activity: Yes     Partners: Male     @FAM@  Review of Systems   Constitutional:  Positive for fatigue. Negative for fever.   HENT:  Positive for postnasal drip, rhinorrhea and congestion.    Eyes:  Negative for redness and itching.   Respiratory:  Positive for cough, sputum production, shortness of breath, dyspnea on extertion, use of rescue inhaler and Paroxysmal Nocturnal Dyspnea.    Cardiovascular:  Negative for chest pain, palpitations and leg swelling.   Genitourinary:  Negative for difficulty urinating and hematuria.   Endocrine:  Negative for cold intolerance and heat intolerance.    Skin:  Negative for rash.   Gastrointestinal:  Negative for nausea and abdominal pain.   Neurological:  Negative for dizziness, syncope, weakness and light-headedness.   Hematological:  Negative for adenopathy. Does not bruise/bleed easily.   Psychiatric/Behavioral:  Negative for sleep disturbance. The patient is not nervous/anxious.      Objective:      /76   Pulse (!) 118   Resp 18   Ht 5' (1.524 m)   Wt 63.6 kg (140 lb 3.4 oz)   LMP  (LMP Unknown)   SpO2 97%   BMI 27.38 kg/m²   Physical Exam  Vitals and nursing note reviewed.   Constitutional:       Appearance: She is well-developed.   HENT:      Head: Normocephalic and atraumatic.      Nose: Congestion and rhinorrhea present.      Mouth/Throat:      Pharynx: No oropharyngeal exudate.   Eyes:      Conjunctiva/sclera: Conjunctivae normal.      Pupils: Pupils are equal, round, and reactive to light.   Neck:      Thyroid: No thyromegaly.      Vascular: No JVD.      Trachea: No tracheal deviation.   Cardiovascular:      Rate and Rhythm: Normal rate and regular rhythm.      Heart sounds: Normal heart sounds.   Pulmonary:      Effort: Pulmonary effort is normal. No  respiratory distress.      Breath sounds: Examination of the right-lower field reveals wheezing. Examination of the left-lower field reveals wheezing. Decreased breath sounds and wheezing present. No rhonchi or rales.   Chest:      Chest wall: No tenderness.   Abdominal:      General: Bowel sounds are normal.      Palpations: Abdomen is soft.   Musculoskeletal:         General: Normal range of motion.      Cervical back: Neck supple.   Lymphadenopathy:      Cervical: No cervical adenopathy.   Skin:     General: Skin is warm and dry.   Neurological:      Mental Status: She is alert and oriented to person, place, and time.     Personal Diagnostic Review  Chest X-Ray: I personally reviewed the films and findings are:, air trapping/emphysema  Mammo Digital Screening Bilat w/ Shahid  Narrative: Result:   Mammo Digital Screening Bilat w/ Shahid     History:  Patient is 62 y.o. and is seen for a screening mammogram.    Films Compared:  Prior images (if available) were compared.     Findings:  This procedure was performed using tomosynthesis. Computer-aided detection   was utilized in the interpretation of this examination.  The breasts have scattered areas of fibroglandular density. There is no   evidence of suspicious masses, calcifications, or other abnormal findings.  Impression: Bilateral  There is no mammographic evidence of malignancy.    BI-RADS Category:   Overall: 2 - Benign       Recommendation:  Routine screening mammogram in 1 year is recommended.    Your estimated lifetime risk of breast cancer (to age 85) based on   Tyrer-Cuzick risk assessment model is Tyrer-Cuzick: 4.83 %. According to   the American Cancer Society, patients with a lifetime breast cancer risk   of 20% or higher might benefit from supplemental screening tests.    Pulmonary function tests:  na  Pulmonary Studies Review 2/28/2023 2/23/2023 2/23/2023 2/7/2023 12/12/2022 2/11/2022 11/18/2021   SpO2 93 - 97 95 - 98 96   Ordering Provider - Álvaro NIELSEN  MD Daniel - - - - -   Interpreting Provider - Álvaro Sanchez MD - - - - -   Performing nurse/tech/RT - VT, RT - - - - -   Diagnosis - (No Data) - - - - -   Height 60 60 60 60 60 60 60   Weight 2303.37 2243.4 2243.4 2282.2 2186.96 2116.42 2105.83   BMI (Calculated) 28.1 27.4 27.4 27.9 26.7 25.8 25.7   Predicted Distance 335.37 339.73 339.73 336.61 344.1 355.55 356.17   Patient Race -  - - - - -   6MWT Status - completed without stopping - - - - -   Patient Reported - Dyspnea - - - - -   Was O2 used? - Yes - - - - -   Delivery Method - Cannula;Pull Tank - - - - -   6MW Distance walked (feet) - 1200 - - - - -   Distance walked (meters) - 365.76 - - - - -   Did patient stop? - No - - - - -   Type of assistive device(s) used? - no assistive devices - - - - -   Is extra documentation required for this patient? - Yes - - - - -   Oxygen Saturation - 92 - - - - -   Supplemental Oxygen - Room Air - - - - -   Heart Rate - 118 - - - - -   Blood Pressure - 97/63 - - - - -   Mookie Dyspnea Rating  - nothing at all - - - - -   Oxygen Saturation - 94 - - - - -   Supplemental Oxygen - 2 L/M - - - - -   Heart Rate - 138 - - - - -   Blood Pressure - 170/78 - - - - -   Mookie Dyspnea Rating  - very,very heavy - - - - -   Recovery Time (seconds) - 240 - - - - -   Oxygen Saturation - 96 - - - - -   Supplemental Oxygen - 2 L/M - - - - -   Heart Rate - 110 - - - - -   Blood Pressure - 168/69 - - - - -   Mookie Dyspnea Rating  - heavy - - - - -   Is procedure ready for interpretation? - Yes - - - - -   Did the patient stop or pause? - No - - - - -   Total Time Walked (Calculated) - 360 - - - - -   Total Laps Walked - 6 - - - - -   Final Partial Lap Distance (feet) - 0 - - - - -   Total Distance Feet (Calculated) - 1200 - - - - -   Total Distance Meters (Calculated) - 365.76 - - - - -   Predicted Distance Meters (Calculated) 474.89 478.78 478.78 476.26 482.44 492.8 493.49   Percentage of Predicted (Calculated) - 76.39 - - - - -   Peak  VO2 (Calculated) - 14.95 - - - - -   Mets - 4.27 - - - - -   Has The Patient Had a Previous Six Minute Walk Test? - No - - - - -   Oxygen Qualification? - No - - - - -     No flowsheet data found.      Assessment:       1. Exercise hypoxemia    2. Chronic obstructive pulmonary disease with acute exacerbation    3. OTONIEL (generalized anxiety disorder)    4. Seasonal allergic rhinitis due to other allergic trigger    5. Chronic cough    6. Other emphysema               Outpatient Encounter Medications as of 2/23/2023   Medication Sig Dispense Refill    albuterol (PROVENTIL) 2.5 mg /3 mL (0.083 %) nebulizer solution Take by nebulization.      albuterol (PROVENTIL/VENTOLIN HFA) 90 mcg/actuation inhaler Inhale 2 puffs into the lungs every 4 (four) hours as needed for Wheezing. 8.5 g 2    bimatoprost (LUMIGAN) 0.03 % ophthalmic drops Apply 1 drop to eye.      brinzolamide-brimonidine (SIMBRINZA) 1-0.2 % DrpS Apply 1 drop to eye.      carBAMazepine (TEGRETOL) 200 mg tablet Take 200 mg by mouth 2 (two) times daily.      clonazePAM (KLONOPIN) 0.5 MG tablet Take 1 tablet (0.5 mg total) by mouth 2 (two) times daily as needed for Anxiety. 60 tablet 1    clopidogreL (PLAVIX) 75 mg tablet Take 1 tablet (75 mg total) by mouth once daily. 90 tablet 3    clotrimazole (MYCELEX) 10 mg olegario Take 10 mg by mouth 5 (five) times daily.      ezetimibe (ZETIA) 10 mg tablet Take 1 tablet (10 mg total) by mouth once daily. 90 tablet 3    fluticasone propionate (FLONASE) 50 mcg/actuation nasal spray 2 sprays by Nasal route daily as needed.      LINZESS 290 mcg Cap capsule Take 1 capsule (290 mcg total) by mouth once daily. 90 capsule 3    lisinopriL-hydrochlorothiazide (PRINZIDE,ZESTORETIC) 20-25 mg Tab Take 1 tablet by mouth once daily. 90 tablet 3    promethazine-dextromethorphan (PROMETHAZINE-DM) 6.25-15 mg/5 mL Syrp Take 5 mLs by mouth nightly as needed (Cough). 180 mL 0    rosuvastatin (CRESTOR) 40 MG Tab Take 1 tablet (40 mg total) by  mouth once daily. 90 tablet 3    sertraline (ZOLOFT) 100 MG tablet Take 1 tablet (100 mg total) by mouth once daily. 90 tablet 3    verapamiL (CALAN-SR) 120 MG CR tablet Take 1 tablet (120 mg total) by mouth once daily. 90 tablet 3    [DISCONTINUED] budesonide-formoterol 160-4.5 mcg (SYMBICORT) 160-4.5 mcg/actuation HFAA Inhale 2 puffs into the lungs every 12 (twelve) hours. 30.6 g 3    [DISCONTINUED] doxycycline (VIBRAMYCIN) 100 MG Cap Take 100 mg by mouth 2 (two) times daily.      azithromycin (ZITHROMAX Z-PHAM) 250 MG tablet 500 mg on day 1 (two tablets) followed by 250 mg once daily on days 2-5 6 tablet 0    [] benzonatate (TESSALON) 200 MG capsule Take 200 mg by mouth 3 (three) times daily as needed.      clonazePAM (KLONOPIN) 0.5 MG disintegrating tablet Take 2 tablets (1 mg total) by mouth 2 (two) times daily as needed (anxiety). 120 tablet 1    fluticasone-salmeterol diskus inhaler 250-50 mcg Inhale 1 puff into the lungs 2 (two) times daily. Controller. Wash out mouth after use 60 each 11    [] guaiFENesin (MUCINEX) 600 mg 12 hr tablet Take 1,200 mg by mouth 2 (two) times a day.      ipratropium (ATROVENT) 0.02 % nebulizer solution Take 2.5 mLs (500 mcg total) by nebulization every 6 (six) hours. Rescue 300 mL 11    [] predniSONE (DELTASONE) 20 MG tablet Take 2 tablets by mouth once daily.      predniSONE (DELTASONE) 20 MG tablet Prednisone 60 mg/ day for 3 days, 40 mg/day for 3 days,20 mg/ day for 3 days, (1/2 tablet )10 mg a day for 3 days. 20 tablet 0    promethazine-codeine 6.25-10 mg/5 ml (PHENERGAN WITH CODEINE) 6.25-10 mg/5 mL syrup Take 5 mLs by mouth nightly as needed for Cough. 118 mL 1    [DISCONTINUED] clonazePAM (KLONOPIN) 0.5 MG tablet Take 0.5 mg by mouth 2 (two) times daily as needed.      [DISCONTINUED] clonazePAM (KLONOPIN) 1 MG tablet Take 1 tablet (1 mg total) by mouth 2 (two) times daily as needed for Anxiety. 30 tablet 0    [DISCONTINUED] clonazePAM (KLONOPIN) 1 MG  tablet Take 1 tablet by mouth twice daily as needed for anxiety 30 tablet 0     Facility-Administered Encounter Medications as of 2/23/2023   Medication Dose Route Frequency Provider Last Rate Last Admin    [COMPLETED] triamcinolone acetonide injection 40 mg  40 mg Intramuscular 1 time in Clinic/HOD Álvaro Sanchez MD   40 mg at 02/23/23 1136    [DISCONTINUED] GENERIC EXTERNAL MEDICATION     Generic External Data Provider        [DISCONTINUED] GENERIC EXTERNAL MEDICATION     Generic External Data Provider        [DISCONTINUED] GENERIC EXTERNAL MEDICATION     Generic External Data Provider         Orders Placed This Encounter   Procedures    OXYGEN FOR HOME USE     Needs battery operated oxygen concentrator - [employer] or  [employer] or [employer] Mini portable oxygen concentrators or similar device.  .[facility] for CPAP/Oxygen/Nebulizer supplies.  Customer Service: [phone]  Call: [phone]  Fax: [phone]  Billing Inquiries: [phone] or [phone]     Order Specific Question:   Liter Flow     Answer:   2     Order Specific Question:   Duration     Answer:   With activity     Order Specific Question:   Qualifying Test Performed at:     Answer:   Activity     Order Specific Question:   Oxygen saturation at rest     Answer:   92     Order Specific Question:   Oxygen saturation with activity     Answer:   88     Order Specific Question:   Oxygen saturation with activity on oxygen     Answer:   92     Comments:   2 liters     Order Specific Question:   Portable mode:     Answer:   pulse dose acceptable     Order Specific Question:   Mode:     Answer:   Portable concentrator     Order Specific Question:   Route     Answer:   nasal cannula     Order Specific Question:   Device:     Answer:   home concentrator with portable concentrator     Order Specific Question:   Length of need (in months):     Answer:   99 mos     Order Specific Question:   Patient condition with qualifying saturation      Answer:   COPD     Order Specific Question:   Height:     Answer:   5' (1.524 m)     Order Specific Question:   Weight:     Answer:   63.6 kg (140 lb 3.4 oz)     Order Specific Question:   Alternative treatment measures have been tried or considered and deemed clinically ineffective.     Answer:   Yes    Angiotensin Converting Enzyme     DX: Sarcoidosis 135     Standing Status:   Future     Number of Occurrences:   1     Standing Expiration Date:   4/23/2024    Sedimentation rate     Standing Status:   Future     Number of Occurrences:   1     Standing Expiration Date:   4/23/2024    WILLIAM Screen w/Reflex     SLE DX: 710.0     Standing Status:   Future     Number of Occurrences:   1     Standing Expiration Date:   4/23/2024    Rheumatoid Factor     Standing Status:   Future     Number of Occurrences:   1     Standing Expiration Date:   4/23/2024    Aspergillus fumagatus IgE     Standing Status:   Future     Number of Occurrences:   1     Standing Expiration Date:   4/23/2024    Bermuda grass IgE     Standing Status:   Future     Number of Occurrences:   1     Standing Expiration Date:   4/23/2024    Cat epithelium IgE     Standing Status:   Future     Number of Occurrences:   1     Standing Expiration Date:   4/23/2024    Cladosporium IgE     Standing Status:   Future     Number of Occurrences:   1     Standing Expiration Date:   4/23/2024    Cockroach, American IgE     Standing Status:   Future     Number of Occurrences:   1     Standing Expiration Date:   4/23/2024    [place], bald IgE     Standing Status:   Future     Number of Occurrences:   1     Standing Expiration Date:   4/23/2024    D. farinae IgE     Standing Status:   Future     Number of Occurrences:   1     Standing Expiration Date:   4/23/2024    D. pteronyssinus IgE     Standing Status:   Future     Number of Occurrences:   1     Standing Expiration Date:   4/23/2024    Dog dander IgE     Standing Status:   Future     Number of Occurrences:   1      Standing Expiration Date:   4/23/2024    Plantain, English IgE     Standing Status:   Future     Number of Occurrences:   1     Standing Expiration Date:   4/23/2024    Sterling grass IgE     Standing Status:   Future     Number of Occurrences:   1     Standing Expiration Date:   4/23/2024    Ray elder, rough IgE     Standing Status:   Future     Number of Occurrences:   1     Standing Expiration Date:   4/23/2024    Mugwort IgE     Standing Status:   Future     Number of Occurrences:   1     Standing Expiration Date:   4/23/2024    Nettle IgE     Standing Status:   Future     Number of Occurrences:   1     Standing Expiration Date:   4/23/2024    [place], white IgE     Standing Status:   Future     Number of Occurrences:   1     Standing Expiration Date:   4/23/2024    Penicillium IgE     Standing Status:   Future     Number of Occurrences:   1     Standing Expiration Date:   4/23/2024    Ragweed, short, common IgE     Standing Status:   Future     Number of Occurrences:   1     Standing Expiration Date:   4/23/2024    Albaro IgE     Standing Status:   Future     Number of Occurrences:   1     Standing Expiration Date:   4/23/2024    Allergen, Cocklebur     Standing Status:   Future     Number of Occurrences:   1     Standing Expiration Date:   4/23/2024    Allergen, Elm [place]     Standing Status:   Future     Number of Occurrences:   1     Standing Expiration Date:   4/23/2024    Allergen, Meadow Grass ([place])     Standing Status:   Future     Number of Occurrences:   1     Standing Expiration Date:   4/23/2024    Allergen, Mucor Racemosus     Standing Status:   Future     Number of Occurrences:   1     Standing Expiration Date:   4/23/2024    Allergen, Pecan [place] IgE     Standing Status:   Future     Number of Occurrences:   1     Standing Expiration Date:   4/23/2024    Allergen, White Ke     Standing Status:   Future     Number of Occurrences:   1     Standing Expiration Date:   4/23/2024     Allergen-Alternaria Alternata     Standing Status:   Future     Number of Occurrences:   1     Standing Expiration Date:   2024    Allergen-Fannin     Standing Status:   Future     Number of Occurrences:   1     Standing Expiration Date:   2024    Allergen-Common Pigweed     Standing Status:   Future     Number of Occurrences:   1     Standing Expiration Date:   2024    Allergen-Silver Birch     Standing Status:   Future     Number of Occurrences:   1     Standing Expiration Date:   2024    Feather Panel #2     Standing Status:   Future     Number of Occurrences:   1     Standing Expiration Date:   2024    RAST Allergen for [place]     Standing Status:   Future     Number of Occurrences:   1     Standing Expiration Date:   2024    RAST Allergen Maple ([place])     Standing Status:   Future     Number of Occurrences:   1     Standing Expiration Date:   2024    RAST Allergen [place]     Standing Status:   Future     Number of Occurrences:   1     Standing Expiration Date:   2024    RAST Allergen, [place]     Standing Status:   Future     Number of Occurrences:   1     Standing Expiration Date:   2024    RAST Allergen, Sheep [place])     Standing Status:   Future     Number of Occurrences:   1     Standing Expiration Date:   2024    Ambulatory referral/consult to Pulmonary Disease Management w/ Respiratory Therapist     Standing Status:   Future     Number of Occurrences:   1     Standing Expiration Date:   3/23/2024     Referral Priority:   Routine     Referral Type:   Consultation     Referral Reason:   Specialty Services Required     Requested Specialty:   Pulmonary Disease     Number of Visits Requested:   1    Fraction of  Nitric Oxide     Standing Status:   Future     Number of Occurrences:   1     Standing Expiration Date:   2024     Order Specific Question:   Release to patient     Answer:   Immediate    Complete PFT  with bronchodilator     Standing Status:   Future     Standing Expiration Date:   2024     Order Specific Question:   Release to patient     Answer:   Immediate    Six Minute Walk Test to qualify for Home Oxygen     Standing Status:   Future     Number of Occurrences:   1     Standing Expiration Date:   2024     Order Specific Question:   Release to patient     Answer:   Immediate     Plan:       Requested Prescriptions     Signed Prescriptions Disp Refills    predniSONE (DELTASONE) 20 MG tablet 20 tablet 0     Sig: Prednisone 60 mg/ day for 3 days, 40 mg/day for 3 days,20 mg/ day for 3 days, (1/2 tablet )10 mg a day for 3 days.    clonazePAM (KLONOPIN) 0.5 MG disintegrating tablet 120 tablet 1     Sig: Take 2 tablets (1 mg total) by mouth 2 (two) times daily as needed (anxiety).    azithromycin (ZITHROMAX Z-PHAM) 250 MG tablet 6 tablet 0     Si mg on day 1 (two tablets) followed by 250 mg once daily on days 2-5    fluticasone-salmeterol diskus inhaler 250-50 mcg 60 each 11     Sig: Inhale 1 puff into the lungs 2 (two) times daily. Controller. Wash out mouth after use    ipratropium (ATROVENT) 0.02 % nebulizer solution 300 mL 11     Sig: Take 2.5 mLs (500 mcg total) by nebulization every 6 (six) hours. Rescue    promethazine-codeine 6.25-10 mg/5 ml (PHENERGAN WITH CODEINE) 6.25-10 mg/5 mL syrup 118 mL 1     Sig: Take 5 mLs by mouth nightly as needed for Cough.     Exercise hypoxemia  -     Six Minute Walk Test to qualify for Home Oxygen; Future  -     OXYGEN FOR HOME USE    Chronic obstructive pulmonary disease with acute exacerbation  -     Ambulatory referral/consult to Pulmonology  -     predniSONE (DELTASONE) 20 MG tablet; Prednisone 60 mg/ day for 3 days, 40 mg/day for 3 days,20 mg/ day for 3 days, (1/2 tablet )10 mg a day for 3 days.  Dispense: 20 tablet; Refill: 0  -     triamcinolone acetonide injection 40 mg  -     Ambulatory referral/consult to Pulmonary Disease Management w/ Respiratory  Therapist; Future; Expected date: 2023  -     azithromycin (ZITHROMAX Z-PHAM) 250 MG tablet; 500 mg on day 1 (two tablets) followed by 250 mg once daily on days 2-5  Dispense: 6 tablet; Refill: 0  -     Angiotensin Converting Enzyme; Future; Expected date: 2023  -     Sedimentation rate; Future; Expected date: 2023  -     Fraction of  Nitric Oxide; Future  -     Complete PFT with bronchodilator; Future; Expected date: 2023  -     WILLIAM Screen w/Reflex; Future; Expected date: 2023  -     Rheumatoid Factor; Future; Expected date: 2023  -     fluticasone-salmeterol diskus inhaler 250-50 mcg; Inhale 1 puff into the lungs 2 (two) times daily. Controller. Wash out mouth after use  Dispense: 60 each; Refill: 11  -     ipratropium (ATROVENT) 0.02 % nebulizer solution; Take 2.5 mLs (500 mcg total) by nebulization every 6 (six) hours. Rescue  Dispense: 300 mL; Refill: 11    OTONIEL (generalized anxiety disorder)  -     clonazePAM (KLONOPIN) 0.5 MG disintegrating tablet; Take 2 tablets (1 mg total) by mouth 2 (two) times daily as needed (anxiety).  Dispense: 120 tablet; Refill: 1    Seasonal allergic rhinitis due to other allergic trigger  -     Aspergillus fumagatus IgE; Future; Expected date: 2023  -     [place] grass IgE; Future; Expected date: 2023  -     Cat epithelium IgE; Future; Expected date: 2023  -     Cladosporium IgE; Future; Expected date: 2023  -     Cockroach, American IgE; Future; Expected date: 2023  -     [place], bald IgE; Future; Expected date: 2023  -     D. farinae IgE; Future; Expected date: 2023  -     D. pteronyssinus IgE; Future; Expected date: 2023  -     Dog dander IgE; Future; Expected date: 2023  -     Plantain, English IgE; Future; Expected date: 2023  -     Sterling grass IgE; Future; Expected date: 2023  -     Ray elder, rough IgE; Future; Expected date: 2023  -     Mugwort IgE; Future;  Expected date: 02/23/2023  -     Nettle IgE; Future; Expected date: 02/23/2023  -     [place], white IgE; Future; Expected date: 02/23/2023  -     Penicillium IgE; Future; Expected date: 02/23/2023  -     Ragweed, short, common IgE; Future; Expected date: 02/23/2023  -     Albaro IgE; Future; Expected date: 02/23/2023  -     Allergen, Cocklebur; Future; Expected date: 02/23/2023  -     Allergen, Elm [place]; Future; Expected date: 02/23/2023  -     Allergen, [place] Grass ([place]); Future; Expected date: 02/23/2023  -     Allergen, Mucor Racemosus; Future; Expected date: 02/23/2023  -     Allergen, Pecan [place] IgE; Future; Expected date: 02/23/2023  -     Allergen, White Ke; Future; Expected date: 02/23/2023  -     Allergen-Alternaria Alternata; Future; Expected date: 02/23/2023  -     Allergen-[place]; Future; Expected date: 02/23/2023  -     Allergen-Common Pigweed; Future; Expected date: 02/23/2023  -     Allergen-Silver Birch; Future; Expected date: 02/23/2023  -     Feather Panel #2; Future; Expected date: 02/23/2023  -     RAST Allergen for [place]; Future; Expected date: 02/23/2023  -     RAST Allergen Maple ([place]); Future; Expected date: 02/23/2023  -     RAST Allergen Bear; Future; Expected date: 02/23/2023  -     RAST Allergen, Lamb's Quarters; Future; Expected date: 02/23/2023  -     RAST Allergen, Sheep Umapine([place]); Future; Expected date: 02/23/2023    Chronic cough  -     promethazine-codeine 6.25-10 mg/5 ml (PHENERGAN WITH CODEINE) 6.25-10 mg/5 mL syrup; Take 5 mLs by mouth nightly as needed for Cough.  Dispense: 118 mL; Refill: 1    Other emphysema  -     OXYGEN FOR HOME USE      Follow up in about 4 weeks (around 3/23/2023) for 6 min walk - ASAP, PFT on return.    MEDICAL DECISION MAKING: Moderate to high complexity.  Overall, the multiple problems listed are of moderate to high severity that may impact quality of life and activities of daily living. Side effects of  medications, treatment plan as well as options and alternatives reviewed and discussed with patient. There was counseling of patient concerning these issues.    Total time spent in counseling and coordination of care - 60  minutes of total time spent on the encounter, which includes face to face time and non-face to face time preparing to see the patient (eg, review of tests), Obtaining and/or reviewing separately obtained history, Documenting clinical information in the electronic or other health record, Independently interpreting results (not separately reported) and communicating results to the patient/family/caregiver, or Care coordination (not separately reported).    Time was used in discussion of prognosis, risks, benefits of treatment, instructions and compliance with regimen . Discussion with other physicians and/or health care providers - home health or for use of durable medical equipment (oxygen, nebulizers, CPAP, BiPAP) occurred.

## 2023-02-23 NOTE — PATIENT INSTRUCTIONS
Please read Warning before using.    USE ONLY AS DIRECTED, IF SYMPTOMS PERSIST SEE YOUR DOCTOR/HEALTHCARE PROFESSIONAL. ALWAYS READ THE LABEL. IMPORTANT: NeilMed® SINUS RINSE Mixture Packets should be used with NeilMed® 240 mL (8 fl oz) NASAFLO® to achieve the best results. You may use NeilMed® packets with other irrigation devices, as long as you mix with the correct volume of water. Our recommendation is to replace Neti Pot every three months.  Step 1  Please wash your hands and rinse the device. Fill the NASAFLO® with 240 mL (8 fl oz) of lukewarm distilled, filtered or previously boiled water. Please do not use tap or faucet water to dissolve the mixture unless it has been previously boiled and cooled down. You may warm the water in a microwave, but we recommend that you warm it in increments of 5 to 10 seconds to avoid overheating, damaging the device or scalding your nasal passage. Step 2  Cut the SINUS RINSE mixture packet at the corner and pour contents into the pot. Tighten the lid on the device securely. Place one finger over the hole of the cap and shake the device gently to dissolve the mixture. Step 3  Standing in front of a sink, bend forward to your comfort level and tilt your head to one side. Keeping your mouth open, and without holding your breath, apply the tip of the device snugly against your nasal passage and ALLOW THE SOLUTION TO GENTLY FLOW until the solution starts draining from the opposite nasal passage. Use roughly half the solution in the NASAFLO® (120 mL / 4 fl oz).  It should not enter your mouth unless you are tilting your head backwards. To adjust or stop the flow, you may place your finger over the hole of the cap, and depending on the seal, you may be able to control the flow. Step 4  Blow your nose very gently, without pinching nose completely to avoid pressure on eardrums. If tolerable, sniff in gently any residual solution remaining in the nasal passage once or twice, because  this may clean out the posterior nasopharyngeal area, which is the area at the back of your nasal passage. At times, some solution will reach the back of your throat, so please spit it out.  For NASAFLO® users, to help drain any residual solution, blow your nose gently while tilting your head forward and to the same side of the nasal passage you just rinsed. Step 5  Now repeat steps 3 & 4 on your other nasal passage.  If there is any solution left over, please discard it. We recommend you make a fresh solution each time you rinse. Rinse once or twice daily OR as directed by your physician. Saline is considered safe.  The [place] FDA is recommending that common cough and cold over the counter medicines not be given to babies and toddlers, and that antihistamines should not be given to children under age 6. NeilMed® NASAFLO®: Please clean with soap & water and let air dry Please read Warning before using.    Our recommendation is to replace Neti Pot every three months.          VIEO True HEPA Air Purifier with Allergen Remover - Black, [id number]    [employer]well Air Purifiers are the #1 brand recommended by allergists.  Captures up to 99.97% of microscopic allergens, 0.3 microns or larger from the air that passes through the filter.  Helps reduce odors, VOCs, and certain germs.  Activated carbon pre-filter helps reduce unpleasant odors.  Easy tap controls, with 4 air cleaning levels, including a Turbo Power Cleaning Level.  ENERGY STAR qualified.  2, 4, or 8 hour automatic shut-off timer.  Quiet operation.  Control panel light dimmer.  ]    [employer] Vital 100 True HEPA Air Purifier 119.99  Quick Purification: Breathe in fresh air where it matters most with the Vital 100. It purifies the air 3. 3 times per hour in a room as large as 300 sq. ft.    Reliable Coverage: The Vital 100 has a CADR of 130 CFM / 221 m³/h, which is perfect for living rooms, bedrooms, offices, nurseries, and shlomo.    Compact & Efficient: The Vital  100s compact size allows you to save space in your home, while its large air intake makes it effective at trapping pet hair and other intrusive particles.    Ozone Free: Use your Vital 100 with peace of mind. [employer]oit air purifiers are always 100% ozone free and never use UV-C light or anion purification methods that can cause harm to the user.    Ideal for Home: Washable preliminary filter traps large particles such as pet fur, hair, and lint. Its activated carbon filter absorbs unpleasant odors such as cooking smells or smoke.     LED Display: You can easily turn off the Vital 100s LED display to create a darker sleeping environment.    Quiet Environment: Create a clean yet peaceful environment undisturbed by your air purifier. The Vital 100 makes as little as 23dB of sound, which is quieter than a whisper.    Safe & Certified: CARB Certified, FCC Certified, and ETL Listed. Note: please remove the filters plastic packaging before using your air purifier.    [employer] air purifiers filter airborne particles 0.3 micrometers (µm) in size. Accountable makes no claims that this air purifier helps eliminate the COVID-19 virus.

## 2023-02-24 ENCOUNTER — TELEPHONE (OUTPATIENT)
Dept: PULMONOLOGY | Facility: CLINIC | Age: 63
End: 2023-02-24
Payer: MEDICARE

## 2023-02-24 LAB
ANA SER QL IF: NORMAL
ERYTHROCYTE [SEDIMENTATION RATE] IN BLOOD BY PHOTOMETRIC METHOD: 18 MM/HR (ref 0–36)

## 2023-02-24 NOTE — TELEPHONE ENCOUNTER
Chronic Disease Management:  Called patient to schedule initial Pulmonary Disease Management appointment.       Time spent on call    mins

## 2023-02-25 LAB — ACE SERPL-CCNC: 6 U/L (ref 16–85)

## 2023-02-27 LAB
AMER SYCAMORE IGE QN: <0.35 KU/L
FEATHER PANEL #2: <0.35 KU/L

## 2023-02-28 ENCOUNTER — CLINICAL SUPPORT (OUTPATIENT)
Dept: PULMONOLOGY | Facility: CLINIC | Age: 63
End: 2023-02-28
Payer: MEDICARE

## 2023-02-28 VITALS
HEIGHT: 60 IN | HEART RATE: 85 BPM | OXYGEN SATURATION: 93 % | RESPIRATION RATE: 16 BRPM | WEIGHT: 143.94 LBS | BODY MASS INDEX: 28.26 KG/M2

## 2023-02-28 DIAGNOSIS — J44.1 CHRONIC OBSTRUCTIVE PULMONARY DISEASE WITH ACUTE EXACERBATION: ICD-10-CM

## 2023-02-28 LAB
A ALTERNATA IGE QN: <0.1 KU/L
A FUMIGATUS IGE QN: <0.1 KU/L
ALLERGEN BOXELDER MAPLE TREE IGE: <0.1 KU/L
ALLERGEN MAPLE (BOX ELDER) CLASS: NORMAL
ALLERGEN MULBERRY CLASS: NORMAL
ALLERGEN MULBERRY TREE IGE: <0.1 KU/L
ALLERGEN PIGWEED IGE: <0.1 KU/L
ALLERGEN WHITE ASH TREE IGE: <0.1 KU/L
BALD CYPRESS IGE QN: <0.1 KU/L
BERMUDA GRASS IGE QN: <0.1 KU/L
C HERBARUM IGE QN: <0.1 KU/L
CAT DANDER IGE QN: <0.1 KU/L
CEDAR IGE QN: <0.1 KU/L
COCKLEBUR IGE QN: <0.1 KU/L
COMMON PIGWEED CLASS: NORMAL
COMMON RAGWEED IGE QN: <0.1 KU/L
D FARINAE IGE QN: <0.1 KU/L
D PTERONYSS IGE QN: <0.1 KU/L
DEPRECATED A ALTERNATA IGE RAST QL: NORMAL
DEPRECATED A FUMIGATUS IGE RAST QL: NORMAL
DEPRECATED BALD CYPRESS IGE RAST QL: NORMAL
DEPRECATED BERMUDA GRASS IGE RAST QL: NORMAL
DEPRECATED C HERBARUM IGE RAST QL: NORMAL
DEPRECATED CAT DANDER IGE RAST QL: NORMAL
DEPRECATED CEDAR IGE RAST QL: NORMAL
DEPRECATED COCKLEBUR IGE RAST QL: NORMAL
DEPRECATED COMMON RAGWEED IGE RAST QL: NORMAL
DEPRECATED D FARINAE IGE RAST QL: NORMAL
DEPRECATED D PTERONYSS IGE RAST QL: NORMAL
DEPRECATED DOG DANDER IGE RAST QL: NORMAL
DEPRECATED ELDER IGE RAST QL: NORMAL
DEPRECATED ENGL PLANTAIN IGE RAST QL: NORMAL
DEPRECATED GOOSEFOOT IGE RAST QL: NORMAL
DEPRECATED JOHNSON GRASS IGE RAST QL: NORMAL
DEPRECATED KENT BLUE GRASS IGE RAST QL: NORMAL
DEPRECATED M RACEMOSUS IGE RAST QL: NORMAL
DEPRECATED MUGWORT IGE RAST QL: NORMAL
DEPRECATED NETTLE IGE RAST QL: NORMAL
DEPRECATED P NOTATUM IGE RAST QL: NORMAL
DEPRECATED PECAN/HICK TREE IGE RAST QL: NORMAL
DEPRECATED ROACH IGE RAST QL: NORMAL
DEPRECATED SHEEP SORREL IGE RAST QL: NORMAL
DEPRECATED SILVER BIRCH IGE RAST QL: NORMAL
DEPRECATED TIMOTHY IGE RAST QL: NORMAL
DEPRECATED WHITE OAK IGE RAST QL: NORMAL
DOG DANDER IGE QN: <0.1 KU/L
ELDER IGE QN: <0.1 KU/L
ELM CEDAR CLASS: NORMAL
ELM CEDAR, IGE: <0.1 KU/L
ENGL PLANTAIN IGE QN: <0.1 KU/L
GOOSEFOOT IGE QN: <0.1 KU/L
JOHNSON GRASS IGE QN: <0.1 KU/L
KENT BLUE GRASS IGE QN: <0.1 KU/L
M RACEMOSUS IGE QN: <0.1 KU/L
MUGWORT IGE QN: <0.1 KU/L
NETTLE IGE QN: <0.1 KU/L
P NOTATUM IGE QN: <0.1 KU/L
PECAN/HICK TREE IGE QN: <0.1 KU/L
ROACH IGE QN: <0.1 KU/L
SHEEP SORREL IGE QN: <0.1 KU/L
SILVER BIRCH IGE QN: <0.1 KU/L
TIMOTHY IGE QN: <0.1 KU/L
WHITE ASH CLASS: NORMAL
WHITE OAK IGE QN: <0.1 KU/L

## 2023-02-28 PROCEDURE — 99999 PR PBB SHADOW E&M-EST. PATIENT-LVL III: CPT | Mod: PBBFAC,,,

## 2023-02-28 PROCEDURE — 99999 PR PBB SHADOW E&M-EST. PATIENT-LVL III: ICD-10-PCS | Mod: PBBFAC,,,

## 2023-02-28 NOTE — PROGRESS NOTES
Pulmonary Disease Management  [facility]  Initial Visit    Referring Provider: MD Daniel  Diagnosis: COPD  Last Hospital Admission: 2/2/23  Last provider visit: 2/23/23      Current Outpatient Medications:     albuterol (PROVENTIL) 2.5 mg /3 mL (0.083 %) nebulizer solution, Take by nebulization., Disp: , Rfl:     albuterol (PROVENTIL/VENTOLIN HFA) 90 mcg/actuation inhaler, Inhale 2 puffs into the lungs every 4 (four) hours as needed for Wheezing., Disp: 8.5 g, Rfl: 2    azithromycin (ZITHROMAX Z-PHAM) 250 MG tablet, 500 mg on day 1 (two tablets) followed by 250 mg once daily on days 2-5, Disp: 6 tablet, Rfl: 0    bimatoprost (LUMIGAN) 0.03 % ophthalmic drops, Apply 1 drop to eye., Disp: , Rfl:     brinzolamide-brimonidine (SIMBRINZA) 1-0.2 % DrpS, Apply 1 drop to eye., Disp: , Rfl:     carBAMazepine (TEGRETOL) 200 mg tablet, Take 200 mg by mouth 2 (two) times daily., Disp: , Rfl:     clonazePAM (KLONOPIN) 0.5 MG disintegrating tablet, Take 2 tablets (1 mg total) by mouth 2 (two) times daily as needed (anxiety)., Disp: 120 tablet, Rfl: 1    clonazePAM (KLONOPIN) 0.5 MG tablet, Take 1 tablet (0.5 mg total) by mouth 2 (two) times daily as needed for Anxiety., Disp: 60 tablet, Rfl: 1    clopidogreL (PLAVIX) 75 mg tablet, Take 1 tablet (75 mg total) by mouth once daily., Disp: 90 tablet, Rfl: 3    clotrimazole (MYCELEX) 10 mg olegario, Take 10 mg by mouth 5 (five) times daily., Disp: , Rfl:     ezetimibe (ZETIA) 10 mg tablet, Take 1 tablet (10 mg total) by mouth once daily., Disp: 90 tablet, Rfl: 3    fluticasone propionate (FLONASE) 50 mcg/actuation nasal spray, 2 sprays by Nasal route daily as needed., Disp: , Rfl:     fluticasone-salmeterol diskus inhaler 250-50 mcg, Inhale 1 puff into the lungs 2 (two) times daily. Controller. Wash out mouth after use, Disp: 60 each, Rfl: 11    ipratropium (ATROVENT) 0.02 % nebulizer solution, Take 2.5 mLs (500 mcg total) by nebulization every 6 (six) hours. Rescue, Disp: 300  mL, Rfl: 11    LINZESS 290 mcg Cap capsule, Take 1 capsule (290 mcg total) by mouth once daily., Disp: 90 capsule, Rfl: 3    lisinopriL-hydrochlorothiazide (PRINZIDE,ZESTORETIC) 20-25 mg Tab, Take 1 tablet by mouth once daily., Disp: 90 tablet, Rfl: 3    predniSONE (DELTASONE) 20 MG tablet, Prednisone 60 mg/ day for 3 days, 40 mg/day for 3 days,20 mg/ day for 3 days, (1/2 tablet )10 mg a day for 3 days., Disp: 20 tablet, Rfl: 0    promethazine-codeine 6.25-10 mg/5 ml (PHENERGAN WITH CODEINE) 6.25-10 mg/5 mL syrup, Take 5 mLs by mouth nightly as needed for Cough., Disp: 118 mL, Rfl: 1    promethazine-dextromethorphan (PROMETHAZINE-DM) 6.25-15 mg/5 mL Syrp, Take 5 mLs by mouth nightly as needed (Cough)., Disp: 180 mL, Rfl: 0    rosuvastatin (CRESTOR) 40 MG Tab, Take 1 tablet (40 mg total) by mouth once daily., Disp: 90 tablet, Rfl: 3    sertraline (ZOLOFT) 100 MG tablet, Take 1 tablet (100 mg total) by mouth once daily., Disp: 90 tablet, Rfl: 3    verapamiL (CALAN-SR) 120 MG CR tablet, Take 1 tablet (120 mg total) by mouth once daily., Disp: 90 tablet, Rfl: 3  No current facility-administered medications for this visit.    Review of patient's allergies indicates:   Allergen Reactions    Sulfa (sulfonamide antibiotics) Swelling    Dimenhydrinate Other (See Comments)    Sumatriptan     Latex Rash       Smoking history:   Social History     Tobacco Use   Smoking Status Former    Packs/day: 1.00    Years: 41.00    Pack years: 41.00    Types: Cigarettes    Start date: 1975    Quit date: 2023    Years since quittin.1   Smokeless Tobacco Never       Pulse: 85  SpO2: (!) 93 %  Resp: 16  Height: 5' (152.4 cm)  Weight: 65.3 kg (143 lb 15.4 oz)  BMI (kg/m2): 28.17  Current Oxygen Use: Yes  Device: Nasal Cannula  Liter Flow: 2  Oxygen Usage Duration: As needed  DME Provider: Documentation faxed to [employer]- patient has not received equipment yet   Does the patient use BiPAP, CPAP or Trilogy?: No    COPD  Questionnaire:  COPD Questionnaire  How often do you cough?: Some of the time  How often do you have phlegm (mucus) in your chest?: Some of the time  How often does your chest feel tight?: Some of the time  When you walk up a hill or one flight of stairs, how often are you breathless?: Most of the time  How often are you limited doing any activities at home?: Most of the time  How often are you confident leaving the house despite your lung condition?: A little of the time  How often do you sleep soundly?: Most of the time  How often do you have energy?: Some of the time  Total score: 23      Is this patient a candidate for pulmonary rehab?: No  Method Used for Education: Literature, Demonstration, Verbal  Did the patient demonstrate understanding?: Yes  What tests has the patient had done today?: Six Minute Walk        Educational assessment:   [x]            Good  []            Fair  []            Poor    Readiness to learn:   [x]            Good  []            Fair  []            Poor    Vision Status:   [x]            Good  []            Fair  []            Poor    Reading Ability:  []            Good  []            Fair  []            Poor    Knowledge of condition:   [x]            Good  []            Fair  []            Poor    Language Barriers:   []            Good  []            Fair  []            Poor  [x]            None    Cognitive/ Physical Barriers:   []            Good  []            Fair  []            Poor  [x]            None    Learning best by:                       [x]            Seeing  []            Hearing  []            Reading                         [x]            Doing    Describe any barrier /Limitation or financial implications of care choices identified     []            Financial  []            Emotional  []            Education  []            Vision/Hearing  []            Physical  [x]            None  []                TOPIC /CONTENT FOR TODAY:    [x]            MDI with or  without spacer  [x]            Dry powder inhaler  []            Acapella   []           Peak Flow meter  [x]            COPD action plan  [x]            Nebulizer use  [x]            Oxygen use safety  [x]            Breathing and cough techniques  [x]            Energy conservation  [x]            Infection prevention  []           OTHER________________________        Learner:    [x]            Patient   []            Caregiver    Method:    [x]            Verbal explanation  [x]            Audio visual    [x]            Literature  [x]            Teach back      Evaluation:    [x]            Teach back  [x]            Demonstrate  [x]            Follow up phone call    []            2 weeks     [x]            4 weeks   [x]            PRN    Patient Concerns:  Patient has not received supplemental oxygen.  - Documentation faxed to [employer]: [phone]      Therapist Comments:   Patient was seen today to review respiratory medication purpose and proper technique for use of inhalers. Patient practiced proper technique using MDI with valved holding chamber (spacer) and DPI inhalers. Discussed the difference between maintenance versus rescue medications. Reminded patient to rinse mouth thoroughly after ICS use. Patient demonstrated understanding. Literature was given to patient.    Educated patient on the importance of utilizing supplemental oxygen when prescribed. Reviewed strategies for maximizing energy. Patient verbalized understanding. Literature given to patient.      Patient recently quit smoking. Congratulated patient on this significant accomplishment. Provided information pertaining to the benefits of tobacco cessation. Understanding was verbalized.      Asthma trigger checklist was verbally reviewed and literature given to patient.     Infection prevention was discussed. Patient was reminded to get influenza vaccine. Hand hygiene and cleaning of respiratory equipment was also discussed. Patient verbalized  understanding.      COPD action plan was reviewed and literature was given to patient. Patient verbalized understanding.      Plan:  Monthly Pulmonary Disease Management Questionnaire  Follow-up PDM appointment scheduled for 3/27/23   Reinforce education  Meds: Advair, albuterol, ipratropium bromide   DME Needs: [facility] (documents faxed 2/28/23)  Action Plan: COPD  Immunization: Pneumococcal- current, Flu-current  Next Provider Visit: 3/27/23  Next Spirometry/CPFT: 3/27/23  Approximate time spent with patient: 80 mins

## 2023-02-28 NOTE — PATIENT INSTRUCTIONS
What is COPD?  COPD stands for chronic obstructive pulmonary disease. It means the airways in your lungs are blocked (obstructed). Because of this, it is hard to breathe. You may have trouble with daily activities because of shortness of breath. Over time the shortness of breath usually worsens making it more and more difficult to take care of yourself and take part in activities. Chronic bronchitis and emphysema are two common types of COPD.  What happens in chronic bronchitis?  The cells in the airways make more mucus than normal. The mucus builds up, narrowing the airways. This means less air travels into and out of the lungs. The lining of the airways may also become inflamed (swollen) and causes the airways to narrow even more.            What happens in emphysema?  The small airways are damaged and lose their stretchiness. The airways collapse when you exhale, causing air to get trapped in the air sacs. This means that less oxygen enters the blood vessels and less oxygen is delivered to all of the cells of your body. This makes it hard to breathe.         Damage to cilia  Cilia are small hairs that line and protect the airways. Smoking damages the cilia. Damaged cilia can't sweep mucus and particles away. Some of the cilia are destroyed. This damage worsens COPD.      How did I get COPD?  Most people get COPD from smoking. Cigarette smoke damages lungs, which can develop into COPD over many years.  How COPD affects you  COPD makes you work harder to breathe. Air may get trapped in the lungs, which prevents your lungs from filling completely with fresh oxygen-filled air when you inhale (breathe in). It's harder to take deep breaths especially when you are active and start breathing faster. Over time, your lungs may become enlarged filling the lung with air that does not transfer oxygen into the blood. These problems cause you to have shortness of breath (also called dyspnea). Wheezing (hoarse, whistling  breathing), chronic cough, and fatigue (feeling tired and worn out) are also common.   © 2686-8672 [employer]. [address] [place] [address], [place] [address]. All rights reserved. This information is not intended as a substitute for professional medical care. Always follow your healthcare professional's instructions.             Asthma Trigger Checklist  Allergens, irritants, and other things may trigger your asthma. Check the box next to each of your triggers. After each trigger is a list of ways to avoid it.   Dust mites. Dust mites live in mattresses, bedding, carpets, curtains, and indoor dust.  To kill dust mites, wash bedding in hot water (130°F) each week.  Cover mattress and pillows with special dust-mite-proof cases.  Don't use upholstered furniture like sofas or chairs in the bedroom.  Use allergy-proof filters for air conditioners and furnaces. Replace or clean them as instructed.  If you can, replace carpeting with wood or tile negra, especially in the bedroom.  Animals. Animals with fur or feathers shed dander (allergens).  It's best to choose a pet that doesn't have fur or feathers, such as a fish or a reptile.  If you have pets, keep them off your bed and out of your bedroom.  Wash your hands and clothes after handling pets.    Mold. Mold grows in damp places, such as bathrooms, basements, and closets.  Ask someone to clean damp areas in your home every week. Or try wearing a face mask while you clean.  Run an exhaust fan while bathing. Or leave a window open in the bathroom.  Repair water leaks in or around your home.  Have someone else cut grass or rake leaves, if possible.  Don't use vaporizers or humidifiers. They encourage mold growth.    Pollen. Pollen from trees, grasses, and weeds is a common allergen. (Flower pollens are generally not a problem).  Try to learn what types of pollen affect you most. Pollen levels vary depending on the plant, the season, and the time of day.  If  possible, use air conditioning instead of opening the windows in your home or car.  Have someone else do yard work, if possible.    Cockroaches. Roaches are found in many homes and produce allergens.  Keep your kitchen clean and dry. A leaky faucet or drain can attract roaches.  Remove garbage from your home daily.  Store food in tightly sealed containers. Wash dishes as soon as they are used.  Use bait stations or traps to control roaches. Avoid using chemical sprays.    Smoke. Smoke may be from cigarettes, cigars, pipes, incense or candles, barbecues or grills, and fireplaces.  Don't smoke. And don't let people smoke in your home or car.  When you travel, ask for nonsmoking rental cars and hotel rooms.  Avoid fireplaces and wood stoves. If you can't, sit away from them. Make sure the smoke is directed outside.  Don't burn incense or use candles.  Move away from smoky outdoor cooking grills.    Smog.  Smog is from car exhaust and other pollution.  Read or listen to local air-quality reports. These let you know when air quality is poor.  Stay indoors as much as you can on smoggy days. If possible, use air conditioning instead of opening the windows.  In your car, set air conditioning to recirculate air, so less pollution gets in.    Strong odors. These include air fresheners, deodorizers, and cleaning products; perfume, deodorant, and other beauty products; incense and candles; and insect sprays and other sprays.  Use scent-free products like deodorant or body lotion.  Avoid using cleaning products with bleach and ammonia. Make your own cleaning solution with white vinegar, baking soda, or mild dish soap.  Use exhaust fans while cooking. Or open a window, if possible.   Avoid perfumes, air fresheners, potpourri, and other scented products.          Other irritants. These include dust, aerosol sprays, and powders.  Wear a face mask while doing tasks like sanding, dusting, sweeping, and yard work. Open doors and  windows if working indoors.  Use pump spray bottles instead of aerosols.  Pour liquid  onto a rag or cloth instead of spraying them.    Weather. Weather conditions can trigger symptoms or make them worse.  Watch for very high or low temperatures, very humid conditions, or a lot of wind, as these conditions can make symptoms worse.  Limit outdoor activity during the type of weather that affects you.  Wear a scarf over your mouth and nose in cold weather.    Colds, flu, and sinus infections. Upper respiratory infections can trigger asthma.  Wash your hands often with soap and warm water or use a hand  containing alcohol.  Get a yearly flu shot. And ask if you should get a pneumonia vaccine.  Take care of your general health. Get plenty of sleep. And eat a variety of healthy foods.    Food additives. Food additives can trigger asthma flare-ups in some people.  Check food labels for sulfites or other similar ingredients. These are often found in foods such as wine, beer, and dried fruits.  Avoid foods that contain these additives.    Medicine. Aspirin, NSAIDS like ibuprofen and naproxen, and heart medicines like beta-blockers may be triggers.  Tell your health care provider if you think certain medicines trigger symptoms.   Be sure to read the labels on over-the-counter medicines. They may have ingredients that cause symptoms for you.   , Emotions. Laughing, crying, or feeling excited are triggers for some people.   To help you stay calm: Try breathing in slowly through your nose for a count of 2 seconds. Close your lips and breathe out for 4 seconds. Repeat.  Try to focus on a soothing image in your mind. This will help relax you and calm your breathing.  Remember to take your daily controller medicines. When you're upset or under stress, it's easy to forget.    Exercise. For some people, exercise can trigger symptoms. Don't let this keep you from being active.   If you have not been exercising  regularly, start slow and work up gradually.  Take all of your medicines as prescribed.  If you use quick-relief medicine, make sure you have it with you when you exercise.  Stop if you have any symptoms. Make sure you talk with your provider about these symptoms.  © 8083-7791 [employer]. [address] [place] [address]. All rights reserved. This information is not intended as a substitute for professional medical care. Always follow your healthcare professional's instructions.              Using an Inhaler with a Spacer  To control asthma, you need to use your medicines the right way. Some medicines are inhaled using a device called a metered-dose inhaler (MDI). Metered-dose inhalers deliver medicine with a fine spray. You may be asked to use a spacer (holding tube) with your inhaler. The spacer helps make sure all the medicine you need goes into your lungs.   Steps for using an inhaler with a spacer  Step 1:  Remove the caps from the inhaler and spacer.  Shake the inhaler well and attach the spacer. If the inhaler is being used for the first time or has not been used for a while, prime it as directed by the product maker.  Step 2:  Breathe out normally.  Put the spacer between your teeth. Close your lips tightly around it.  Keep your chin up.  Step 3:  Spray 1 puff into the spacer by pressing down on the inhaler.  Then breathe in through your mouth as slowly and deeply as you can. This should take about 5-10 seconds. If you breathe too quickly, you may hear a whistling sound in certain spacers.  Step 4:  Take the spacer out of your mouth.  Hold your breath for a count of 10.  Then hold your lips together and slowly breathe out through your mouth   Using Dry-Powder Inhalers (DPIs)  Some asthma medications are inhaled using inhalers. Dry-powder inhalers (DPIs) dispense measured doses of powdered medicine into your lungs. DPIs often have counters to track the number of doses used. Keep in  mind that DPIs don't all work the same way. So be sure you know how to use yours properly.  Using a DPI  Load the prescribed dose of medicine by following the instructions that came with the inhaler.  Breathe out normally, holding the inhaler away from your mouth. Hold your chin up.  Put the mouthpiece between your lips. Breathe in deeply through the inhaler--not through your nose. You may not feel or taste the medicine as you breathe in. This is normal.  Take the mouthpiece out of your mouth. Hold your breath for a count of 10, or as long as you can comfortably.  Breathe out slowly--but do not breathe out through the inhaler. Moisture from your breath can make the powder stick inside the inhaler.  Be sure to close the inhaler and store it in a dry place.  Rinse your mouth with water and spit it out, don't swallow it.  Do not wash your DPI with soap and water. To clean the mouthpiece, wipe with a dry cloth as needed.    © 0019-5647 [employer]. [address] [place] [address]. All rights reserved. This information is not intended as a substitute for professional medical care. Always follow your healthcare professional's instructions.            ACTION PLAN    GREEN ZONE  My sputum is clear/white/usual color and easily cleared.  My breathing is no harder than usual.  I can do my usual activities.  I can think clearly.   Take your usual medicines, including oxygen, as you are told to do so by your health care provider.   YELLOW ZONE  My sputum has change (color, thickness, amount).  I am more short of breath than usual.  I cough or wheeze more.  I weigh more and my legs/feet swell.  I cannot do my usual activities without resting.   Call your health care provider. You will probably be told to begin taking an antibiotic and prednisone. Have your pharmacy phone number available.   RED ZONE  I cannot cough out my sputum.  I am much more short of breath than normal.  I need to sit up to breathe  I  cannot do my usual activities.  I am unable to speak more than one or two words at a time.  I am confused.   Call your health care provider. You may be asked to come in to be seen, told to go to the emergency room, or told to call [id number].

## 2023-03-06 ENCOUNTER — PATIENT OUTREACH (OUTPATIENT)
Dept: PULMONOLOGY | Facility: CLINIC | Age: 63
End: 2023-03-06
Payer: MEDICARE

## 2023-03-06 NOTE — TELEPHONE ENCOUNTER
Called patient to follow up and confirm receipt of supplemental oxygen equipment. Patient reports she has not received equipment.  Contacted [facility] and spoke to representative who reports they do not accept insurance for POC's.    Confirmed [facility] is within the [employer] Medicare network.   Supplemental oxygen order and documentation faxed to [facility]. Will follow up.

## 2023-03-09 ENCOUNTER — TELEPHONE (OUTPATIENT)
Dept: PULMONOLOGY | Facility: CLINIC | Age: 63
End: 2023-03-09
Payer: MEDICARE

## 2023-03-09 NOTE — TELEPHONE ENCOUNTER
Chronic Disease Management:  Patient called to check the status of supplemental oxygen order.  Contacted [employer] for more information. Faxed [employer] POC form to [employer] @ [phone].    Will follow up.

## 2023-03-27 ENCOUNTER — CLINICAL SUPPORT (OUTPATIENT)
Dept: PULMONOLOGY | Facility: CLINIC | Age: 63
End: 2023-03-27
Payer: MEDICARE

## 2023-03-27 ENCOUNTER — OFFICE VISIT (OUTPATIENT)
Dept: PULMONOLOGY | Facility: CLINIC | Age: 63
End: 2023-03-27
Payer: MEDICARE

## 2023-03-27 VITALS — BODY MASS INDEX: 29.45 KG/M2 | RESPIRATION RATE: 16 BRPM | WEIGHT: 150.81 LBS

## 2023-03-27 VITALS
RESPIRATION RATE: 18 BRPM | SYSTOLIC BLOOD PRESSURE: 122 MMHG | DIASTOLIC BLOOD PRESSURE: 60 MMHG | OXYGEN SATURATION: 98 % | HEIGHT: 60 IN | HEART RATE: 81 BPM | BODY MASS INDEX: 29.61 KG/M2 | WEIGHT: 150.81 LBS

## 2023-03-27 DIAGNOSIS — J44.9 CHRONIC OBSTRUCTIVE PULMONARY DISEASE, UNSPECIFIED COPD TYPE: Primary | ICD-10-CM

## 2023-03-27 DIAGNOSIS — J44.1 CHRONIC OBSTRUCTIVE PULMONARY DISEASE WITH ACUTE EXACERBATION: ICD-10-CM

## 2023-03-27 DIAGNOSIS — R09.02 EXERCISE HYPOXEMIA: ICD-10-CM

## 2023-03-27 DIAGNOSIS — J02.9 SORE THROAT: ICD-10-CM

## 2023-03-27 DIAGNOSIS — J44.1 CHRONIC OBSTRUCTIVE PULMONARY DISEASE WITH ACUTE EXACERBATION: Primary | ICD-10-CM

## 2023-03-27 DIAGNOSIS — J44.1 COPD EXACERBATION: ICD-10-CM

## 2023-03-27 PROCEDURE — 3008F PR BODY MASS INDEX (BMI) DOCUMENTED: ICD-10-PCS | Mod: CPTII,S$GLB,, | Performed by: INTERNAL MEDICINE

## 2023-03-27 PROCEDURE — 3078F PR MOST RECENT DIASTOLIC BLOOD PRESSURE < 80 MM HG: ICD-10-PCS | Mod: CPTII,S$GLB,, | Performed by: INTERNAL MEDICINE

## 2023-03-27 PROCEDURE — 3074F SYST BP LT 130 MM HG: CPT | Mod: CPTII,S$GLB,, | Performed by: INTERNAL MEDICINE

## 2023-03-27 PROCEDURE — 99999 PR PBB SHADOW E&M-EST. PATIENT-LVL III: ICD-10-PCS | Mod: PBBFAC,,,

## 2023-03-27 PROCEDURE — 3074F PR MOST RECENT SYSTOLIC BLOOD PRESSURE < 130 MM HG: ICD-10-PCS | Mod: CPTII,S$GLB,, | Performed by: INTERNAL MEDICINE

## 2023-03-27 PROCEDURE — 99999 PR PBB SHADOW E&M-EST. PATIENT-LVL V: CPT | Mod: PBBFAC,,, | Performed by: INTERNAL MEDICINE

## 2023-03-27 PROCEDURE — 99999 PR PBB SHADOW E&M-EST. PATIENT-LVL III: CPT | Mod: PBBFAC,,,

## 2023-03-27 PROCEDURE — 94726 PLETHYSMOGRAPHY LUNG VOLUMES: CPT | Mod: S$GLB,,, | Performed by: INTERNAL MEDICINE

## 2023-03-27 PROCEDURE — 99999 PR PBB SHADOW E&M-EST. PATIENT-LVL I: CPT | Mod: PBBFAC,,,

## 2023-03-27 PROCEDURE — 4010F ACE/ARB THERAPY RXD/TAKEN: CPT | Mod: CPTII,S$GLB,, | Performed by: INTERNAL MEDICINE

## 2023-03-27 PROCEDURE — 1160F RVW MEDS BY RX/DR IN RCRD: CPT | Mod: CPTII,S$GLB,, | Performed by: INTERNAL MEDICINE

## 2023-03-27 PROCEDURE — 99999 PR PBB SHADOW E&M-EST. PATIENT-LVL V: ICD-10-PCS | Mod: PBBFAC,,, | Performed by: INTERNAL MEDICINE

## 2023-03-27 PROCEDURE — 94726 PULM FUNCT TST PLETHYSMOGRAP: ICD-10-PCS | Mod: S$GLB,,, | Performed by: INTERNAL MEDICINE

## 2023-03-27 PROCEDURE — 1159F PR MEDICATION LIST DOCUMENTED IN MEDICAL RECORD: ICD-10-PCS | Mod: CPTII,S$GLB,, | Performed by: INTERNAL MEDICINE

## 2023-03-27 PROCEDURE — 1159F MED LIST DOCD IN RCRD: CPT | Mod: CPTII,S$GLB,, | Performed by: INTERNAL MEDICINE

## 2023-03-27 PROCEDURE — 1160F PR REVIEW ALL MEDS BY PRESCRIBER/CLIN PHARMACIST DOCUMENTED: ICD-10-PCS | Mod: CPTII,S$GLB,, | Performed by: INTERNAL MEDICINE

## 2023-03-27 PROCEDURE — 3078F DIAST BP <80 MM HG: CPT | Mod: CPTII,S$GLB,, | Performed by: INTERNAL MEDICINE

## 2023-03-27 PROCEDURE — 4010F PR ACE/ARB THEARPY RXD/TAKEN: ICD-10-PCS | Mod: CPTII,S$GLB,, | Performed by: INTERNAL MEDICINE

## 2023-03-27 PROCEDURE — 94729 PR C02/MEMBANE DIFFUSE CAPACITY: ICD-10-PCS | Mod: S$GLB,,, | Performed by: INTERNAL MEDICINE

## 2023-03-27 PROCEDURE — 99215 PR OFFICE/OUTPT VISIT, EST, LEVL V, 40-54 MIN: ICD-10-PCS | Mod: 25,S$GLB,, | Performed by: INTERNAL MEDICINE

## 2023-03-27 PROCEDURE — 99999 PR PBB SHADOW E&M-EST. PATIENT-LVL I: ICD-10-PCS | Mod: PBBFAC,,,

## 2023-03-27 PROCEDURE — 3008F BODY MASS INDEX DOCD: CPT | Mod: CPTII,S$GLB,, | Performed by: INTERNAL MEDICINE

## 2023-03-27 PROCEDURE — 94060 EVALUATION OF WHEEZING: CPT | Mod: S$GLB,,, | Performed by: INTERNAL MEDICINE

## 2023-03-27 PROCEDURE — 99215 OFFICE O/P EST HI 40 MIN: CPT | Mod: 25,S$GLB,, | Performed by: INTERNAL MEDICINE

## 2023-03-27 PROCEDURE — 94060 PR EVAL OF BRONCHOSPASM: ICD-10-PCS | Mod: S$GLB,,, | Performed by: INTERNAL MEDICINE

## 2023-03-27 PROCEDURE — 94729 DIFFUSING CAPACITY: CPT | Mod: S$GLB,,, | Performed by: INTERNAL MEDICINE

## 2023-03-27 RX ORDER — LINACLOTIDE 290 UG/1
290 CAPSULE, GELATIN COATED ORAL
COMMUNITY
Start: 2023-03-20 | End: 2024-01-17 | Stop reason: SDUPTHER

## 2023-03-27 RX ORDER — MONTELUKAST SODIUM 10 MG/1
10 TABLET ORAL NIGHTLY
Qty: 30 TABLET | Refills: 11 | Status: SHIPPED | OUTPATIENT
Start: 2023-03-27 | End: 2023-04-26

## 2023-03-27 NOTE — PROGRESS NOTES
Subjective:       Patient ID: Kenya Majano is a 62 y.o. female.    Chief Complaint: Follow up for Chronic Obstructive Pulmonary Disease     Feels like throat is sore , raw    HPI COPD  She presents for evaluation and treatment of COPD. The patient is currently having symptoms / an exacerbation. Current symptoms include chronic dyspnea, dyspnea after 1/2 blocks, non-productive cough, and wheezing. Symptoms have been present since several months ago and have been gradually worsening. She denies weight loss, chills, and fever. Associated symptoms include fatigue, poor exercise tolerance, and shortness of breath.  This episode appears to have been triggered by no identifiable factor. Treatments tried for the current exacerbation: albuterol nebulizer and inhaled steroid. The patient has been having similar episodes for approximately 2 months. She uses 1 pillows at night. Patient currently is not on home oxygen therapy.. The patient is having no constitutional symptoms, denying fever, chills, anorexia, or weight loss. The patient has been hospitalized for this condition before. She has a history of 41 pack years. The patient is experiencing exercise intolerance (difficulty walking 1 blocks on flat ground).  Hx of asthma  Cough in 2022 started - now worse  Stopped smoking in 2023    Jet nebsd every 4 hours  Follow up from hospitalization  2023 Hospitalized  2023 Hospitalized   Now symptoms are recurring  Past Medical History:   Diagnosis Date    Anxiety     Arthritis     Asthma     Cataract     Coronary artery disease     Emphysema of lung     Glaucoma     Hypertension     Seizures     Stroke      Past Surgical History:   Procedure Laterality Date    APPENDECTOMY       SECTION      CHOLECYSTECTOMY      EYE SURGERY      HYSTERECTOMY      around     OOPHORECTOMY      around      Social History     Socioeconomic History    Marital status:     Number of children: 3   Tobacco Use     Smoking status: Former     Packs/day: 1.00     Years: 41.00     Pack years: 41.00     Types: Cigarettes     Start date: 1975     Quit date: 2023     Years since quittin.2    Smokeless tobacco: Never   Substance and Sexual Activity    Alcohol use: Never    Drug use: Never    Sexual activity: Yes     Partners: Male     @FAM@  Review of Systems   Constitutional:  Positive for fatigue. Negative for fever.   HENT:  Positive for postnasal drip, rhinorrhea and congestion.    Eyes:  Negative for redness and itching.   Respiratory:  Positive for cough, sputum production, shortness of breath, dyspnea on extertion, use of rescue inhaler and Paroxysmal Nocturnal Dyspnea.    Cardiovascular:  Negative for chest pain, palpitations and leg swelling.   Genitourinary:  Negative for difficulty urinating and hematuria.   Endocrine:  Negative for cold intolerance and heat intolerance.    Skin:  Negative for rash.   Gastrointestinal:  Negative for nausea and abdominal pain.   Neurological:  Negative for dizziness, syncope, weakness and light-headedness.   Hematological:  Negative for adenopathy. Does not bruise/bleed easily.   Psychiatric/Behavioral:  Negative for sleep disturbance. The patient is not nervous/anxious.      Objective:      /60   Pulse 81   Resp 18   Ht 5' (1.524 m)   Wt 68.4 kg (150 lb 12.7 oz)   LMP  (LMP Unknown)   SpO2 98%   BMI 29.45 kg/m²   Physical Exam  Vitals and nursing note reviewed.   Constitutional:       Appearance: She is well-developed.   HENT:      Head: Normocephalic and atraumatic.      Nose: Congestion and rhinorrhea present.      Mouth/Throat:      Pharynx: No oropharyngeal exudate.   Eyes:      Conjunctiva/sclera: Conjunctivae normal.      Pupils: Pupils are equal, round, and reactive to light.   Neck:      Thyroid: No thyromegaly.      Vascular: No JVD.      Trachea: No tracheal deviation.   Cardiovascular:      Rate and Rhythm: Normal rate and regular rhythm.      Heart  sounds: Normal heart sounds.   Pulmonary:      Effort: Pulmonary effort is normal. No respiratory distress.      Breath sounds: Examination of the right-lower field reveals wheezing. Examination of the left-lower field reveals wheezing. Decreased breath sounds and wheezing present. No rhonchi or rales.   Chest:      Chest wall: No tenderness.   Abdominal:      General: Bowel sounds are normal.      Palpations: Abdomen is soft.   Musculoskeletal:         General: Normal range of motion.      Cervical back: Neck supple.   Lymphadenopathy:      Cervical: No cervical adenopathy.   Skin:     General: Skin is warm and dry.   Neurological:      Mental Status: She is alert and oriented to person, place, and time.     Personal Diagnostic Review  Chest X-Ray: I personally reviewed the films and findings are:, air trapping/emphysema  Mammo Digital Screening Bilat w/ Shahid  Narrative: Result:   Mammo Digital Screening Bilat w/ Shahid     History:  Patient is 62 y.o. and is seen for a screening mammogram.    Films Compared:  Prior images (if available) were compared.     Findings:  This procedure was performed using tomosynthesis. Computer-aided detection   was utilized in the interpretation of this examination.  The breasts have scattered areas of fibroglandular density. There is no   evidence of suspicious masses, calcifications, or other abnormal findings.  Impression: Bilateral  There is no mammographic evidence of malignancy.    BI-RADS Category:   Overall: 2 - Benign       Recommendation:  Routine screening mammogram in 1 year is recommended.    Your estimated lifetime risk of breast cancer (to age 85) based on   Tyrer-Cuzick risk assessment model is Tyrer-Cuzick: 4.83 %. According to   the American Cancer Society, patients with a lifetime breast cancer risk   of 20% or higher might benefit from supplemental screening tests.    Pulmonary function tests:  Spirometry shows a reduced vital capacity suggesting restriction.  Spirometry remains unimproved following bronchodilator. Lung volumes show mild restriction is present. Airway mechanics are abnormal showing increased airway resistance and decreased   conductance. DLCO is moderately decreased. MVV is moderately decreased.   Â    Pulmonary Studies Review 3/27/2023 3/27/2023 2/28/2023 2/23/2023 2/23/2023 2/7/2023 12/12/2022   SpO2 98 - 93 - 97 95 -   Ordering Provider - - - Álvaro Sanchez MD - - -   Interpreting Provider - - - Álvaro Sanchez MD - - -   Performing nurse/tech/RT - - - VT, RT - - -   Diagnosis - - - (No Data) - - -   Height 60 - 60 60 60 60 60   Weight 2412.71 2412.71 2303.37 2243.4 2243.4 2282.2 2186.96   BMI (Calculated) 29.5 - 28.1 27.4 27.4 27.9 26.7   Predicted Distance 326.63 510.71 335.37 339.73 339.73 336.61 344.1   Patient Race - - -  - - -   6MWT Status - - - completed without stopping - - -   Patient Reported - - - Dyspnea - - -   Was O2 used? - - - Yes - - -   Delivery Method - - - Cannula;Pull Tank - - -   6MW Distance walked (feet) - - - 1200 - - -   Distance walked (meters) - - - 365.76 - - -   Did patient stop? - - - No - - -   Type of assistive device(s) used? - - - no assistive devices - - -   Is extra documentation required for this patient? - - - Yes - - -   Oxygen Saturation - - - 92 - - -   Supplemental Oxygen - - - Room Air - - -   Heart Rate - - - 118 - - -   Blood Pressure - - - 97/63 - - -   Mookie Dyspnea Rating  - - - nothing at all - - -   Oxygen Saturation - - - 94 - - -   Supplemental Oxygen - - - 2 L/M - - -   Heart Rate - - - 138 - - -   Blood Pressure - - - 170/78 - - -   Mookie Dyspnea Rating  - - - very,very heavy - - -   Recovery Time (seconds) - - - 240 - - -   Oxygen Saturation - - - 96 - - -   Supplemental Oxygen - - - 2 L/M - - -   Heart Rate - - - 110 - - -   Blood Pressure - - - 168/69 - - -   Mookie Dyspnea Rating  - - - heavy - - -   Is procedure ready for interpretation? - - - Yes - - -   Did the patient stop or pause?  - - - No - - -   Total Time Walked (Calculated) - - - 360 - - -   Total Laps Walked - - - 6 - - -   Final Partial Lap Distance (feet) - - - 0 - - -   Total Distance Feet (Calculated) - - - 1200 - - -   Total Distance Meters (Calculated) - - - 365.76 - - -   Predicted Distance Meters (Calculated) 467.79 - 474.89 478.78 478.78 476.26 482.44   Percentage of Predicted (Calculated) - - - 76.39 - - -   Peak VO2 (Calculated) - - - 14.95 - - -   Mets - - - 4.27 - - -   Has The Patient Had a Previous Six Minute Walk Test? - - - No - - -   Oxygen Qualification? - - - No - - -     No flowsheet data found.      Assessment:       1. Chronic obstructive pulmonary disease, unspecified COPD type    2. Exercise hypoxemia    3. Sore throat    4. COPD exacerbation          COPD exacerbation  Jet nebs  inhaled corticosteroid and long acting B2- agonist     Outpatient Encounter Medications as of 3/27/2023   Medication Sig Dispense Refill    albuterol (PROVENTIL) 2.5 mg /3 mL (0.083 %) nebulizer solution Take by nebulization.      albuterol (PROVENTIL/VENTOLIN HFA) 90 mcg/actuation inhaler Inhale 2 puffs into the lungs every 4 (four) hours as needed for Wheezing. 8.5 g 2    bimatoprost (LUMIGAN) 0.03 % ophthalmic drops Apply 1 drop to eye.      brinzolamide-brimonidine (SIMBRINZA) 1-0.2 % DrpS Apply 1 drop to eye.      carBAMazepine (TEGRETOL) 200 mg tablet Take 200 mg by mouth 2 (two) times daily.      clonazePAM (KLONOPIN) 0.5 MG disintegrating tablet Take 2 tablets (1 mg total) by mouth 2 (two) times daily as needed (anxiety). 120 tablet 1    clopidogreL (PLAVIX) 75 mg tablet Take 1 tablet (75 mg total) by mouth once daily. 90 tablet 3    clotrimazole (MYCELEX) 10 mg olegario Take 10 mg by mouth 5 (five) times daily.      ezetimibe (ZETIA) 10 mg tablet Take 1 tablet (10 mg total) by mouth once daily. 90 tablet 3    fluticasone propionate (FLONASE) 50 mcg/actuation nasal spray 2 sprays by Nasal route daily as needed.       fluticasone-salmeterol diskus inhaler 250-50 mcg Inhale 1 puff into the lungs 2 (two) times daily. Controller. Wash out mouth after use 60 each 11    ipratropium (ATROVENT) 0.02 % nebulizer solution Take 2.5 mLs (500 mcg total) by nebulization every 6 (six) hours. Rescue 300 mL 11    LINZESS 290 mcg Cap capsule Take 290 mcg by mouth.      lisinopriL-hydrochlorothiazide (PRINZIDE,ZESTORETIC) 20-25 mg Tab Take 1 tablet by mouth once daily. 90 tablet 3    rosuvastatin (CRESTOR) 40 MG Tab Take 1 tablet (40 mg total) by mouth once daily. 90 tablet 3    sertraline (ZOLOFT) 100 MG tablet Take 1 tablet (100 mg total) by mouth once daily. 90 tablet 3    [DISCONTINUED] predniSONE (DELTASONE) 20 MG tablet Prednisone 60 mg/ day for 3 days, 40 mg/day for 3 days,20 mg/ day for 3 days, (1/2 tablet )10 mg a day for 3 days. 20 tablet 0    clonazePAM (KLONOPIN) 0.5 MG tablet Take 1 tablet (0.5 mg total) by mouth 2 (two) times daily as needed for Anxiety. 60 tablet 1    [] LINZESS 290 mcg Cap capsule Take 1 capsule (290 mcg total) by mouth once daily. 90 capsule 3    montelukast (SINGULAIR) 10 mg tablet Take 1 tablet (10 mg total) by mouth every evening. 30 tablet 11    [DISCONTINUED] azithromycin (ZITHROMAX Z-PHAM) 250 MG tablet 500 mg on day 1 (two tablets) followed by 250 mg once daily on days 2-5 6 tablet 0    [DISCONTINUED] promethazine-codeine 6.25-10 mg/5 ml (PHENERGAN WITH CODEINE) 6.25-10 mg/5 mL syrup Take 5 mLs by mouth nightly as needed for Cough. (Patient not taking: Reported on 3/27/2023) 118 mL 1    [DISCONTINUED] promethazine-dextromethorphan (PROMETHAZINE-DM) 6.25-15 mg/5 mL Syrp Take 5 mLs by mouth nightly as needed (Cough). (Patient not taking: Reported on 3/27/2023) 180 mL 0    [DISCONTINUED] verapamiL (CALAN-SR) 120 MG CR tablet Take 1 tablet (120 mg total) by mouth once daily. (Patient not taking: Reported on 3/27/2023) 90 tablet 3     No facility-administered encounter medications on file as of 3/27/2023.      Orders Placed This Encounter   Procedures    Ambulatory referral/consult to ENT     Standing Status:   Future     Standing Expiration Date:   4/27/2024     Referral Priority:   Routine     Referral Type:   Consultation     Referral Reason:   Specialty Services Required     Requested Specialty:   Otolaryngology     Number of Visits Requested:   1    Spirometry with/without bronchodilator     Standing Status:   Future     Standing Expiration Date:   3/26/2024     Order Specific Question:   Release to patient     Answer:   Immediate    Six Minute Walk Test to qualify for Home Oxygen     Standing Status:   Future     Standing Expiration Date:   3/27/2024     Order Specific Question:   Release to patient     Answer:   Immediate     Plan:       Requested Prescriptions     Signed Prescriptions Disp Refills    montelukast (SINGULAIR) 10 mg tablet 30 tablet 11     Sig: Take 1 tablet (10 mg total) by mouth every evening.     Chronic obstructive pulmonary disease, unspecified COPD type  -     Spirometry with/without bronchodilator; Future; Expected date: 09/27/2023  -     montelukast (SINGULAIR) 10 mg tablet; Take 1 tablet (10 mg total) by mouth every evening.  Dispense: 30 tablet; Refill: 11  -     Six Minute Walk Test to qualify for Home Oxygen; Future    Exercise hypoxemia  -     Six Minute Walk Test to qualify for Home Oxygen; Future    Sore throat  -     Ambulatory referral/consult to ENT; Future; Expected date: 04/03/2023    COPD exacerbation      Follow up in about 6 months (around 9/27/2023) for 6 min walk - on return.    MEDICAL DECISION MAKING: Moderate to high complexity.  Overall, the multiple problems listed are of moderate to high severity that may impact quality of life and activities of daily living. Side effects of medications, treatment plan as well as options and alternatives reviewed and discussed with patient. There was counseling of patient concerning these issues.    Total time spent in counseling  and coordination of care - 60  minutes of total time spent on the encounter, which includes face to face time and non-face to face time preparing to see the patient (eg, review of tests), Obtaining and/or reviewing separately obtained history, Documenting clinical information in the electronic or other health record, Independently interpreting results (not separately reported) and communicating results to the patient/family/caregiver, or Care coordination (not separately reported).    Time was used in discussion of prognosis, risks, benefits of treatment, instructions and compliance with regimen . Discussion with other physicians and/or health care providers - home health or for use of durable medical equipment (oxygen, nebulizers, CPAP, BiPAP) occurred.

## 2023-03-27 NOTE — PROGRESS NOTES
Pulmonary Disease Management  [facility]  Follow up Visit  Chronic Care Management    Kenya Majano  was seen 3/27/2023  2:30 PM in the Pul[facility] Management [facility] for evaluation, education, reinforcement of self management techniques and exacerbation action plan.    Michael Blair    Past Medical History:   Diagnosis Date    Anxiety     Arthritis     Asthma     Cataract     Coronary artery disease     Emphysema of lung     Glaucoma     Hypertension     Seizures     Stroke        Patient's Medications   New Prescriptions    No medications on file   Previous Medications    ALBUTEROL (PROVENTIL) 2.5 MG /3 ML (0.083 %) NEBULIZER SOLUTION    Take by nebulization.    ALBUTEROL (PROVENTIL/VENTOLIN HFA) 90 MCG/ACTUATION INHALER    Inhale 2 puffs into the lungs every 4 (four) hours as needed for Wheezing.    AZITHROMYCIN (ZITHROMAX Z-PHAM) 250 MG TABLET    500 mg on day 1 (two tablets) followed by 250 mg once daily on days 2-5    BIMATOPROST (LUMIGAN) 0.03 % OPHTHALMIC DROPS    Apply 1 drop to eye.    BRINZOLAMIDE-BRIMONIDINE (SIMBRINZA) 1-0.2 % DRPS    Apply 1 drop to eye.    CARBAMAZEPINE (TEGRETOL) 200 MG TABLET    Take 200 mg by mouth 2 (two) times daily.    CLONAZEPAM (KLONOPIN) 0.5 MG DISINTEGRATING TABLET    Take 2 tablets (1 mg total) by mouth 2 (two) times daily as needed (anxiety).    CLOPIDOGREL (PLAVIX) 75 MG TABLET    Take 1 tablet (75 mg total) by mouth once daily.    CLOTRIMAZOLE (MYCELEX) 10 MG TYSON    Take 10 mg by mouth 5 (five) times daily.    EZETIMIBE (ZETIA) 10 MG TABLET    Take 1 tablet (10 mg total) by mouth once daily.    FLUTICASONE PROPIONATE (FLONASE) 50 MCG/ACTUATION NASAL SPRAY    2 sprays by Nasal route daily as needed.    FLUTICASONE-SALMETEROL DISKUS INHALER 250-50 MCG    Inhale 1 puff into the lungs 2 (two) times daily. Controller. Wash out mouth after use    IPRATROPIUM (ATROVENT) 0.02 % NEBULIZER SOLUTION    Take 2.5 mLs (500 mcg total) by nebulization every 6  (six) hours. Rescue    LISINOPRIL-HYDROCHLOROTHIAZIDE (PRINZIDE,ZESTORETIC) 20-25 MG TAB    Take 1 tablet by mouth once daily.    PREDNISONE (DELTASONE) 20 MG TABLET    Prednisone 60 mg/ day for 3 days, 40 mg/day for 3 days,20 mg/ day for 3 days, (1/2 tablet )10 mg a day for 3 days.    PROMETHAZINE-CODEINE 6.25-10 MG/5 ML (PHENERGAN WITH CODEINE) 6.25-10 MG/5 ML SYRUP    Take 5 mLs by mouth nightly as needed for Cough.    PROMETHAZINE-DEXTROMETHORPHAN (PROMETHAZINE-DM) 6.25-15 MG/5 ML SYRP    Take 5 mLs by mouth nightly as needed (Cough).    ROSUVASTATIN (CRESTOR) 40 MG TAB    Take 1 tablet (40 mg total) by mouth once daily.    SERTRALINE (ZOLOFT) 100 MG TABLET    Take 1 tablet (100 mg total) by mouth once daily.    VERAPAMIL (CALAN-SR) 120 MG CR TABLET    Take 1 tablet (120 mg total) by mouth once daily.   Modified Medications    No medications on file   Discontinued Medications    No medications on file             Educational assessment:   [x]            Good  []            Fair  []            Poor    Readiness to learn:   [x]            Good  []            Fair  []            Poor    Vision Status:   [x]            Good  []            Fair  []            Poor    Reading Ability:  [x]            Good  []            Fair  []            Poor    Knowledge of condition:   [x]            Good  []            Fair  []            Poor    Language Barriers:   []            Good  []            Fair  []            Poor  [x]            None    Cognitive/ Physical Barriers:   []            Good  []            Fair  []            Poor  [x]            None    Learning best by:                       [x]            Seeing  []            Hearing  []            Reading                         [x]            Doing    Describe any barrier /Limitation or financial implications of care choices identified     []            Financial  []            Emotional  []            Education  []            Vision/Hearing  []             Physical  [x]            None  []                TOPIC /CONTENT FOR TODAY:    [x]            MDI with or without spacer  [x]            Dry powder inhaler  []            Acapella   []           Peak Flow meter  [x]            COPD action plan  [x]            Nebulizer use  [x]            Oxygen use safety  [x]            Breathing and cough techniques  [x]            Energy conservation  [x]            Infection prevention  []           OTHER________________________        Learner:    [x]            Patient   []            Caregiver    Method:    [x]            Verbal explanation  [x]            Audio visual    [x]            Literature  [x]            Teach back      Evaluation:    [x]            Teach back  [x]            Demonstrate  [x]            Follow up phone call    []            2 weeks     [x]            4 weeks   [x]            PRN    Additional comments:   Patient was seen today to review respiratory medication purpose and proper technique for use of inhalers. Patient practiced proper technique using MDI with valved holding chamber (spacer) and DPI inhalers. Patient demonstrated understanding. Literature was given to patient.     Reviewed breathing techniques such as pursed-lip breathing, quezada-cough technique, and diaphragmatic breathing. Patient practiced proper technique and verbalized understanding. Literature given to patient.      Educated patient on the importance of utilizing supplemental oxygen when prescribed. Reviewed strategies for maximizing energy. Patient verbalized understanding. Literature given to patient.      Asthma trigger checklist was verbally reviewed and literature given to patient.     Infection prevention was discussed. Patient was reminded to get influenza vaccine. Hand hygiene and cleaning of respiratory equipment was also discussed. Patient verbalized understanding.      COPD action plan was reviewed and literature was given to patient. Patient verbalized understanding.           Plan:  Monthly Pulmonary Disease Management Questionnaire  Follow-up PDM appointment scheduled for 7/6/23   Reinforce education  Meds: Advair, albuterol   DME Needs: [facility]   Action Plan: COPD  Immunization: Pneumococcal- DUE, Flu-DUE, Covid 19-DUE  Next Provider Visit: Not scheduled   Next Spirometry/CPFT: Not scheduled   Approximate time spent with patient: 30 mins

## 2023-03-27 NOTE — PATIENT INSTRUCTIONS
What is COPD?  COPD stands for chronic obstructive pulmonary disease. It means the airways in your lungs are blocked (obstructed). Because of this, it is hard to breathe. You may have trouble with daily activities because of shortness of breath. Over time the shortness of breath usually worsens making it more and more difficult to take care of yourself and take part in activities. Chronic bronchitis and emphysema are two common types of COPD.  What happens in chronic bronchitis?  The cells in the airways make more mucus than normal. The mucus builds up, narrowing the airways. This means less air travels into and out of the lungs. The lining of the airways may also become inflamed (swollen) and causes the airways to narrow even more.            What happens in emphysema?  The small airways are damaged and lose their stretchiness. The airways collapse when you exhale, causing air to get trapped in the air sacs. This means that less oxygen enters the blood vessels and less oxygen is delivered to all of the cells of your body. This makes it hard to breathe.         Damage to cilia  Cilia are small hairs that line and protect the airways. Smoking damages the cilia. Damaged cilia can't sweep mucus and particles away. Some of the cilia are destroyed. This damage worsens COPD.      How did I get COPD?  Most people get COPD from smoking. Cigarette smoke damages lungs, which can develop into COPD over many years.  How COPD affects you  COPD makes you work harder to breathe. Air may get trapped in the lungs, which prevents your lungs from filling completely with fresh oxygen-filled air when you inhale (breathe in). It's harder to take deep breaths especially when you are active and start breathing faster. Over time, your lungs may become enlarged filling the lung with air that does not transfer oxygen into the blood. These problems cause you to have shortness of breath (also called dyspnea). Wheezing (hoarse, whistling  breathing), chronic cough, and fatigue (feeling tired and worn out) are also common.   © 4685-2376 [employer]. [address] [place] [address], [place] [address]. All rights reserved. This information is not intended as a substitute for professional medical care. Always follow your healthcare professional's instructions.                Asthma Trigger Checklist  Allergens, irritants, and other things may trigger your asthma. Check the box next to each of your triggers. After each trigger is a list of ways to avoid it.   Dust mites. Dust mites live in mattresses, bedding, carpets, curtains, and indoor dust.  To kill dust mites, wash bedding in hot water (130°F) each week.  Cover mattress and pillows with special dust-mite-proof cases.  Don't use upholstered furniture like sofas or chairs in the bedroom.  Use allergy-proof filters for air conditioners and furnaces. Replace or clean them as instructed.  If you can, replace carpeting with wood or tile negra, especially in the bedroom.  Animals. Animals with fur or feathers shed dander (allergens).  It's best to choose a pet that doesn't have fur or feathers, such as a fish or a reptile.  If you have pets, keep them off your bed and out of your bedroom.  Wash your hands and clothes after handling pets.    Mold. Mold grows in damp places, such as bathrooms, basements, and closets.  Ask someone to clean damp areas in your home every week. Or try wearing a face mask while you clean.  Run an exhaust fan while bathing. Or leave a window open in the bathroom.  Repair water leaks in or around your home.  Have someone else cut grass or rake leaves, if possible.  Don't use vaporizers or humidifiers. They encourage mold growth.    Pollen. Pollen from trees, grasses, and weeds is a common allergen. (Flower pollens are generally not a problem).  Try to learn what types of pollen affect you most. Pollen levels vary depending on the plant, the season, and the time of day.  If  possible, use air conditioning instead of opening the windows in your home or car.  Have someone else do yard work, if possible.    Cockroaches. Roaches are found in many homes and produce allergens.  Keep your kitchen clean and dry. A leaky faucet or drain can attract roaches.  Remove garbage from your home daily.  Store food in tightly sealed containers. Wash dishes as soon as they are used.  Use bait stations or traps to control roaches. Avoid using chemical sprays.    Smoke. Smoke may be from cigarettes, cigars, pipes, incense or candles, barbecues or grills, and fireplaces.  Don't smoke. And don't let people smoke in your home or car.  When you travel, ask for nonsmoking rental cars and hotel rooms.  Avoid fireplaces and wood stoves. If you can't, sit away from them. Make sure the smoke is directed outside.  Don't burn incense or use candles.  Move away from smoky outdoor cooking grills.    Smog.  Smog is from car exhaust and other pollution.  Read or listen to local air-quality reports. These let you know when air quality is poor.  Stay indoors as much as you can on smoggy days. If possible, use air conditioning instead of opening the windows.  In your car, set air conditioning to recirculate air, so less pollution gets in.    Strong odors. These include air fresheners, deodorizers, and cleaning products; perfume, deodorant, and other beauty products; incense and candles; and insect sprays and other sprays.  Use scent-free products like deodorant or body lotion.  Avoid using cleaning products with bleach and ammonia. Make your own cleaning solution with white vinegar, baking soda, or mild dish soap.  Use exhaust fans while cooking. Or open a window, if possible.   Avoid perfumes, air fresheners, potpourri, and other scented products.          Other irritants. These include dust, aerosol sprays, and powders.  Wear a face mask while doing tasks like sanding, dusting, sweeping, and yard work. Open doors and  windows if working indoors.  Use pump spray bottles instead of aerosols.  Pour liquid  onto a rag or cloth instead of spraying them.    Weather. Weather conditions can trigger symptoms or make them worse.  Watch for very high or low temperatures, very humid conditions, or a lot of wind, as these conditions can make symptoms worse.  Limit outdoor activity during the type of weather that affects you.  Wear a scarf over your mouth and nose in cold weather.    Colds, flu, and sinus infections. Upper respiratory infections can trigger asthma.  Wash your hands often with soap and warm water or use a hand  containing alcohol.  Get a yearly flu shot. And ask if you should get a pneumonia vaccine.  Take care of your general health. Get plenty of sleep. And eat a variety of healthy foods.    Food additives. Food additives can trigger asthma flare-ups in some people.  Check food labels for sulfites or other similar ingredients. These are often found in foods such as wine, beer, and dried fruits.  Avoid foods that contain these additives.    Medicine. Aspirin, NSAIDS like ibuprofen and naproxen, and heart medicines like beta-blockers may be triggers.  Tell your health care provider if you think certain medicines trigger symptoms.   Be sure to read the labels on over-the-counter medicines. They may have ingredients that cause symptoms for you.   , Emotions. Laughing, crying, or feeling excited are triggers for some people.   To help you stay calm: Try breathing in slowly through your nose for a count of 2 seconds. Close your lips and breathe out for 4 seconds. Repeat.  Try to focus on a soothing image in your mind. This will help relax you and calm your breathing.  Remember to take your daily controller medicines. When you're upset or under stress, it's easy to forget.    Exercise. For some people, exercise can trigger symptoms. Don't let this keep you from being active.   If you have not been exercising  regularly, start slow and work up gradually.  Take all of your medicines as prescribed.  If you use quick-relief medicine, make sure you have it with you when you exercise.  Stop if you have any symptoms. Make sure you talk with your provider about these symptoms.  © 6630-0945 [employer]. [address] [place] [address]. All rights reserved. This information is not intended as a substitute for professional medical care. Always follow your healthcare professional's instructions.             Using Dry-Powder Inhalers (DPIs)  Some asthma medications are inhaled using inhalers. Dry-powder inhalers (DPIs) dispense measured doses of powdered medicine into your lungs. DPIs often have counters to track the number of doses used. Keep in mind that DPIs don't all work the same way. So be sure you know how to use yours properly.  Using a DPI  Load the prescribed dose of medicine by following the instructions that came with the inhaler.  Breathe out normally, holding the inhaler away from your mouth. Hold your chin up.  Put the mouthpiece between your lips. Breathe in deeply through the inhaler--not through your nose. You may not feel or taste the medicine as you breathe in. This is normal.  Take the mouthpiece out of your mouth. Hold your breath for a count of 10, or as long as you can comfortably.  Breathe out slowly--but do not breathe out through the inhaler. Moisture from your breath can make the powder stick inside the inhaler.  Be sure to close the inhaler and store it in a dry place.  Rinse your mouth with water and spit it out, don't swallow it.  Do not wash your DPI with soap and water. To clean the mouthpiece, wipe with a dry cloth as needed.    © 4759-9605 [employer]. [address] [place] [address]. All rights reserved. This information is not intended as a substitute for professional medical care. Always follow your healthcare professional's instructions.             Using an Inhaler with a Spacer  To control asthma, you need to use your medicines the right way. Some medicines are inhaled using a device called a metered-dose inhaler (MDI). Metered-dose inhalers deliver medicine with a fine spray. You may be asked to use a spacer (holding tube) with your inhaler. The spacer helps make sure all the medicine you need goes into your lungs.   Steps for using an inhaler with a spacer  Step 1:  Remove the caps from the inhaler and spacer.  Shake the inhaler well and attach the spacer. If the inhaler is being used for the first time or has not been used for a while, prime it as directed by the product maker.  Step 2:  Breathe out normally.  Put the spacer between your teeth. Close your lips tightly around it.  Keep your chin up.  Step 3:  Spray 1 puff into the spacer by pressing down on the inhaler.  Then breathe in through your mouth as slowly and deeply as you can. This should take about 5-10 seconds. If you breathe too quickly, you may hear a whistling sound in certain spacers.  Step 4:  Take the spacer out of your mouth.  Hold your breath for a count of 10.  Then hold your lips together and slowly breathe out through your mouth ACTION PLAN    GREEN ZONE  My sputum is clear/white/usual color and easily cleared.  My breathing is no harder than usual.  I can do my usual activities.  I can think clearly.   Take your usual medicines, including oxygen, as you are told to do so by your health care provider.   YELLOW ZONE  My sputum has change (color, thickness, amount).  I am more short of breath than usual.  I cough or wheeze more.  I weigh more and my legs/feet swell.  I cannot do my usual activities without resting.   Call your health care provider. You will probably be told to begin taking an antibiotic and prednisone. Have your pharmacy phone number available.   RED ZONE  I cannot cough out my sputum.  I am much more short of breath than normal.  I need to sit up to breathe  I cannot  do my usual activities.  I am unable to speak more than one or two words at a time.  I am confused.   Call your health care provider. You may be asked to come in to be seen, told to go to the emergency room, or told to call 9-1-1.

## 2023-03-29 LAB
BRPFT: NORMAL
DLCO ADJ PRE: 10.64 ML/(MIN*MMHG)
DLCO SINGLE BREATH LLN: 13.99
DLCO SINGLE BREATH PRE REF: 53.9 %
DLCO SINGLE BREATH REF: 19.73
DLCOC SBVA LLN: 2.92
DLCOC SBVA PRE REF: 123 %
DLCOC SBVA REF: 4.65
DLCOC SINGLE BREATH LLN: 13.99
DLCOC SINGLE BREATH PRE REF: 53.9 %
DLCOC SINGLE BREATH REF: 19.73
DLCOVA LLN: 2.92
DLCOVA PRE REF: 123 %
DLCOVA PRE: 5.72 ML/(MIN*MMHG*L)
DLCOVA REF: 4.65
DLVAADJ PRE: 5.72 ML/(MIN*MMHG*L)
ERV LLN: -16449.29
ERV PRE REF: 22.2 %
ERV REF: 0.71
FEF 25 75 CHG: 10.2 %
FEF 25 75 LLN: 0.95
FEF 25 75 POST REF: 69.9 %
FEF 25 75 PRE REF: 63.4 %
FEF 25 75 REF: 1.92
FET100 CHG: -12.6 %
FEV1 CHG: 2.3 %
FEV1 FVC CHG: 1 %
FEV1 FVC LLN: 67
FEV1 FVC POST REF: 97.8 %
FEV1 FVC PRE REF: 96.8 %
FEV1 FVC REF: 79
FEV1 LLN: 1.55
FEV1 POST REF: 69.2 %
FEV1 PRE REF: 67.6 %
FEV1 REF: 2.07
FRCPLETH LLN: 1.65
FRCPLETH PREREF: 68.4 %
FRCPLETH REF: 2.47
FVC CHG: 1.3 %
FVC LLN: 1.96
FVC POST REF: 70.4 %
FVC PRE REF: 69.5 %
FVC REF: 2.62
IVC PRE: 1.04 L
IVC SINGLE BREATH LLN: 1.96
IVC SINGLE BREATH PRE REF: 39.8 %
IVC SINGLE BREATH REF: 2.62
MVV LLN: 62
MVV PRE REF: 56 %
MVV REF: 73
PEF CHG: -10.4 %
PEF LLN: 4.01
PEF POST REF: 52 %
PEF PRE REF: 58 %
PEF REF: 5.5
POST FEF 25 75: 1.34 L/S
POST FET 100: 6.19 SEC
POST FEV1 FVC: 77.71 %
POST FEV1: 1.43 L
POST FVC: 1.84 L
POST PEF: 2.86 L/S
PRE DLCO: 10.64 ML/(MIN*MMHG)
PRE ERV: 0.16 L
PRE FEF 25 75: 1.22 L/S
PRE FET 100: 7.09 SEC
PRE FEV1 FVC: 76.94 %
PRE FEV1: 1.4 L
PRE FRC PL: 1.69 L
PRE FVC: 1.82 L
PRE MVV: 41 L/MIN
PRE PEF: 3.19 L/S
PRE RV: 1.37 L
PRE TLC: 3.19 L
RAW LLN: 3.06
RAW PRE REF: 363.4 %
RAW PRE: 11.12 CMH2O*S/L
RAW REF: 3.06
RV LLN: 1.18
RV PRE REF: 77.7 %
RV REF: 1.76
RVTLC LLN: 31
RVTLC PRE REF: 106.2 %
RVTLC PRE: 42.88 %
RVTLC REF: 40
TLC LLN: 3.25
TLC PRE REF: 75.2 %
TLC REF: 4.24
VA PRE: 1.86 L
VA SINGLE BREATH LLN: 4.09
VA SINGLE BREATH PRE REF: 45.4 %
VA SINGLE BREATH REF: 4.09
VC LLN: 1.96
VC PRE REF: 69.5 %
VC PRE: 1.82 L
VC REF: 2.62
VTGRAWPRE: 1.74 L

## 2023-03-30 PROBLEM — J44.1 COPD EXACERBATION: Status: ACTIVE | Noted: 2023-03-30

## 2023-04-03 ENCOUNTER — PATIENT MESSAGE (OUTPATIENT)
Dept: ADMINISTRATIVE | Facility: OTHER | Age: 63
End: 2023-04-03
Payer: MEDICARE

## 2023-04-17 ENCOUNTER — TELEPHONE (OUTPATIENT)
Dept: PULMONOLOGY | Facility: CLINIC | Age: 63
End: 2023-04-17
Payer: MEDICARE

## 2023-04-17 DIAGNOSIS — F41.1 GAD (GENERALIZED ANXIETY DISORDER): Primary | ICD-10-CM

## 2023-04-17 DIAGNOSIS — G47.09 OTHER INSOMNIA: ICD-10-CM

## 2023-04-17 NOTE — TELEPHONE ENCOUNTER
----- Message from Ashley Toure sent at 4/17/2023 12:57 PM CDT -----  Contact: [place]  Patient is calling in regards to clonazepam. Reports [facility] no longer carries the medication and her Insurance will not pay for 0.5 mg due to how patient takes the medication. Pt stated listed pharmacy does carry it and needs office to send a script for the clonazepam 1mg as soon as possible. Please call patient to notify when script is sent       [facility]   Address: [address] [place] [address]  Phone #:[phone]

## 2023-04-18 ENCOUNTER — OFFICE VISIT (OUTPATIENT)
Dept: OTOLARYNGOLOGY | Facility: CLINIC | Age: 63
End: 2023-04-18
Payer: MEDICARE

## 2023-04-18 VITALS — TEMPERATURE: 98 F | BODY MASS INDEX: 30.27 KG/M2 | HEIGHT: 59 IN | WEIGHT: 150.13 LBS

## 2023-04-18 DIAGNOSIS — J02.9 SORE THROAT: ICD-10-CM

## 2023-04-18 DIAGNOSIS — K21.9 LARYNGOPHARYNGEAL REFLUX (LPR): Primary | ICD-10-CM

## 2023-04-18 PROCEDURE — 3008F PR BODY MASS INDEX (BMI) DOCUMENTED: ICD-10-PCS | Mod: CPTII,S$GLB,, | Performed by: OTOLARYNGOLOGY

## 2023-04-18 PROCEDURE — 1159F MED LIST DOCD IN RCRD: CPT | Mod: CPTII,S$GLB,, | Performed by: OTOLARYNGOLOGY

## 2023-04-18 PROCEDURE — 1159F PR MEDICATION LIST DOCUMENTED IN MEDICAL RECORD: ICD-10-PCS | Mod: CPTII,S$GLB,, | Performed by: OTOLARYNGOLOGY

## 2023-04-18 PROCEDURE — 99999 PR PBB SHADOW E&M-EST. PATIENT-LVL IV: CPT | Mod: PBBFAC,,, | Performed by: OTOLARYNGOLOGY

## 2023-04-18 PROCEDURE — 99203 OFFICE O/P NEW LOW 30 MIN: CPT | Mod: S$GLB,,, | Performed by: OTOLARYNGOLOGY

## 2023-04-18 PROCEDURE — 4010F PR ACE/ARB THEARPY RXD/TAKEN: ICD-10-PCS | Mod: CPTII,S$GLB,, | Performed by: OTOLARYNGOLOGY

## 2023-04-18 PROCEDURE — 99203 PR OFFICE/OUTPT VISIT, NEW, LEVL III, 30-44 MIN: ICD-10-PCS | Mod: S$GLB,,, | Performed by: OTOLARYNGOLOGY

## 2023-04-18 PROCEDURE — 99999 PR PBB SHADOW E&M-EST. PATIENT-LVL IV: ICD-10-PCS | Mod: PBBFAC,,, | Performed by: OTOLARYNGOLOGY

## 2023-04-18 PROCEDURE — 3008F BODY MASS INDEX DOCD: CPT | Mod: CPTII,S$GLB,, | Performed by: OTOLARYNGOLOGY

## 2023-04-18 PROCEDURE — 4010F ACE/ARB THERAPY RXD/TAKEN: CPT | Mod: CPTII,S$GLB,, | Performed by: OTOLARYNGOLOGY

## 2023-04-18 RX ORDER — FAMOTIDINE 20 MG/1
20 TABLET, FILM COATED ORAL 2 TIMES DAILY
Qty: 60 TABLET | Refills: 5 | Status: SHIPPED | OUTPATIENT
Start: 2023-04-18 | End: 2023-10-02

## 2023-04-18 NOTE — PROGRESS NOTES
"Referring Provider:    Álvaro Sanchez Md  [address] [facility]  [place] [address]  Subjective:   Patient: Kenya Majano [id number], :1960   Visit date:2023 11:03 AM    Chief Complaint:  Sore Throat (Pt states has had a sore throat for a couple of months. Pt states her mouth breaks out with nebulizer treatments in the corners/. )    HPI:    Prior notes reviewed by myself.  Clinical documentation obtained by nursing staff reviewed.     62-year-old female presents for evaluation of recurrent sore throats.  She has had intermittent sore throat for 2-3 months and occasional dysphagia especially to pills.  She does report some episodes of throat clearing and globus sensation.  She is not had any recent episodes of heartburn.  She does use an inhaler/nebulizer for COPD, and she feels as if this may exacerbate her sore throat at times.      Objective:     Physical Exam:  Vitals:  Temp 97.5 °F (36.4 °C) (Temporal)   Ht 4' 11" (1.499 m)   Wt 68.1 kg (150 lb 2.1 oz)   LMP  (LMP Unknown)   BMI 30.32 kg/m²   General appearance:  Well developed, well nourished    Ears:  Otoscopy of external auditory canals and tympanic membranes was normal, clinical speech reception thresholds grossly intact, no mass/lesion of auricle.    Nose:  No masses/lesions of external nose, nasal mucosa, septum, and turbinates were within normal limits.    Mouth:  No mass/lesion of lips, teeth, gums, hard/soft palate, tongue, tonsils, or oropharynx.    Neck & Lymphatics:  No cervical lymphadenopathy, no neck mass/crepitus/ asymmetry, trachea is midline, no thyroid enlargement/tenderness/mass.        [x]  Data Reviewed:    Lab Results   Component Value Date    WBC 6.83 2022    HGB 16.1 (H) 2022    HCT 47.2 2022    MCV 92 2022    EOSINOPHIL 2.3 2022     Deferred flexible laryngoscopy      Assessment & Plan:   Laryngopharyngeal reflux (LPR)  -     famotidine (PEPCID) 20 MG tablet; Take 1 tablet (20 mg " total) by mouth 2 (two) times daily.  Dispense: 60 tablet; Refill: 5    Sore throat  -     Ambulatory referral/consult to ENT      Her history is consistent with laryngal pharyngeal reflux.  We agreed to treat this and hold off on a flexible laryngoscopy due to her anxiety about the procedure.  I will have her follow-up in 6 weeks.  If she does not adequately improve, I explained that it is important to move forward with flexible laryngoscopy and/or a possible GI evaluation.  She understands and agrees with the plan.    Laryngopharyngeal Reflux (LPR) Patient Information and Medication Instructions    LPR (laryngopharyngeal reflux) can be a challenging condition to control.  Some patients require once daily medication like a proton pump inhibitor to control their symptoms (such as Omeprazole, Pantoprazole, Esomeprazole).  HOWEVER, other patients will require taking this medication twice daily and even may be started on another medication called an H2 blocker (such as Pepcid, Zantac) at bedtime.    It is important that medication is not the only technique that you use to help control your LPR.  Therapeutic lifestyle changes are very important as well:     Weight Loss:  If you are overweight, losing weight can significantly reduce your reflux.  Diet Control:  It is important to determine what your Trigger foods for reflux are.  Limiting your intake of these foods will significantly improve your reflux.  Examples of foods known to increase reflux are listed below  Carbonated beverages  Caffeine (this includes Coffee, soda, iced tea, energy drinks etc.)  Alcohol  Fried/ fatty/ greasy foods  Acidic foods (citrus, tomato etc.)  Chocolate  Spearmint/Peppermint  Elevating the head of your bed:  This doesn't mean more pillows.  You need to actually elevate the head of your bed.  Some patients have found something to put under the legs of the head of their bed.  Other patients have employed a wedge or an air bladder of some  sort to elevate the head of their bed.  This makes a significant difference with reflux and can also help with nasal congestion.  Eating before bedtime:  Try not to eat anything for at least 2 hours before bedtime.  This allows for less material to remain in your stomach when you lay down at night which equals less severe reflux.  More information:  If you would like to read more about diet changes and lifestyle changes that you can employ that will help with your reflux, the books below may be helpful.  Dropping Acid:  The Reflux diet Cookbook & Cure by Charles Lewis M.D. and Augusto Krause M.D.  The Acid Watcher Diet:  A 28 Day Reflux Prevention and Healing Program by Oliverio Coon M.D.      Weaning Instructions After Symptom Improvement    It can sometimes take up to 12 weeks to see symptom improvement in LPR.  The medications may reduce the amount of acid you are producing very quickly, but then the tissues of your voice box and upper throat have to heal themselves.  Your physician may have increased year proton pump inhibitor to twice daily dosing and even may have added an H2 blocker at bedtime.  Eventually, the goal is a complete resolution of your symptoms.  Hopefully you have been working on the lifestyle modifications listed above over this time period as well!    Once your symptoms have improved, our goal is to wean you back off of your reflux medication.  The instructions below will help guide you through this process.    Take all anti-reflux medications for at least 3 weeks beyond when your symptoms have improved/resolved  If you are on Twice daily dosing of a proton pump inhibitor (omeprazole, pantoprazole, esomeprazole etc.) and an H2 blocker at bedtime (pepcid, zantac) then you should first discontinue your night time dose of your proton pump inhibitor.  If your symptoms are still controlled after 3 weeks of taking your PPI in the morning and your H2 blocker at bedtime,  discontinue your bedtime H2  blocker  If your symptoms are still controlled after 3 more weeks of only taking your PPI in the morning, discontinue your morning PPI    If at any point your symptoms return during this weaning process, you can return to the previous step and let your physician know at the next appointment.

## 2023-04-19 ENCOUNTER — TELEPHONE (OUTPATIENT)
Dept: PULMONOLOGY | Facility: CLINIC | Age: 63
End: 2023-04-19
Payer: MEDICARE

## 2023-04-19 RX ORDER — CLONAZEPAM 1 MG/1
1 TABLET ORAL 2 TIMES DAILY
Qty: 60 TABLET | Refills: 5 | Status: SHIPPED | OUTPATIENT
Start: 2023-04-19 | End: 2023-09-25 | Stop reason: SDUPTHER

## 2023-04-19 NOTE — TELEPHONE ENCOUNTER
Pt states clonazepam needs to be ordered for a quantity of 90.  But if she takes 4 daily that is only 22 days worth.    Please clarify.

## 2023-04-19 NOTE — TELEPHONE ENCOUNTER
Chronic Disease Management:  Patient called in to PDM to follow up regarding a call left for Dr. Sanchez pertaining to a medication issue.    Patient reports she has been unable to obtain:   clonazePAM (KLONOPIN) 0.5 MG disintegrating tablet  Due to a quantity limit that her rx plan has on the drug.    Patient is requesting the prescription to be sent to the pharmacy for 90 tablets instead of 120 tablets.    Message sent to provider staff.

## 2023-05-01 ENCOUNTER — TELEPHONE (OUTPATIENT)
Dept: PULMONOLOGY | Facility: CLINIC | Age: 63
End: 2023-05-01
Payer: MEDICARE

## 2023-05-01 DIAGNOSIS — J44.1 COPD EXACERBATION: Primary | ICD-10-CM

## 2023-05-01 NOTE — TELEPHONE ENCOUNTER
----- Message from Ivy Shea sent at 5/1/2023  9:49 AM CDT -----  Contact: Self  Patient is calling regarding a bad cough, she states that Dr Sanchez wanted to know if the cough returned.She would like something called in. Please call back [phone]

## 2023-05-02 ENCOUNTER — PATIENT MESSAGE (OUTPATIENT)
Dept: PULMONOLOGY | Facility: CLINIC | Age: 63
End: 2023-05-02
Payer: MEDICARE

## 2023-05-02 ENCOUNTER — TELEPHONE (OUTPATIENT)
Dept: PULMONOLOGY | Facility: CLINIC | Age: 63
End: 2023-05-02
Payer: MEDICARE

## 2023-05-02 DIAGNOSIS — J44.1 COPD EXACERBATION: Primary | ICD-10-CM

## 2023-05-02 RX ORDER — METHYLPREDNISOLONE 4 MG/1
TABLET ORAL
Qty: 1 EACH | Refills: 0 | Status: SHIPPED | OUTPATIENT
Start: 2023-05-02 | End: 2023-06-14 | Stop reason: ALTCHOICE

## 2023-05-02 RX ORDER — AZITHROMYCIN 250 MG/1
TABLET, FILM COATED ORAL
Qty: 6 TABLET | Refills: 0 | Status: SHIPPED | OUTPATIENT
Start: 2023-05-02 | End: 2023-09-12

## 2023-05-02 NOTE — TELEPHONE ENCOUNTER
Chronic Disease Management:   Returned call to patient.   Patient is experiencing an increase in sputum production and coughing episodes. Patient is requesting medication or follow up visit.    Message sent to provider staff to have the patient scheduled.

## 2023-05-03 ENCOUNTER — PATIENT MESSAGE (OUTPATIENT)
Dept: ADMINISTRATIVE | Facility: OTHER | Age: 63
End: 2023-05-03
Payer: MEDICARE

## 2023-05-31 ENCOUNTER — PATIENT MESSAGE (OUTPATIENT)
Dept: RESEARCH | Facility: HOSPITAL | Age: 63
End: 2023-05-31
Payer: MEDICARE

## 2023-06-14 ENCOUNTER — TELEPHONE (OUTPATIENT)
Dept: PULMONOLOGY | Facility: CLINIC | Age: 63
End: 2023-06-14
Payer: MEDICARE

## 2023-06-14 DIAGNOSIS — J44.1 COPD EXACERBATION: Primary | ICD-10-CM

## 2023-06-14 RX ORDER — DOXYCYCLINE 100 MG/1
100 CAPSULE ORAL 2 TIMES DAILY
Qty: 20 CAPSULE | Refills: 0 | Status: SHIPPED | OUTPATIENT
Start: 2023-06-14 | End: 2023-09-12

## 2023-06-14 RX ORDER — PREDNISONE 20 MG/1
TABLET ORAL
Qty: 20 TABLET | Refills: 0 | Status: SHIPPED | OUTPATIENT
Start: 2023-06-14 | End: 2023-09-12

## 2023-06-14 NOTE — TELEPHONE ENCOUNTER
----- Message from Mert Zamorano sent at 6/14/2023  8:49 AM CDT -----  Contact: [place]  Pt is calling in regards to needing something called in for sinus infection. Pt stated having a bad cough and trouble breathing. Pt stated that oxygen levels dropped a few times but came right back up. Please call back at [phone]                              Thanks  KT

## 2023-06-20 ENCOUNTER — OFFICE VISIT (OUTPATIENT)
Dept: OTOLARYNGOLOGY | Facility: CLINIC | Age: 63
End: 2023-06-20
Payer: MEDICARE

## 2023-06-20 VITALS — TEMPERATURE: 97 F | WEIGHT: 153.44 LBS | BODY MASS INDEX: 30.12 KG/M2 | HEIGHT: 60 IN

## 2023-06-20 DIAGNOSIS — K21.9 LARYNGOPHARYNGEAL REFLUX (LPR): Primary | ICD-10-CM

## 2023-06-20 PROCEDURE — 4010F ACE/ARB THERAPY RXD/TAKEN: CPT | Mod: CPTII,S$GLB,, | Performed by: OTOLARYNGOLOGY

## 2023-06-20 PROCEDURE — 4010F PR ACE/ARB THEARPY RXD/TAKEN: ICD-10-PCS | Mod: CPTII,S$GLB,, | Performed by: OTOLARYNGOLOGY

## 2023-06-20 PROCEDURE — 99213 OFFICE O/P EST LOW 20 MIN: CPT | Mod: S$GLB,,, | Performed by: OTOLARYNGOLOGY

## 2023-06-20 PROCEDURE — 1159F MED LIST DOCD IN RCRD: CPT | Mod: CPTII,S$GLB,, | Performed by: OTOLARYNGOLOGY

## 2023-06-20 PROCEDURE — 1159F PR MEDICATION LIST DOCUMENTED IN MEDICAL RECORD: ICD-10-PCS | Mod: CPTII,S$GLB,, | Performed by: OTOLARYNGOLOGY

## 2023-06-20 PROCEDURE — 3008F PR BODY MASS INDEX (BMI) DOCUMENTED: ICD-10-PCS | Mod: CPTII,S$GLB,, | Performed by: OTOLARYNGOLOGY

## 2023-06-20 PROCEDURE — 99999 PR PBB SHADOW E&M-EST. PATIENT-LVL III: ICD-10-PCS | Mod: PBBFAC,,, | Performed by: OTOLARYNGOLOGY

## 2023-06-20 PROCEDURE — 99999 PR PBB SHADOW E&M-EST. PATIENT-LVL III: CPT | Mod: PBBFAC,,, | Performed by: OTOLARYNGOLOGY

## 2023-06-20 PROCEDURE — 99213 PR OFFICE/OUTPT VISIT, EST, LEVL III, 20-29 MIN: ICD-10-PCS | Mod: S$GLB,,, | Performed by: OTOLARYNGOLOGY

## 2023-06-20 PROCEDURE — 3008F BODY MASS INDEX DOCD: CPT | Mod: CPTII,S$GLB,, | Performed by: OTOLARYNGOLOGY

## 2023-06-20 NOTE — PROGRESS NOTES
Referring Provider:    No referring provider defined for this encounter.  Subjective:   Patient: Kenya Majano [id number], :1960   Visit date:2023 11:03 AM    Chief Complaint:  LPR F/U (Pt states she is much better. States the only issue she is having is throat burns when eats spicy food)    HPI:    Prior notes reviewed by myself.  Clinical documentation obtained by nursing staff reviewed.     62-year-old female presents for evaluation of recurrent sore throats.  She has had intermittent sore throat for 2-3 months and occasional dysphagia especially to pills.  She does report some episodes of throat clearing and globus sensation.  She is not had any recent episodes of heartburn.  She does use an inhaler/nebulizer for COPD, and she feels as if this may exacerbate her sore throat at times.    23 update:  Feels that PPI has helped significantly.      Objective:     Physical Exam:  Vitals:  Temp 97.2 °F (36.2 °C) (Temporal)   Ht 5' (1.524 m)   Wt 69.6 kg (153 lb 7 oz)   LMP  (LMP Unknown)   BMI 29.97 kg/m²   General appearance:  Well developed, well nourished    Ears:  Otoscopy of external auditory canals and tympanic membranes was normal, clinical speech reception thresholds grossly intact, no mass/lesion of auricle.    Nose:  No masses/lesions of external nose, nasal mucosa, septum, and turbinates were within normal limits.    Mouth:  No mass/lesion of lips, teeth, gums, hard/soft palate, tongue, tonsils, or oropharynx.    Neck & Lymphatics:  No cervical lymphadenopathy, no neck mass/crepitus/ asymmetry, trachea is midline, no thyroid enlargement/tenderness/mass.        [x]  Data Reviewed:    Lab Results   Component Value Date    WBC 6.83 2022    HGB 16.1 (H) 2022    HCT 47.2 2022    MCV 92 2022    EOSINOPHIL 2.3 2022     Deferred flexible laryngoscopy      Assessment & Plan:   Laryngopharyngeal reflux (LPR)        Her history is consistent with  laryngopharyngeal reflux.  We agreed to treat this and hold off on a flexible laryngoscopy due to her anxiety about the procedure.  I will have her follow-up in 6 weeks.  If she does not adequately improve, I explained that it is important to move forward with flexible laryngoscopy and/or a possible GI evaluation.  She understands and agrees with the plan.    6/20/23 update:  Feels that PPI has helped significantly.  She will continue to f/u with her PCP    Laryngopharyngeal Reflux (LPR) Patient Information and Medication Instructions    LPR (laryngopharyngeal reflux) can be a challenging condition to control.  Some patients require once daily medication like a proton pump inhibitor to control their symptoms (such as Omeprazole, Pantoprazole, Esomeprazole).  HOWEVER, other patients will require taking this medication twice daily and even may be started on another medication called an H2 blocker (such as Pepcid, Zantac) at bedtime.    It is important that medication is not the only technique that you use to help control your LPR.  Therapeutic lifestyle changes are very important as well:     Weight Loss:  If you are overweight, losing weight can significantly reduce your reflux.  Diet Control:  It is important to determine what your Trigger foods for reflux are.  Limiting your intake of these foods will significantly improve your reflux.  Examples of foods known to increase reflux are listed below  Carbonated beverages  Caffeine (this includes Coffee, soda, iced tea, energy drinks etc.)  Alcohol  Fried/ fatty/ greasy foods  Acidic foods (citrus, tomato etc.)  Chocolate  Spearmint/Peppermint  Elevating the head of your bed:  This doesn't mean more pillows.  You need to actually elevate the head of your bed.  Some patients have found something to put under the legs of the head of their bed.  Other patients have employed a wedge or an air bladder of some sort to elevate the head of their bed.  This makes a significant  difference with reflux and can also help with nasal congestion.  Eating before bedtime:  Try not to eat anything for at least 2 hours before bedtime.  This allows for less material to remain in your stomach when you lay down at night which equals less severe reflux.  More information:  If you would like to read more about diet changes and lifestyle changes that you can employ that will help with your reflux, the books below may be helpful.  Dropping Acid:  The Reflux diet Cookbook & Cure by Charles Lewis M.D. and Augusto Krause M.D.  The Acid Watcher Diet:  A 28 Day Reflux Prevention and Healing Program by Oliverio Coon M.D.      Weaning Instructions After Symptom Improvement    It can sometimes take up to 12 weeks to see symptom improvement in LPR.  The medications may reduce the amount of acid you are producing very quickly, but then the tissues of your voice box and upper throat have to heal themselves.  Your physician may have increased year proton pump inhibitor to twice daily dosing and even may have added an H2 blocker at bedtime.  Eventually, the goal is a complete resolution of your symptoms.  Hopefully you have been working on the lifestyle modifications listed above over this time period as well!    Once your symptoms have improved, our goal is to wean you back off of your reflux medication.  The instructions below will help guide you through this process.    Take all anti-reflux medications for at least 3 weeks beyond when your symptoms have improved/resolved  If you are on Twice daily dosing of a proton pump inhibitor (omeprazole, pantoprazole, esomeprazole etc.) and an H2 blocker at bedtime (pepcid, zantac) then you should first discontinue your night time dose of your proton pump inhibitor.  If your symptoms are still controlled after 3 weeks of taking your PPI in the morning and your H2 blocker at bedtime,  discontinue your bedtime H2 blocker  If your symptoms are still controlled after 3 more weeks of  only taking your PPI in the morning, discontinue your morning PPI    If at any point your symptoms return during this weaning process, you can return to the previous step and let your physician know at the next appointment.

## 2023-06-21 DIAGNOSIS — J44.9 CHRONIC OBSTRUCTIVE PULMONARY DISEASE, UNSPECIFIED COPD TYPE: Primary | ICD-10-CM

## 2023-06-21 RX ORDER — ALBUTEROL SULFATE 0.83 MG/ML
2.5 SOLUTION RESPIRATORY (INHALATION) 4 TIMES DAILY PRN
Qty: 360 ML | Refills: 11 | Status: SHIPPED | OUTPATIENT
Start: 2023-06-21 | End: 2023-11-02 | Stop reason: ALTCHOICE

## 2023-06-22 DIAGNOSIS — J44.1 COPD EXACERBATION: Primary | ICD-10-CM

## 2023-06-22 RX ORDER — ALBUTEROL SULFATE 0.83 MG/ML
2.5 SOLUTION RESPIRATORY (INHALATION) EVERY 6 HOURS PRN
Qty: 360 ML | Refills: 11 | Status: SHIPPED | OUTPATIENT
Start: 2023-06-22 | End: 2023-11-02 | Stop reason: ALTCHOICE

## 2023-07-02 ENCOUNTER — PATIENT MESSAGE (OUTPATIENT)
Dept: ADMINISTRATIVE | Facility: OTHER | Age: 63
End: 2023-07-02
Payer: MEDICARE

## 2023-07-06 ENCOUNTER — CLINICAL SUPPORT (OUTPATIENT)
Dept: PULMONOLOGY | Facility: CLINIC | Age: 63
End: 2023-07-06
Payer: MEDICARE

## 2023-07-06 ENCOUNTER — PATIENT OUTREACH (OUTPATIENT)
Dept: PULMONOLOGY | Facility: CLINIC | Age: 63
End: 2023-07-06
Payer: MEDICARE

## 2023-07-06 ENCOUNTER — OFFICE VISIT (OUTPATIENT)
Dept: PULMONOLOGY | Facility: CLINIC | Age: 63
End: 2023-07-06
Payer: MEDICARE

## 2023-07-06 VITALS
WEIGHT: 154.56 LBS | RESPIRATION RATE: 20 BRPM | HEIGHT: 60 IN | BODY MASS INDEX: 30.35 KG/M2 | DIASTOLIC BLOOD PRESSURE: 70 MMHG | HEIGHT: 60 IN | OXYGEN SATURATION: 97 % | OXYGEN SATURATION: 97 % | RESPIRATION RATE: 16 BRPM | SYSTOLIC BLOOD PRESSURE: 128 MMHG | HEART RATE: 88 BPM | HEART RATE: 90 BPM | BODY MASS INDEX: 29.97 KG/M2

## 2023-07-06 DIAGNOSIS — J47.9 ADULT BRONCHIECTASIS: Chronic | ICD-10-CM

## 2023-07-06 DIAGNOSIS — J44.1 COPD EXACERBATION: Primary | ICD-10-CM

## 2023-07-06 DIAGNOSIS — J41.8 MIXED SIMPLE AND MUCOPURULENT CHRONIC BRONCHITIS: Primary | ICD-10-CM

## 2023-07-06 PROCEDURE — 99214 PR OFFICE/OUTPT VISIT, EST, LEVL IV, 30-39 MIN: ICD-10-PCS | Mod: S$GLB,,, | Performed by: INTERNAL MEDICINE

## 2023-07-06 PROCEDURE — 1159F MED LIST DOCD IN RCRD: CPT | Mod: CPTII,S$GLB,, | Performed by: INTERNAL MEDICINE

## 2023-07-06 PROCEDURE — 3008F BODY MASS INDEX DOCD: CPT | Mod: CPTII,S$GLB,, | Performed by: INTERNAL MEDICINE

## 2023-07-06 PROCEDURE — 4010F PR ACE/ARB THEARPY RXD/TAKEN: ICD-10-PCS | Mod: CPTII,S$GLB,, | Performed by: INTERNAL MEDICINE

## 2023-07-06 PROCEDURE — 3078F DIAST BP <80 MM HG: CPT | Mod: CPTII,S$GLB,, | Performed by: INTERNAL MEDICINE

## 2023-07-06 PROCEDURE — 3074F SYST BP LT 130 MM HG: CPT | Mod: CPTII,S$GLB,, | Performed by: INTERNAL MEDICINE

## 2023-07-06 PROCEDURE — 99999 PR PBB SHADOW E&M-EST. PATIENT-LVL III: CPT | Mod: PBBFAC,,,

## 2023-07-06 PROCEDURE — 1160F PR REVIEW ALL MEDS BY PRESCRIBER/CLIN PHARMACIST DOCUMENTED: ICD-10-PCS | Mod: CPTII,S$GLB,, | Performed by: INTERNAL MEDICINE

## 2023-07-06 PROCEDURE — 3074F PR MOST RECENT SYSTOLIC BLOOD PRESSURE < 130 MM HG: ICD-10-PCS | Mod: CPTII,S$GLB,, | Performed by: INTERNAL MEDICINE

## 2023-07-06 PROCEDURE — 1160F RVW MEDS BY RX/DR IN RCRD: CPT | Mod: CPTII,S$GLB,, | Performed by: INTERNAL MEDICINE

## 2023-07-06 PROCEDURE — 99999 PR PBB SHADOW E&M-EST. PATIENT-LVL III: ICD-10-PCS | Mod: PBBFAC,,,

## 2023-07-06 PROCEDURE — 99999 PR PBB SHADOW E&M-EST. PATIENT-LVL IV: ICD-10-PCS | Mod: PBBFAC,,, | Performed by: INTERNAL MEDICINE

## 2023-07-06 PROCEDURE — 1159F PR MEDICATION LIST DOCUMENTED IN MEDICAL RECORD: ICD-10-PCS | Mod: CPTII,S$GLB,, | Performed by: INTERNAL MEDICINE

## 2023-07-06 PROCEDURE — 3008F PR BODY MASS INDEX (BMI) DOCUMENTED: ICD-10-PCS | Mod: CPTII,S$GLB,, | Performed by: INTERNAL MEDICINE

## 2023-07-06 PROCEDURE — 4010F ACE/ARB THERAPY RXD/TAKEN: CPT | Mod: CPTII,S$GLB,, | Performed by: INTERNAL MEDICINE

## 2023-07-06 PROCEDURE — 99999 PR PBB SHADOW E&M-EST. PATIENT-LVL IV: CPT | Mod: PBBFAC,,, | Performed by: INTERNAL MEDICINE

## 2023-07-06 PROCEDURE — 99214 OFFICE O/P EST MOD 30 MIN: CPT | Mod: S$GLB,,, | Performed by: INTERNAL MEDICINE

## 2023-07-06 PROCEDURE — 3078F PR MOST RECENT DIASTOLIC BLOOD PRESSURE < 80 MM HG: ICD-10-PCS | Mod: CPTII,S$GLB,, | Performed by: INTERNAL MEDICINE

## 2023-07-06 RX ORDER — FLUTICASONE FUROATE, UMECLIDINIUM BROMIDE AND VILANTEROL TRIFENATATE 200; 62.5; 25 UG/1; UG/1; UG/1
1 POWDER RESPIRATORY (INHALATION) DAILY
Qty: 60 EACH | Refills: 6 | Status: SHIPPED | OUTPATIENT
Start: 2023-07-06 | End: 2023-11-02 | Stop reason: SDUPTHER

## 2023-07-06 NOTE — PROGRESS NOTES
Pulmonary Disease Management  [facility]  Follow up Visit  Chronic Care Management    Kenya Majano  was seen 7/6/2023  10:00 AM in the Pulmonary Disease Management Clinic for evaluation, education, reinforcement of self management techniques and exacerbation action plan.    Michael Blair    Past Medical History:   Diagnosis Date    Anxiety     Arthritis     Asthma     Cataract     Coronary artery disease     Emphysema of lung     Glaucoma     Hypertension     Seizures     Stroke        Patient's Medications   New Prescriptions    No medications on file   Previous Medications    ALBUTEROL (PROVENTIL) 2.5 MG /3 ML (0.083 %) NEBULIZER SOLUTION    Take 3 mLs (2.5 mg total) by nebulization 4 (four) times daily as needed for Wheezing.    ALBUTEROL (PROVENTIL) 2.5 MG /3 ML (0.083 %) NEBULIZER SOLUTION    Take 3 mLs (2.5 mg total) by nebulization every 6 (six) hours as needed for Wheezing. Rescue    ALBUTEROL (PROVENTIL/VENTOLIN HFA) 90 MCG/ACTUATION INHALER    Inhale 2 puffs into the lungs every 4 (four) hours as needed for Wheezing.    AZITHROMYCIN (ZITHROMAX Z-PHAM) 250 MG TABLET    500 mg on day 1 (two tablets) followed by 250 mg once daily on days 2-5    BIMATOPROST (LUMIGAN) 0.03 % OPHTHALMIC DROPS    Apply 1 drop to eye.    BRINZOLAMIDE-BRIMONIDINE (SIMBRINZA) 1-0.2 % DRPS    Apply 1 drop to eye.    CARBAMAZEPINE (TEGRETOL) 200 MG TABLET    Take 200 mg by mouth 2 (two) times daily.    CLONAZEPAM (KLONOPIN) 0.5 MG DISINTEGRATING TABLET    Take 2 tablets (1 mg total) by mouth 2 (two) times daily as needed (anxiety).    CLONAZEPAM (KLONOPIN) 1 MG TABLET    Take 1 tablet (1 mg total) by mouth 2 (two) times daily.    CLOPIDOGREL (PLAVIX) 75 MG TABLET    Take 1 tablet (75 mg total) by mouth once daily.    CLOTRIMAZOLE (MYCELEX) 10 MG TYSON    Take 10 mg by mouth 5 (five) times daily.    DOXYCYCLINE (VIBRAMYCIN) 100 MG CAP    Take 1 capsule (100 mg total) by mouth 2 (two) times daily.    EZETIMIBE  (ZETIA) 10 MG TABLET    Take 1 tablet (10 mg total) by mouth once daily.    FAMOTIDINE (PEPCID) 20 MG TABLET    Take 1 tablet (20 mg total) by mouth 2 (two) times daily.    FLUTICASONE PROPIONATE (FLONASE) 50 MCG/ACTUATION NASAL SPRAY    2 sprays by Nasal route daily as needed.    FLUTICASONE-SALMETEROL DISKUS INHALER 250-50 MCG    Inhale 1 puff into the lungs 2 (two) times daily. Controller. Wash out mouth after use    IPRATROPIUM (ATROVENT) 0.02 % NEBULIZER SOLUTION    Take 2.5 mLs (500 mcg total) by nebulization every 6 (six) hours. Rescue    LINZESS 290 MCG CAP CAPSULE    Take 290 mcg by mouth.    LISINOPRIL-HYDROCHLOROTHIAZIDE (PRINZIDE,ZESTORETIC) 20-25 MG TAB    Take 1 tablet by mouth once daily.    PREDNISONE (DELTASONE) 20 MG TABLET    Prednisone 60 mg/ day for 3 days, 40 mg/day for 3 days,20 mg/ day for 3 days, (1/2 tablet )10 mg a day for 3 days.    ROSUVASTATIN (CRESTOR) 40 MG TAB    Take 1 tablet (40 mg total) by mouth once daily.    SERTRALINE (ZOLOFT) 100 MG TABLET    Take 1 tablet (100 mg total) by mouth once daily.   Modified Medications    No medications on file   Discontinued Medications    No medications on file             Educational assessment:   [x]            Good  []            Fair  []            Poor    Readiness to learn:   [x]            Good  []            Fair  []            Poor    Vision Status:   [x]            Good  []            Fair  []            Poor    Reading Ability:  [x]            Good  []            Fair  []            Poor    Knowledge of condition:   [x]            Good  []            Fair  []            Poor    Language Barriers:   []            Good  []            Fair  []            Poor  [x]            None    Cognitive/ Physical Barriers:   []            Good  []            Fair  []            Poor  [x]            None    Learning best by:                       [x]            Seeing  []            Hearing  []            Reading                         [x]             Doing    Describe any barrier /Limitation or financial implications of care choices identified     []            Financial  []            Emotional  []            Education  []            Vision/Hearing  []            Physical  [x]            None  []                TOPIC /CONTENT FOR TODAY:    [x]            MDI with or without spacer  [x]            Dry powder inhaler  []            Acapella   []           Peak Flow meter  [x]            COPD action plan  [x]            Nebulizer use  [x]            Oxygen use safety  [x]            Breathing and cough techniques  [x]            Energy conservation  [x]            Infection prevention  []           OTHER________________________        Learner:    [x]            Patient   []            Caregiver    Method:    [x]            Verbal explanation  [x]            Audio visual    [x]            Literature  [x]            Teach back      Evaluation:    [x]            Teach back  [x]            Demonstrate  [x]            Follow up phone call    []            2 weeks     [x]            4 weeks   [x]            PRN    Patient Concerns:  Patient has concerns regarding increase in exertional dyspnea and sputum production. Patient reports persistent frequent cough. Patient was treated for exacerbation less than one month prior and initially felt relief in symptoms.   -Patient scheduled to see Dr. Thomas for same day visit to address concerns.    Additional comments:   Patient was seen today to review respiratory medication purpose and proper technique for use of inhalers. Patient practiced proper technique using MDI with valved holding chamber (spacer) and DPI inhalers. Patient demonstrated understanding. Literature was given to patient.    Reviewed breathing techniques such as pursed-lip breathing, quezada-cough technique, and diaphragmatic breathing. Patient practiced proper technique and verbalized understanding. Literature given to patient.       Asthma trigger checklist  was verbally reviewed and literature given to patient.     Infection prevention was discussed. Patient's immunizations are current. Hand hygiene and cleaning of respiratory equipment was also discussed. Patient verbalized understanding.      COPD action plan was reviewed and literature was given to patient. Patient verbalized understanding.      Plan:  Monthly Pulmonary Disease Management Questionnaire  Follow-up PDM appointment scheduled for 9/28/23   Reinforce education  Meds: Advair (possibly consider increase strength or triple therapy)   DME Needs: [facility]   Action Plan: COPD   Immunization: Pneumococcal- current, Flu-current , Covid 19- current  Next Provider Visit: 9/28/23  Next Spirometry/CPFT: not scheduled   Approximate time spent with patient: 30 mins

## 2023-07-06 NOTE — PROGRESS NOTES
Subjective:      Patient ID: Kenya Majano is a 63 y.o. female.    Chief Complaint: COPD    COPD      Patient is a 63-year-old female with past medical history of severe COPD and chronic respiratory failure with hypoxemia on home oxygen who is normally followed by Dr. Sanchez.  Current inhaler regimen is Advair 250 and duo nebs t.i.d..  Few months ago she had an exacerbation that was treated with antibiotics and steroids which helped her symptoms but since that time she has developed progressively worsening cough and dyspnea with exertion.  Cough is mostly nonproductive but if she really tries she can expectorate a very thick gummy rubbery sputum that is yellow to brown.  No ongoing fevers or chills.  She was being followed by our respiratory disease management respiratory therapist today and because of her worsening symptoms she requested that I see the patient.    Review of Systems as per history of present illness otherwise negative  Objective:     Physical Exam   Constitutional: She is oriented to person, place, and time. She appears well-developed. No distress.   Chronically ill-appearing   Cardiovascular: Normal rate and regular rhythm.   Pulmonary/Chest: Effort normal. She has decreased breath sounds. She has no wheezes. She has rhonchi.   Musculoskeletal:         General: No edema.      Cervical back: Neck supple.   Neurological: She is alert and oriented to person, place, and time.   Nursing note and vitals reviewed.       Assessment:     1. Mixed simple and mucopurulent chronic bronchitis    2. Adult bronchiectasis      Review of previously obtained CT of the chest from January of this year shows cylindrical central bronchiectasis with some retained central airway mucus in addition to emphysema and other chronic abnormalities      Plan:     Current plan is the following  -we will step up inhaler regimen to Trelegy 200, sent prescription to the pharmacy  -we will add 3% saline nebs t.i.d..  I gave  him explicit and detailed instructions on how to prepare 3% saline at home and store it for daily use  -she may have ongoing contributor to her cough with reflux but we will implement the above changes before stepping up anti-reflux therapy  -if she still has persistent nighttime cough at next visit she probably warrants increase in her reflux medications especially at bedtime  -continue oxygen as prescribed  -I discussed the above in detail with the patient and her  who voiced understanding and agreement with this plan  -she has follow-up already scheduled with Dr. Lara and we will keep that appointment

## 2023-07-10 ENCOUNTER — HOSPITAL ENCOUNTER (OUTPATIENT)
Dept: RADIOLOGY | Facility: HOSPITAL | Age: 63
Discharge: HOME | End: 2023-07-10
Attending: NURSE PRACTITIONER
Payer: MEDICARE

## 2023-07-10 DIAGNOSIS — Z12.2 SCREENING FOR LUNG CANCER: ICD-10-CM

## 2023-07-10 PROCEDURE — 71271 CT THORAX LUNG CANCER SCR C-: CPT | Mod: GA,TC,PN

## 2023-07-10 PROCEDURE — 71271 CT CHEST LUNG SCREENING LOW DOSE: ICD-10-PCS | Mod: 26,GA,, | Performed by: RADIOLOGY

## 2023-07-10 PROCEDURE — 71271 CT THORAX LUNG CANCER SCR C-: CPT | Mod: 26,GA,, | Performed by: RADIOLOGY

## 2023-08-25 ENCOUNTER — TELEPHONE (OUTPATIENT)
Dept: INTERNAL MEDICINE | Facility: CLINIC | Age: 63
End: 2023-08-25
Payer: MEDICARE

## 2023-08-25 ENCOUNTER — OFFICE VISIT (OUTPATIENT)
Dept: URGENT CARE | Facility: CLINIC | Age: 63
End: 2023-08-25
Payer: MEDICARE

## 2023-08-25 VITALS
DIASTOLIC BLOOD PRESSURE: 65 MMHG | RESPIRATION RATE: 18 BRPM | TEMPERATURE: 97 F | SYSTOLIC BLOOD PRESSURE: 142 MMHG | WEIGHT: 154 LBS | BODY MASS INDEX: 30.23 KG/M2 | HEART RATE: 89 BPM | HEIGHT: 60 IN | OXYGEN SATURATION: 93 %

## 2023-08-25 DIAGNOSIS — R05.9 COUGH, UNSPECIFIED TYPE: ICD-10-CM

## 2023-08-25 DIAGNOSIS — B34.9 VIRAL SYNDROME: Primary | ICD-10-CM

## 2023-08-25 DIAGNOSIS — J02.9 SORE THROAT: ICD-10-CM

## 2023-08-25 DIAGNOSIS — J04.0 LARYNGITIS: ICD-10-CM

## 2023-08-25 LAB
CTP QC/QA: YES
CTP QC/QA: YES
MOLECULAR STREP A: NEGATIVE
SARS-COV-2 AG RESP QL IA.RAPID: NEGATIVE

## 2023-08-25 PROCEDURE — 87651 STREP A DNA AMP PROBE: CPT | Mod: QW,S$GLB,, | Performed by: PHYSICIAN ASSISTANT

## 2023-08-25 PROCEDURE — 99213 OFFICE O/P EST LOW 20 MIN: CPT | Mod: S$GLB,,, | Performed by: PHYSICIAN ASSISTANT

## 2023-08-25 PROCEDURE — 87811 SARS-COV-2 COVID19 W/OPTIC: CPT | Mod: QW,S$GLB,, | Performed by: PHYSICIAN ASSISTANT

## 2023-08-25 PROCEDURE — 99213 PR OFFICE/OUTPT VISIT, EST, LEVL III, 20-29 MIN: ICD-10-PCS | Mod: S$GLB,,, | Performed by: PHYSICIAN ASSISTANT

## 2023-08-25 PROCEDURE — 87811 SARS CORONAVIRUS 2 ANTIGEN POCT, MANUAL READ: ICD-10-PCS | Mod: QW,S$GLB,, | Performed by: PHYSICIAN ASSISTANT

## 2023-08-25 PROCEDURE — 87651 POCT STREP A MOLECULAR: ICD-10-PCS | Mod: QW,S$GLB,, | Performed by: PHYSICIAN ASSISTANT

## 2023-08-25 RX ORDER — PROMETHAZINE HYDROCHLORIDE AND DEXTROMETHORPHAN HYDROBROMIDE 6.25; 15 MG/5ML; MG/5ML
5 SYRUP ORAL NIGHTLY PRN
Qty: 35 ML | Refills: 0 | Status: SHIPPED | OUTPATIENT
Start: 2023-08-25 | End: 2023-09-01

## 2023-08-25 RX ORDER — BROMPHENIRAMINE MALEATE, PSEUDOEPHEDRINE HYDROCHLORIDE, AND DEXTROMETHORPHAN HYDROBROMIDE 2; 30; 10 MG/5ML; MG/5ML; MG/5ML
10 SYRUP ORAL EVERY 6 HOURS PRN
Qty: 118 ML | Refills: 0 | Status: SHIPPED | OUTPATIENT
Start: 2023-08-25 | End: 2023-09-04

## 2023-08-25 RX ORDER — METHYLPREDNISOLONE 4 MG/1
TABLET ORAL
Qty: 21 EACH | Refills: 0 | Status: SHIPPED | OUTPATIENT
Start: 2023-08-25 | End: 2023-09-12

## 2023-08-25 NOTE — TELEPHONE ENCOUNTER
Spoke with pt's father. He said, pt has lost her voice. Informed him that Dr. Martinez is out of the office today and pt will need to be evaluated. Advised going to urgent care for eval and if any medicines need to be prescribed the provider can send it in. He verbalized understanding.

## 2023-08-25 NOTE — TELEPHONE ENCOUNTER
----- Message from Clasu Ndiaye sent at 8/25/2023  2:09 PM CDT -----  Contact: father [phone].  Calling requesting medication to be sent to local pharmacy for sore throat , and laryngitis . Please call back at [phone] or [phone]. [address] [address] [address] [facility]  [address] [address] [facility] [address]  Phone: [phone] Fax: [phone]

## 2023-08-25 NOTE — TELEPHONE ENCOUNTER
----- Message from Claus Ndiaye sent at 8/25/2023  2:09 PM CDT -----  Contact: father [phone].  Calling requesting medication to be sent to local pharmacy for sore throat , and laryngitis . Please call back at [phone] or [phone]. [address] [address] [address] [facility]  [address] [address] [facility] [address]  Phone: [phone] Fax: [phone]

## 2023-08-25 NOTE — TELEPHONE ENCOUNTER
Spoke to pt  as pt has lost her voice.  Advised we cannot issue medications without pt being seen and we do not have any same day openings.  Offered UC so pt could be evaluated and treated.  Pts  verbalized understanding and stated if pt has no improvement by next week they will reach out to clinic for f/u visit.

## 2023-08-25 NOTE — PROGRESS NOTES
Subjective:      Patient ID: Kenya Majano is a 63 y.o. female.    Vitals:  height is 5' (1.524 m) and weight is 69.9 kg (154 lb). Her tympanic temperature is 96.8 °F (36 °C). Her blood pressure is 142/65 (abnormal) and her pulse is 89. Her respiration is 18 and oxygen saturation is 93% (abnormal).     Chief Complaint: Sore Throat    63-year-old female presents today complaining worsening cough, headaches, sore throat, and fatigue that began yesterday.  Patient has COPD and does wear oxygen at night.  She complains of increased shortness a breath but states that only occurs when she comes in from being outside in the heat.  She denies fever, chills, nausea, vomiting, diarrhea, abdominal pain, chest pain, and/or any other symptoms associated with this complaint.    Sore Throat   This is a new problem. The current episode started yesterday. The problem has been gradually worsening. There has been no fever. The pain is at a severity of 6/10. The pain is moderate. Associated symptoms include coughing, headaches, a hoarse voice, swollen glands and trouble swallowing. Pertinent negatives include no abdominal pain, congestion, diarrhea, drooling, ear discharge, ear pain, neck pain, shortness of breath, stridor or vomiting. She has tried oral narcotic analgesics for the symptoms. The treatment provided mild relief.       HENT:  Positive for sore throat and trouble swallowing. Negative for ear pain, ear discharge, drooling and congestion.    Neck: Negative for neck pain.   Respiratory:  Positive for cough. Negative for shortness of breath and stridor.    Gastrointestinal:  Negative for abdominal pain, vomiting and diarrhea.   Neurological:  Positive for headaches.      Objective:     Physical Exam   Constitutional: She is oriented to person, place, and time. She appears well-developed. She is cooperative.  Non-toxic appearance. She does not appear ill. No distress.   HENT:   Head: Normocephalic and atraumatic.    Ears:   Right Ear: Hearing and external ear normal.   Left Ear: Hearing and external ear normal.   Nose: Nose normal.   Mouth/Throat: Uvula is midline and mucous membranes are normal. No trismus in the jaw. Normal dentition. No uvula swelling. Posterior oropharyngeal erythema present. No oropharyngeal exudate or posterior oropharyngeal edema.   Eyes: Conjunctivae and lids are normal. No scleral icterus.   Neck: Neck supple.   Cardiovascular: Normal rate, regular rhythm, normal heart sounds and normal pulses.   Pulmonary/Chest: Effort normal and breath sounds normal. No respiratory distress. She has no decreased breath sounds. She has no wheezes. She has no rhonchi.   Abdominal: Normal appearance.   Musculoskeletal: Normal range of motion.         General: Normal range of motion.   Neurological: She is alert and oriented to person, place, and time. She exhibits normal muscle tone.   Skin: Skin is warm, dry, intact, not diaphoretic and not pale.   Psychiatric: Her speech is normal and behavior is normal. Judgment and thought content normal.   Nursing note and vitals reviewed.      Assessment:     1. Viral syndrome    2. Sore throat    3. Cough, unspecified type    4. Laryngitis      Results for orders placed or performed in visit on 08/25/23   SARS Coronavirus 2 Antigen, POCT Manual Read   Result Value Ref Range    SARS Coronavirus 2 Antigen Negative Negative     Acceptable Yes    POCT Strep A, Molecular   Result Value Ref Range    Molecular Strep A, POC Negative Negative     Acceptable Yes          Plan:       Viral syndrome  -     methylPREDNISolone (MEDROL DOSEPACK) 4 mg tablet; use as directed  Dispense: 21 each; Refill: 0    Sore throat  -     POCT Strep A, Molecular  -     methylPREDNISolone (MEDROL DOSEPACK) 4 mg tablet; use as directed  Dispense: 21 each; Refill: 0    Cough, unspecified type  -     SARS Coronavirus 2 Antigen, POCT Manual Read  -     brompheniramine-pseudoeph-DM  (BROMFED DM) 2-30-10 mg/5 mL Syrp; Take 10 mLs by mouth every 6 (six) hours as needed (cough).  Dispense: 118 mL; Refill: 0  -     promethazine-dextromethorphan (PROMETHAZINE-DM) 6.25-15 mg/5 mL Syrp; Take 5 mLs by mouth nightly as needed (cough).  Dispense: 35 mL; Refill: 0    Laryngitis      Medical Decision Making:   Clinical Tests:   Lab Tests: Ordered and Reviewed       <> Summary of Lab: COVID negative  Strep negative

## 2023-08-28 ENCOUNTER — TELEPHONE (OUTPATIENT)
Dept: URGENT CARE | Facility: CLINIC | Age: 63
End: 2023-08-28
Payer: MEDICARE

## 2023-09-12 ENCOUNTER — OFFICE VISIT (OUTPATIENT)
Dept: INTERNAL MEDICINE | Facility: CLINIC | Age: 63
End: 2023-09-12
Payer: MEDICARE

## 2023-09-12 VITALS
OXYGEN SATURATION: 99 % | HEIGHT: 60 IN | WEIGHT: 153.44 LBS | RESPIRATION RATE: 18 BRPM | BODY MASS INDEX: 30.12 KG/M2 | TEMPERATURE: 99 F | DIASTOLIC BLOOD PRESSURE: 68 MMHG | SYSTOLIC BLOOD PRESSURE: 104 MMHG | HEART RATE: 93 BPM

## 2023-09-12 DIAGNOSIS — J32.9 SINUSITIS, UNSPECIFIED CHRONICITY, UNSPECIFIED LOCATION: Primary | ICD-10-CM

## 2023-09-12 DIAGNOSIS — R50.9 FEVER, UNSPECIFIED FEVER CAUSE: ICD-10-CM

## 2023-09-12 DIAGNOSIS — R05.9 COUGH, UNSPECIFIED TYPE: ICD-10-CM

## 2023-09-12 DIAGNOSIS — J44.1 CHRONIC OBSTRUCTIVE PULMONARY DISEASE WITH ACUTE EXACERBATION: ICD-10-CM

## 2023-09-12 LAB
CTP QC/QA: YES
MOLECULAR STREP A: NEGATIVE
POC MOLECULAR INFLUENZA A AGN: NEGATIVE
POC MOLECULAR INFLUENZA B AGN: NEGATIVE
SARS-COV-2 RDRP RESP QL NAA+PROBE: NEGATIVE

## 2023-09-12 PROCEDURE — 99214 PR OFFICE/OUTPT VISIT, EST, LEVL IV, 30-39 MIN: ICD-10-PCS | Mod: 25,S$GLB,, | Performed by: NURSE PRACTITIONER

## 2023-09-12 PROCEDURE — 4010F ACE/ARB THERAPY RXD/TAKEN: CPT | Mod: CPTII,S$GLB,, | Performed by: NURSE PRACTITIONER

## 2023-09-12 PROCEDURE — 87651 POCT STREP A MOLECULAR: ICD-10-PCS | Mod: QW,S$GLB,, | Performed by: NURSE PRACTITIONER

## 2023-09-12 PROCEDURE — 4010F PR ACE/ARB THEARPY RXD/TAKEN: ICD-10-PCS | Mod: CPTII,S$GLB,, | Performed by: NURSE PRACTITIONER

## 2023-09-12 PROCEDURE — 99999 PR PBB SHADOW E&M-EST. PATIENT-LVL V: CPT | Mod: PBBFAC,,, | Performed by: NURSE PRACTITIONER

## 2023-09-12 PROCEDURE — 3078F DIAST BP <80 MM HG: CPT | Mod: CPTII,S$GLB,, | Performed by: NURSE PRACTITIONER

## 2023-09-12 PROCEDURE — 99214 OFFICE O/P EST MOD 30 MIN: CPT | Mod: 25,S$GLB,, | Performed by: NURSE PRACTITIONER

## 2023-09-12 PROCEDURE — 3078F PR MOST RECENT DIASTOLIC BLOOD PRESSURE < 80 MM HG: ICD-10-PCS | Mod: CPTII,S$GLB,, | Performed by: NURSE PRACTITIONER

## 2023-09-12 PROCEDURE — 3008F PR BODY MASS INDEX (BMI) DOCUMENTED: ICD-10-PCS | Mod: CPTII,S$GLB,, | Performed by: NURSE PRACTITIONER

## 2023-09-12 PROCEDURE — 96372 PR INJECTION,THERAP/PROPH/DIAG2ST, IM OR SUBCUT: ICD-10-PCS | Mod: S$GLB,,, | Performed by: NURSE PRACTITIONER

## 2023-09-12 PROCEDURE — 1160F RVW MEDS BY RX/DR IN RCRD: CPT | Mod: CPTII,S$GLB,, | Performed by: NURSE PRACTITIONER

## 2023-09-12 PROCEDURE — 87502 INFLUENZA DNA AMP PROBE: CPT | Mod: QW,S$GLB,, | Performed by: NURSE PRACTITIONER

## 2023-09-12 PROCEDURE — 87651 STREP A DNA AMP PROBE: CPT | Mod: QW,S$GLB,, | Performed by: NURSE PRACTITIONER

## 2023-09-12 PROCEDURE — 1159F PR MEDICATION LIST DOCUMENTED IN MEDICAL RECORD: ICD-10-PCS | Mod: CPTII,S$GLB,, | Performed by: NURSE PRACTITIONER

## 2023-09-12 PROCEDURE — 87635: ICD-10-PCS | Mod: QW,S$GLB,, | Performed by: NURSE PRACTITIONER

## 2023-09-12 PROCEDURE — 96372 THER/PROPH/DIAG INJ SC/IM: CPT | Mod: S$GLB,,, | Performed by: NURSE PRACTITIONER

## 2023-09-12 PROCEDURE — 3074F SYST BP LT 130 MM HG: CPT | Mod: CPTII,S$GLB,, | Performed by: NURSE PRACTITIONER

## 2023-09-12 PROCEDURE — 1159F MED LIST DOCD IN RCRD: CPT | Mod: CPTII,S$GLB,, | Performed by: NURSE PRACTITIONER

## 2023-09-12 PROCEDURE — 87635 SARS-COV-2 COVID-19 AMP PRB: CPT | Mod: QW,S$GLB,, | Performed by: NURSE PRACTITIONER

## 2023-09-12 PROCEDURE — 3074F PR MOST RECENT SYSTOLIC BLOOD PRESSURE < 130 MM HG: ICD-10-PCS | Mod: CPTII,S$GLB,, | Performed by: NURSE PRACTITIONER

## 2023-09-12 PROCEDURE — 99999 PR PBB SHADOW E&M-EST. PATIENT-LVL V: ICD-10-PCS | Mod: PBBFAC,,, | Performed by: NURSE PRACTITIONER

## 2023-09-12 PROCEDURE — 1160F PR REVIEW ALL MEDS BY PRESCRIBER/CLIN PHARMACIST DOCUMENTED: ICD-10-PCS | Mod: CPTII,S$GLB,, | Performed by: NURSE PRACTITIONER

## 2023-09-12 PROCEDURE — 3008F BODY MASS INDEX DOCD: CPT | Mod: CPTII,S$GLB,, | Performed by: NURSE PRACTITIONER

## 2023-09-12 PROCEDURE — 87502 POCT INFLUENZA A/B MOLECULAR: ICD-10-PCS | Mod: QW,S$GLB,, | Performed by: NURSE PRACTITIONER

## 2023-09-12 RX ORDER — VERAPAMIL HYDROCHLORIDE 120 MG/1
120 TABLET, FILM COATED, EXTENDED RELEASE ORAL
COMMUNITY
Start: 2023-09-02

## 2023-09-12 RX ORDER — MONTELUKAST SODIUM 10 MG/1
10 TABLET ORAL
COMMUNITY
Start: 2023-09-08 | End: 2023-11-02 | Stop reason: SDUPTHER

## 2023-09-12 RX ORDER — METHYLPREDNISOLONE ACETATE 80 MG/ML
80 INJECTION, SUSPENSION INTRA-ARTICULAR; INTRALESIONAL; INTRAMUSCULAR; SOFT TISSUE
Status: COMPLETED | OUTPATIENT
Start: 2023-09-12 | End: 2023-09-12

## 2023-09-12 RX ORDER — PROMETHAZINE HYDROCHLORIDE AND DEXTROMETHORPHAN HYDROBROMIDE 6.25; 15 MG/5ML; MG/5ML
5 SYRUP ORAL NIGHTLY PRN
Qty: 120 ML | Refills: 0 | Status: SHIPPED | OUTPATIENT
Start: 2023-09-12

## 2023-09-12 RX ORDER — AMOXICILLIN AND CLAVULANATE POTASSIUM 875; 125 MG/1; MG/1
1 TABLET, FILM COATED ORAL 2 TIMES DAILY
Qty: 20 TABLET | Refills: 0 | Status: SHIPPED | OUTPATIENT
Start: 2023-09-12 | End: 2023-09-22

## 2023-09-12 RX ADMIN — METHYLPREDNISOLONE ACETATE 80 MG: 80 INJECTION, SUSPENSION INTRA-ARTICULAR; INTRALESIONAL; INTRAMUSCULAR; SOFT TISSUE at 11:09

## 2023-09-12 NOTE — PROGRESS NOTES
Subjective:       Patient ID: Kenya Majano is a 63 y.o. female.    Chief Complaint: Fatigue, Nasal Congestion, and Sore Throat    Patient here with cough, congestion, aches  Started 2 weeks ago; went to Urgent Care and was given rx syrup, medrol pack, all symptoms resolved and she was totally fine for a few days  Has recent exposure to strep throat and Upper Respiratory Infection  (step son)  2 days ago all symptoms returned with fatigue, aches, low grade fever, congestion and cough  Has hoarse voice  Using chloraseptic spray, mucinex    Sore Throat   This is a recurrent problem. The current episode started yesterday. The problem has been waxing and waning. Neither side of throat is experiencing more pain than the other. The fever has been present for Less than 1 day. The pain is at a severity of 6/10. The pain is moderate. Associated symptoms include congestion, coughing, diarrhea, drooling, headaches, a hoarse voice, neck pain, shortness of breath, stridor and trouble swallowing. Pertinent negatives include no abdominal pain, ear discharge, ear pain, plugged ear sensation, swollen glands or vomiting. She has had exposure to strep. She has had no exposure to mono. She has tried NSAIDs, cool liquids and gargles for the symptoms. The treatment provided mild relief.       /68 (BP Location: Left arm, Patient Position: Sitting, BP Method: Large (Manual))   Pulse 93   Temp 98.6 °F (37 °C) (Tympanic)   Resp 18   Ht 5' (1.524 m)   Wt 69.6 kg (153 lb 7 oz)   LMP  (LMP Unknown)   SpO2 99%   BMI 29.97 kg/m²     Review of Systems   Constitutional:  Positive for fatigue.   HENT:  Positive for congestion, drooling, hoarse voice, sore throat and trouble swallowing. Negative for ear discharge and ear pain.    Respiratory:  Positive for cough, shortness of breath and stridor.    Gastrointestinal:  Positive for diarrhea. Negative for abdominal pain and vomiting.   Musculoskeletal:  Positive for neck pain.    Neurological:  Positive for headaches.       Objective:      Physical Exam  Vitals and nursing note reviewed.   Constitutional:       General: She is not in acute distress.     Appearance: Normal appearance. She is well-developed. She is not diaphoretic.   HENT:      Head: Normocephalic and atraumatic.      Right Ear: Tympanic membrane, ear canal and external ear normal.      Left Ear: Tympanic membrane, ear canal and external ear normal.      Nose: Mucosal edema and rhinorrhea present.      Right Sinus: No maxillary sinus tenderness or frontal sinus tenderness.      Left Sinus: No maxillary sinus tenderness or frontal sinus tenderness.      Mouth/Throat:      Pharynx: Uvula midline. No oropharyngeal exudate or posterior oropharyngeal erythema.   Eyes:      General:         Right eye: No discharge.         Left eye: No discharge.      Conjunctiva/sclera: Conjunctivae normal.   Cardiovascular:      Rate and Rhythm: Normal rate and regular rhythm.      Heart sounds: Normal heart sounds. No murmur heard.  Pulmonary:      Effort: Pulmonary effort is normal. No accessory muscle usage or respiratory distress.      Breath sounds: Rhonchi present. No decreased breath sounds, wheezing or rales.      Comments: Home oxygen per [place]  Chest:      Chest wall: No tenderness.   Abdominal:      General: There is no distension.      Palpations: Abdomen is soft.   Musculoskeletal:         General: Normal range of motion.      Cervical back: Normal range of motion.   Skin:     General: Skin is warm and dry.      Findings: No rash.   Neurological:      Mental Status: She is alert and oriented to person, place, and time.   Psychiatric:         Mood and Affect: Mood normal.         Behavior: Behavior normal. Behavior is cooperative.         Thought Content: Thought content normal.         Judgment: Judgment normal.         Assessment:       1. Sinusitis, unspecified chronicity, unspecified location    2. Chronic obstructive pulmonary  disease with acute exacerbation    3. Fever, unspecified fever cause    4. Cough, unspecified type        Plan:       Kenya was seen today for fatigue, nasal congestion and sore throat.    Diagnoses and all orders for this visit:    Sinusitis, unspecified chronicity, unspecified location  -     amoxicillin-clavulanate 875-125mg (AUGMENTIN) 875-125 mg per tablet; Take 1 tablet by mouth 2 (two) times daily. for 10 days    Chronic obstructive pulmonary disease with acute exacerbation    Fever, unspecified fever cause  -     POCT Influenza A/B Molecular  -     POCT COVID-19 Rapid Screening  -     POCT Strep A, Molecular    Cough, unspecified type  -     promethazine-dextromethorphan (PROMETHAZINE-DM) 6.25-15 mg/5 mL Syrp; Take 5 mLs by mouth nightly as needed (Cough).    Other orders  -     methylPREDNISolone acetate injection 80 mg      Flu, covid and strep Negative  IM steroid per patient request  Antibiotics as above  Neb treatments prn  Refill cough syrup  Supportive care discussed  Continue home o2

## 2023-09-25 DIAGNOSIS — F41.1 GAD (GENERALIZED ANXIETY DISORDER): ICD-10-CM

## 2023-09-25 DIAGNOSIS — G47.09 OTHER INSOMNIA: ICD-10-CM

## 2023-09-26 RX ORDER — CLONAZEPAM 1 MG/1
1 TABLET ORAL 2 TIMES DAILY
Qty: 60 TABLET | Refills: 5 | Status: SHIPPED | OUTPATIENT
Start: 2023-09-26 | End: 2023-11-02 | Stop reason: SDUPTHER

## 2023-09-26 NOTE — TELEPHONE ENCOUNTER
Controlled Substances Review  [place] Prescription Drug Monitoring -   site accessed.  Patient profile reviewed:  Patient is not a hospice patient.  Overall utilization and medication profile reviewed in context of listed medical problems.  Non narcotic/controlled substances use reviewed on patient's chart and  website  Review of controlled substances from other providers..

## 2023-09-27 ENCOUNTER — TELEPHONE (OUTPATIENT)
Dept: PULMONOLOGY | Facility: CLINIC | Age: 63
End: 2023-09-27
Payer: MEDICARE

## 2023-09-27 NOTE — TELEPHONE ENCOUNTER
Chronic Disease Management:   Called patient to confirm Pulmonary Disease Management appointment.   Rescheduled visit at patient's request.

## 2023-10-02 DIAGNOSIS — K21.9 LARYNGOPHARYNGEAL REFLUX (LPR): ICD-10-CM

## 2023-10-02 RX ORDER — FAMOTIDINE 20 MG/1
20 TABLET, FILM COATED ORAL 2 TIMES DAILY
Qty: 60 TABLET | Refills: 0 | Status: SHIPPED | OUTPATIENT
Start: 2023-10-02 | End: 2023-10-31

## 2023-10-31 DIAGNOSIS — K21.9 LARYNGOPHARYNGEAL REFLUX (LPR): ICD-10-CM

## 2023-10-31 RX ORDER — FAMOTIDINE 20 MG/1
20 TABLET, FILM COATED ORAL 2 TIMES DAILY
Qty: 60 TABLET | Refills: 0 | Status: SHIPPED | OUTPATIENT
Start: 2023-10-31 | End: 2024-01-17 | Stop reason: SDUPTHER

## 2023-11-01 ENCOUNTER — TELEPHONE (OUTPATIENT)
Dept: PULMONOLOGY | Facility: CLINIC | Age: 63
End: 2023-11-01
Payer: MEDICARE

## 2023-11-01 NOTE — TELEPHONE ENCOUNTER
----- Message from Jessica Reed MD sent at 6/7/2018  5:40 AM CDT -----  Low prograf level with wide fluctuations. Pl check compliance with med intake and timing of blood draw.   Chronic Disease Management:   Called patient to confirm Pulmonary Disease Management appointment. Patient confirmed. Advised patient to bring respiratory inhalers to visit.

## 2023-11-02 ENCOUNTER — CLINICAL SUPPORT (OUTPATIENT)
Dept: PULMONOLOGY | Facility: CLINIC | Age: 63
End: 2023-11-02
Payer: MEDICARE

## 2023-11-02 ENCOUNTER — OFFICE VISIT (OUTPATIENT)
Dept: PULMONOLOGY | Facility: CLINIC | Age: 63
End: 2023-11-02
Payer: MEDICARE

## 2023-11-02 VITALS
SYSTOLIC BLOOD PRESSURE: 98 MMHG | BODY MASS INDEX: 30.38 KG/M2 | WEIGHT: 154.75 LBS | OXYGEN SATURATION: 98 % | DIASTOLIC BLOOD PRESSURE: 53 MMHG | WEIGHT: 154.75 LBS | HEART RATE: 86 BPM | HEIGHT: 60 IN | HEIGHT: 60 IN | BODY MASS INDEX: 30.38 KG/M2 | RESPIRATION RATE: 18 BRPM

## 2023-11-02 VITALS
OXYGEN SATURATION: 96 % | BODY MASS INDEX: 30.26 KG/M2 | WEIGHT: 154.13 LBS | HEIGHT: 60 IN | RESPIRATION RATE: 18 BRPM | HEART RATE: 87 BPM

## 2023-11-02 DIAGNOSIS — J44.1 COPD EXACERBATION: Primary | ICD-10-CM

## 2023-11-02 DIAGNOSIS — J41.8 MIXED SIMPLE AND MUCOPURULENT CHRONIC BRONCHITIS: ICD-10-CM

## 2023-11-02 DIAGNOSIS — J44.89 ASTHMA WITH COPD: ICD-10-CM

## 2023-11-02 DIAGNOSIS — G47.09 OTHER INSOMNIA: ICD-10-CM

## 2023-11-02 DIAGNOSIS — J47.9 ADULT BRONCHIECTASIS: Chronic | ICD-10-CM

## 2023-11-02 DIAGNOSIS — J44.9 CHRONIC OBSTRUCTIVE PULMONARY DISEASE, UNSPECIFIED COPD TYPE: ICD-10-CM

## 2023-11-02 DIAGNOSIS — J44.9 CHRONIC OBSTRUCTIVE PULMONARY DISEASE, UNSPECIFIED COPD TYPE: Primary | ICD-10-CM

## 2023-11-02 DIAGNOSIS — F41.1 GAD (GENERALIZED ANXIETY DISORDER): ICD-10-CM

## 2023-11-02 DIAGNOSIS — J41.1 MUCOPURULENT CHRONIC BRONCHITIS: ICD-10-CM

## 2023-11-02 PROCEDURE — 94618 PULMONARY STRESS TESTING: ICD-10-PCS | Mod: S$GLB,,, | Performed by: INTERNAL MEDICINE

## 2023-11-02 PROCEDURE — G0008 FLU VACCINE (QUAD) GREATER THAN OR EQUAL TO 3YO PRESERVATIVE FREE IM: ICD-10-PCS | Mod: S$GLB,,, | Performed by: INTERNAL MEDICINE

## 2023-11-02 PROCEDURE — 90686 IIV4 VACC NO PRSV 0.5 ML IM: CPT | Mod: S$GLB,,, | Performed by: INTERNAL MEDICINE

## 2023-11-02 PROCEDURE — 4010F PR ACE/ARB THEARPY RXD/TAKEN: ICD-10-PCS | Mod: CPTII,S$GLB,, | Performed by: INTERNAL MEDICINE

## 2023-11-02 PROCEDURE — 1159F PR MEDICATION LIST DOCUMENTED IN MEDICAL RECORD: ICD-10-PCS | Mod: CPTII,S$GLB,, | Performed by: INTERNAL MEDICINE

## 2023-11-02 PROCEDURE — 90686 FLU VACCINE (QUAD) GREATER THAN OR EQUAL TO 3YO PRESERVATIVE FREE IM: ICD-10-PCS | Mod: S$GLB,,, | Performed by: INTERNAL MEDICINE

## 2023-11-02 PROCEDURE — 99215 OFFICE O/P EST HI 40 MIN: CPT | Mod: 25,S$GLB,, | Performed by: INTERNAL MEDICINE

## 2023-11-02 PROCEDURE — 99999 PR PBB SHADOW E&M-EST. PATIENT-LVL IV: ICD-10-PCS | Mod: PBBFAC,,,

## 2023-11-02 PROCEDURE — 99999 PR PBB SHADOW E&M-EST. PATIENT-LVL V: CPT | Mod: PBBFAC,,, | Performed by: INTERNAL MEDICINE

## 2023-11-02 PROCEDURE — 1159F MED LIST DOCD IN RCRD: CPT | Mod: CPTII,S$GLB,, | Performed by: INTERNAL MEDICINE

## 2023-11-02 PROCEDURE — 3078F DIAST BP <80 MM HG: CPT | Mod: CPTII,S$GLB,, | Performed by: INTERNAL MEDICINE

## 2023-11-02 PROCEDURE — 1160F PR REVIEW ALL MEDS BY PRESCRIBER/CLIN PHARMACIST DOCUMENTED: ICD-10-PCS | Mod: CPTII,S$GLB,, | Performed by: INTERNAL MEDICINE

## 2023-11-02 PROCEDURE — 99215 PR OFFICE/OUTPT VISIT, EST, LEVL V, 40-54 MIN: ICD-10-PCS | Mod: 25,S$GLB,, | Performed by: INTERNAL MEDICINE

## 2023-11-02 PROCEDURE — 3008F PR BODY MASS INDEX (BMI) DOCUMENTED: ICD-10-PCS | Mod: CPTII,S$GLB,, | Performed by: INTERNAL MEDICINE

## 2023-11-02 PROCEDURE — 3078F PR MOST RECENT DIASTOLIC BLOOD PRESSURE < 80 MM HG: ICD-10-PCS | Mod: CPTII,S$GLB,, | Performed by: INTERNAL MEDICINE

## 2023-11-02 PROCEDURE — 99999 PR PBB SHADOW E&M-EST. PATIENT-LVL IV: CPT | Mod: PBBFAC,,,

## 2023-11-02 PROCEDURE — 99999 PR PBB SHADOW E&M-EST. PATIENT-LVL V: ICD-10-PCS | Mod: PBBFAC,,, | Performed by: INTERNAL MEDICINE

## 2023-11-02 PROCEDURE — 3074F SYST BP LT 130 MM HG: CPT | Mod: CPTII,S$GLB,, | Performed by: INTERNAL MEDICINE

## 2023-11-02 PROCEDURE — 99999 PR PBB SHADOW E&M-EST. PATIENT-LVL I: CPT | Mod: PBBFAC,,,

## 2023-11-02 PROCEDURE — 3008F BODY MASS INDEX DOCD: CPT | Mod: CPTII,S$GLB,, | Performed by: INTERNAL MEDICINE

## 2023-11-02 PROCEDURE — G0008 ADMIN INFLUENZA VIRUS VAC: HCPCS | Mod: S$GLB,,, | Performed by: INTERNAL MEDICINE

## 2023-11-02 PROCEDURE — 1160F RVW MEDS BY RX/DR IN RCRD: CPT | Mod: CPTII,S$GLB,, | Performed by: INTERNAL MEDICINE

## 2023-11-02 PROCEDURE — 4010F ACE/ARB THERAPY RXD/TAKEN: CPT | Mod: CPTII,S$GLB,, | Performed by: INTERNAL MEDICINE

## 2023-11-02 PROCEDURE — 99999 PR PBB SHADOW E&M-EST. PATIENT-LVL I: ICD-10-PCS | Mod: PBBFAC,,,

## 2023-11-02 PROCEDURE — 3074F PR MOST RECENT SYSTOLIC BLOOD PRESSURE < 130 MM HG: ICD-10-PCS | Mod: CPTII,S$GLB,, | Performed by: INTERNAL MEDICINE

## 2023-11-02 PROCEDURE — 94618 PULMONARY STRESS TESTING: CPT | Mod: S$GLB,,, | Performed by: INTERNAL MEDICINE

## 2023-11-02 RX ORDER — PREDNISONE 20 MG/1
TABLET ORAL
Qty: 20 TABLET | Refills: 0 | Status: SHIPPED | OUTPATIENT
Start: 2023-11-02

## 2023-11-02 RX ORDER — AZITHROMYCIN 250 MG/1
TABLET, FILM COATED ORAL
Qty: 6 TABLET | Refills: 0 | Status: SHIPPED | OUTPATIENT
Start: 2023-11-02 | End: 2024-01-17

## 2023-11-02 RX ORDER — MONTELUKAST SODIUM 10 MG/1
10 TABLET ORAL NIGHTLY
Qty: 90 TABLET | Refills: 3 | Status: SHIPPED | OUTPATIENT
Start: 2023-11-02

## 2023-11-02 RX ORDER — CLONAZEPAM 1 MG/1
1 TABLET ORAL 2 TIMES DAILY
Qty: 60 TABLET | Refills: 5 | Status: SHIPPED | OUTPATIENT
Start: 2023-11-02 | End: 2024-11-01

## 2023-11-02 RX ORDER — SODIUM CHLORIDE FOR INHALATION 0.9 %
3 VIAL, NEBULIZER (ML) INHALATION
Qty: 300 ML | Refills: 11 | Status: SHIPPED | OUTPATIENT
Start: 2023-11-02

## 2023-11-02 RX ORDER — IPRATROPIUM BROMIDE AND ALBUTEROL SULFATE 2.5; .5 MG/3ML; MG/3ML
3 SOLUTION RESPIRATORY (INHALATION) EVERY 6 HOURS PRN
Qty: 300 ML | Refills: 11 | Status: SHIPPED | OUTPATIENT
Start: 2023-11-02 | End: 2024-11-01

## 2023-11-02 RX ORDER — FLUTICASONE FUROATE, UMECLIDINIUM BROMIDE AND VILANTEROL TRIFENATATE 200; 62.5; 25 UG/1; UG/1; UG/1
1 POWDER RESPIRATORY (INHALATION) DAILY
Qty: 60 EACH | Refills: 6 | Status: SHIPPED | OUTPATIENT
Start: 2023-11-02

## 2023-11-02 NOTE — PROCEDURES
[facility] 3rdFl  Six Minute Walk     SUMMARY   Ordering Provider: Álvaro Sanchez MD   Interpreting Provider: Álvaro Sanchez MD  Performing nurse/tech/RT: VT, RT  Diagnosis: COPD  Height: 5' (152.4 cm)  Weight: 70.2 kg (154 lb 12.2 oz)  BMI (Calculated): 30.2   Patient Race:    Phase Oxygen Assessment Supplemental O2 Heart   Rate Blood Pressure Mookie Dyspnea Scale Rating   Resting 97 % Room Air 81 bpm 139/65 0   Exercise        Minute        1 98 % Room Air 88 bpm     2 99 % Room Air 106 bpm     3 97 % Room Air 108 bpm     4 95 % Room Air 115 bpm     5 96 % Room Air 110 bpm     6  98 % Room Air 112 bpm 144/60 4   Recovery        Minute        1 99 % Room Air 99 bpm     2 97 % Room Air 88 bpm     3 98 % Room Air 87 bpm     4 98 % Room Air 86 bpm 98/53 1     Six Minute Walk Summary  6MWT Status: completed without stopping  Patient Reported: Dyspnea, Other (Comment) (hip and leg pain)     Interpretation:  Did the patient stop or pause?: No                                         Total Time Walked (Calculated): 360 seconds  Final Partial Lap Distance (feet): 0 feet  Total Distance Meters (Calculated): 365.76 meters  Predicted Distance Meters (Calculated): 457.89 meters  Percentage of Predicted (Calculated): 79.88  Peak VO2 (Calculated): 14.95  Mets: 4.27  Has The Patient Had a Previous Six Minute Walk Test?: Yes       Previous 6MWT Results  Has The Patient Had a Previous Six Minute Walk Test?: Yes  Date of Previous Test: 02/23/23  Total Time Walked: 360 seconds  Total Distance (meters): 365.76  Predicted Distance (meters): 478.78 meters  Percentage of Predicted: 76.39  Percent Change (Calculated): 0      Interpretation:  Total distance walked in six minutes is mildly reduced indicating a reduction in overall  functional capacity. The patient did not meet criteria for supplemental oxygen prescription.  Clinical correlation suggested.  [] Mild exercise-induced hypoxemia described as an  arterial oxygen saturation of 93-95% (with a fall of 3-4% with exercise),   [] Moderate exercise-induced hypoxemia as a fall in oxygen saturation to  89-93% (with a fall of 3-4 % with exercise)  [] Severe exercise induced hypoxemia as < 89% O2 saturation (88% and below).  Medicare Criteria for Oxygen prescription comments: When arterial oxygen saturation is at or below 88% during exercise (severe exercise induced hypoxemia) then the patient falls under   Details about Medicare Group Criteria coverage can be found at http://[email or web address]/manuals/[email or web address]     Álvaro Sanchez MD

## 2023-11-02 NOTE — PROGRESS NOTES
Subjective:       Patient ID: Kenya Majano is a 63 y.o. female.    Chief Complaint: Follow up for Chronic Obstructive Pulmonary Disease         HPI COPD  She presents for evaluation and treatment of COPD. The patient is currently having symptoms / an exacerbation. Current symptoms include chronic dyspnea, dyspnea after 1/2 blocks, non-productive cough, and wheezing. Symptoms have been present since several months ago and have been gradually worsening. She denies weight loss, chills, and fever. Associated symptoms include fatigue, poor exercise tolerance, and shortness of breath.  This episode appears to have been triggered by no identifiable factor. Treatments tried for the current exacerbation: albuterol nebulizer and inhaled steroid. The patient has been having similar episodes for approximately 2 months. She uses 1 pillows at night. Patient currently is not on home oxygen therapy.. The patient is having no constitutional symptoms, denying fever, chills, anorexia, or weight loss. The patient has been hospitalized for this condition before. She has a history of 41 pack years. The patient is experiencing exercise intolerance (difficulty walking 1 blocks on flat ground).  Hx of asthma  Cough in 2022 started - now worse  Stopped smoking in 2023    Jet nebsd every 4 hours  Follow up from hospitalization  2023 Hospitalized  2023 Hospitalized   Now symptoms are recurring  Past Medical History:   Diagnosis Date    Adult bronchiectasis 2023    Anxiety     Arthritis     Asthma     Cataract     Coronary artery disease     Emphysema of lung     Glaucoma     Hypertension     Seizures     Stroke      Past Surgical History:   Procedure Laterality Date    APPENDECTOMY       SECTION      CHOLECYSTECTOMY      EYE SURGERY      HYSTERECTOMY      around     OOPHORECTOMY      around      Social History     Socioeconomic History    Marital status:     Number of children: 3   Tobacco Use     Smoking status: Former     Current packs/day: 0.00     Average packs/day: 1 pack/day for 48.0 years (48.0 ttl pk-yrs)     Types: Cigarettes     Start date: 1975     Quit date: 2023     Years since quittin.8     Passive exposure: Never    Smokeless tobacco: Never   Substance and Sexual Activity    Alcohol use: Never    Drug use: Never    Sexual activity: Yes     Partners: Male     @FAM@  Review of Systems   Constitutional:  Positive for fatigue. Negative for fever.   HENT:  Positive for postnasal drip, rhinorrhea and congestion.    Eyes:  Negative for redness and itching.   Respiratory:  Positive for cough, sputum production, shortness of breath, dyspnea on extertion, use of rescue inhaler and Paroxysmal Nocturnal Dyspnea.    Cardiovascular:  Negative for chest pain, palpitations and leg swelling.   Genitourinary:  Negative for difficulty urinating and hematuria.   Endocrine:  Negative for cold intolerance and heat intolerance.    Skin:  Negative for rash.   Gastrointestinal:  Negative for nausea and abdominal pain.   Neurological:  Negative for dizziness, syncope, weakness and light-headedness.   Hematological:  Negative for adenopathy. Does not bruise/bleed easily.   Psychiatric/Behavioral:  Negative for sleep disturbance. The patient is not nervous/anxious.        Objective:      BP (!) 98/53   Pulse 86   Resp 18   Ht 5' (1.524 m)   Wt 70.2 kg (154 lb 12.2 oz)   LMP  (LMP Unknown)   SpO2 98%   BMI 30.23 kg/m²   Physical Exam  Vitals and nursing note reviewed.   Constitutional:       Appearance: She is well-developed.   HENT:      Head: Normocephalic and atraumatic.      Nose: Congestion and rhinorrhea present.      Mouth/Throat:      Pharynx: No oropharyngeal exudate.   Eyes:      Conjunctiva/sclera: Conjunctivae normal.      Pupils: Pupils are equal, round, and reactive to light.   Neck:      Thyroid: No thyromegaly.      Vascular: No JVD.      Trachea: No tracheal deviation.    Cardiovascular:      Rate and Rhythm: Normal rate and regular rhythm.      Heart sounds: Normal heart sounds.   Pulmonary:      Effort: Pulmonary effort is normal. No respiratory distress.      Breath sounds: Examination of the right-lower field reveals wheezing. Examination of the left-lower field reveals wheezing. Decreased breath sounds and wheezing present. No rhonchi or rales.   Chest:      Chest wall: No tenderness.   Abdominal:      General: Bowel sounds are normal.      Palpations: Abdomen is soft.   Musculoskeletal:         General: Normal range of motion.      Cervical back: Neck supple.   Lymphadenopathy:      Cervical: No cervical adenopathy.   Skin:     General: Skin is warm and dry.   Neurological:      Mental Status: She is alert and oriented to person, place, and time.     Clinical Guide to Interpretation or FeNO Levels :     FeNO  (ppb) LOW INTERMEDIATE HIGH   ADULT VALUES < 25 25-50          > 50   Th2-driven Inflammation Unlikely Likely Significant      Patients FeNO level at this visit : 96 (ppb)     Interpretation of FeNO measurement in adults:  [] FENO is less than 25 ppb implies non eosinophilic airway inflammation or the absence of airway inflammation.               Comment: Low FENO (<25 ppb) in adult asthmatics with persistent symptoms suggests other etiologies for these symptoms and a lower likelihood of benefit from adding or increasing inhaled glucocorticoids.     [] FENO between 25 and 50 ppb in adults should be interpreted cautiously with reference to the clinical situation (eg, symptomatic, on or off therapy, current smoking).     [x] FENO greater than 50 ppb in adults  suggests eosinophilic airway inflammation              Comment: High FENO (>50 ppb) in adult asthmatics even with atypical symptoms suggests glucocorticoid responsiveness. High FENO (>50 ppb) can help identify poor adherence or uncontrolled inflammation in asthma patients with otherwise seemingly controlled  asthma.     Discussion:  A FENO less than 25 ppb in adults and less than 20 ppb in children younger than 12 years of age implies noneosinophilic airway inflammation or the absence of airway inflammation.  A FENO greater than 50 ppb in adults or greater than 35 ppb in children suggests eosinophilic airway inflammation.   Values of FENO between 25 and 50 ppb in adults (20 to 35 ppb in children) should be interpreted cautiously with reference to the clinical situation (eg, symptomatic, on or off therapy, current smoking).  A rising FENO with a greater than 20 percent change and more than 25 ppb (20 ppb in children) from a previously stable level suggests increasing eosinophilic airway inflammation, but there are wide inter-individual differences.  A decrease in FENO greater than 20 percent for values over 50 ppb or more than 10 ppb for values less than 50 ppb may be clinically important.  ?FENO in other respiratory diseases - Several other diseases are associated with altered levels of exhaled NO: low levels of FENO have been noted in cystic fibrosis, current smoking, pulmonary hypertension, hypothermia, primary ciliary dyskinesia, and bronchopulmonary dysplasia. Elevated FENO has been noted in atopy, nonasthmatic eosinophilic bronchitis, COPD exacerbations, noncystic fibrosis bronchiectasis, and viral upper respiratory infections.  Personal Diagnostic Review  Chest X-Ray: I personally reviewed the films and findings are:, air trapping/emphysema  CT Chest Lung Screening Low Dose  Narrative: EXAMINATION:  CT CHEST LUNG SCREENING LOW DOSE    CLINICAL HISTORY:  Lung cancer screening, >= 30 pk-yr current smoker (Age 55-80y); Encounter for screening for malignant neoplasm of respiratory organs    TECHNIQUE:  CT of the thorax was performed with low dose, lung screening protocol.  No contrast was administered.  Sagittal and coronal reconstructions were obtained.    COMPARISON:  01/05/2023    FINDINGS:  Lungs: Stable bilateral  calcified and noncalcified pulmonary nodules.  Emphysema.    Pleura:   No effusion.    Heart and pericardium: Normal size without effusion.    Aorta and vasculature: Atherosclerosis including coronary arteries.    Chest wall and skeletal structures: Unremarkable except age-appropriate degenerative changes.    Upper abdomen: Unremarkable.  Impression: Lung-RADS Category:  2 - Benign Appearance or Behavior - continue annual screening with LDCT in 12 months.    Clinically or potentially clinically significant non lung cancer finding:  None.    Prior Lung Cancer Modifier:  No history of prior lung cancer.    Electronically signed by: Toi Anderson MD  Date:    07/10/2023  Time:    11:18    Pulmonary function tests:  Spirometry shows a reduced vital capacity suggesting restriction. Spirometry remains unimproved following bronchodilator. Lung volumes show mild restriction is present. Airway mechanics are abnormal showing increased airway resistance and decreased   conductance. DLCO is moderately decreased. MVV is moderately decreased.   Â        11/2/2023     2:28 PM 11/2/2023     2:24 PM 11/2/2023     1:30 PM 9/12/2023    11:07 AM 8/25/2023     2:59 PM 7/6/2023    11:03 AM 7/6/2023    10:00 AM   Pulmonary Studies Review   SpO2 98 %  96 % 99 % 93 % 97 % 97 %   Ordering Provider  Álvaro aSnchez MD        Interpreting Provider  Álvaro Sanchez MD        Performing nurse/tech/RT  VT, RT        Diagnosis  COPD        Height 5' (1.524 m) 5' (1.524 m) 5' (1.524 m) 5' (1.524 m) 5' (1.524 m) 5' (1.524 m) 5' (1.524 m)   Weight 70.2 kg (154 lb 12.2 oz) 70.2 kg (154 lb 12.2 oz) 69.9 kg (154 lb 1.6 oz) 69.6 kg (153 lb 7 oz) 69.9 kg (154 lb) 70.1 kg (154 lb 8.7 oz)    BMI (Calculated) 30.2 30.2 30.1 30 30.1 30.2    Predicted Distance 316.43 316.43 317.06 317.68 317.06 316.43 504.88   Patient Race          6MWT Status  completed without stopping        Patient Reported  Dyspnea;Other (Comment)        Was O2 used?  No         6MW Distance walked (feet)  1200 feet        Distance walked (meters)  365.76 meters        Did patient stop?  No        Type of assistive device(s) used?  no assistive devices        Is extra documentation required for this patient?  Yes        Oxygen Saturation  97 %        Supplemental Oxygen  Room Air        Heart Rate  81 bpm        Blood Pressure  139/65        Mookie Dyspnea Rating   nothing at all        Oxygen Saturation  98 %        Supplemental Oxygen  Room Air        Heart Rate  112 bpm        Blood Pressure  144/60        Mookie Dyspnea Rating   somewhat heavy        Recovery Time (seconds)  240 seconds        Oxygen Saturation  98 %        Supplemental Oxygen  Room Air        Heart Rate  86 bpm        Blood Pressure  98/53        Mookie Dyspnea Rating   very light        Is procedure ready for interpretation?  Yes        Did the patient stop or pause?  No        Total Time Walked (Calculated)  360 seconds        Total Laps Walked  6        Final Partial Lap Distance (feet)  0 feet        Total Distance Feet (Calculated)  1200 feet        Total Distance Meters (Calculated)  365.76 meters        Predicted Distance Meters (Calculated) 457.89 meters 457.89 meters 458.57 meters 459.26 meters 458.68 meters 458.12 meters    Percentage of Predicted (Calculated)  79.88        Peak VO2 (Calculated)  14.95        Mets  4.27        Has The Patient Had a Previous Six Minute Walk Test?  Yes        Oxygen Qualification?  No              11/2/2023     2:28 PM 11/2/2023     2:24 PM 11/2/2023     1:30 PM 9/12/2023    11:07 AM 8/25/2023     2:59 PM 7/6/2023    11:03 AM 7/6/2023    10:00 AM   Pulmonary Function Tests   SpO2 98 %  96 % 99 % 93 % 97 % 97 %   Ordering Provider  Álvaro Sanchez MD        Performing nurse/tech/RT  VT, RT        Diagnosis  COPD        Height 5' (1.524 m) 5' (1.524 m) 5' (1.524 m) 5' (1.524 m) 5' (1.524 m) 5' (1.524 m) 5' (1.524 m)   Weight 70.2 kg (154 lb 12.2 oz) 70.2 kg (154 lb 12.2 oz) 69.9 kg  (154 lb 1.6 oz) 69.6 kg (153 lb 7 oz) 69.9 kg (154 lb) 70.1 kg (154 lb 8.7 oz)    BMI (Calculated) 30.2 30.2 30.1 30 30.1 30.2    Patient Race          6MWT Status  completed without stopping        Patient Reported  Dyspnea;Other (Comment)        Was O2 used?  No        6MW Distance walked (feet)  1200 feet        Distance walked (meters)  365.76 meters        Did patient stop?  No        Type of assistive device(s) used?  no assistive devices        Oxygen Saturation  97 %        Supplemental Oxygen  Room Air        Heart Rate  81 bpm        Blood Pressure  139/65        Mookie Dyspnea Rating   nothing at all        Oxygen Saturation  98 %        Supplemental Oxygen  Room Air        Heart Rate  112 bpm        Blood Pressure  144/60        Mookie Dyspnea Rating   somewhat heavy        Recovery Time (seconds)  240 seconds        Oxygen Saturation  98 %        Supplemental Oxygen  Room Air        Heart Rate  86 bpm        Blood Pressure  98/53        Mookie Dyspnea Rating   very light        Is procedure ready for interpretation?  Yes        Oxygen Qualification?  No            Assessment:       1. COPD exacerbation    2. Mixed simple and mucopurulent chronic bronchitis    3. Mucopurulent chronic bronchitis    4. Asthma with COPD    5. OTONIEL (generalized anxiety disorder)    6. Other insomnia    7. Adult bronchiectasis               Outpatient Encounter Medications as of 11/2/2023   Medication Sig Dispense Refill    albuterol (PROVENTIL/VENTOLIN HFA) 90 mcg/actuation inhaler Inhale 2 puffs into the lungs every 4 (four) hours as needed for Wheezing. 8.5 g 2    albuterol-ipratropium (DUO-NEB) 2.5 mg-0.5 mg/3 mL nebulizer solution Take 3 mLs by nebulization every 6 (six) hours as needed for Wheezing or Shortness of Breath. Rescue 300 mL 11    azithromycin (ZITHROMAX Z-PHAM) 250 MG tablet 500 mg on day 1 (two tablets) followed by 250 mg once daily on days 2-5 6 tablet 0    bimatoprost (LUMIGAN) 0.03 % ophthalmic drops  Apply 1 drop to eye.      brinzolamide-brimonidine (SIMBRINZA) 1-0.2 % DrpS Apply 1 drop to eye.      carBAMazepine (TEGRETOL) 200 mg tablet Take 200 mg by mouth 2 (two) times daily.      clonazePAM (KLONOPIN) 1 MG tablet Take 1 tablet (1 mg total) by mouth 2 (two) times daily. 60 tablet 5    clopidogreL (PLAVIX) 75 mg tablet Take 1 tablet (75 mg total) by mouth once daily. 90 tablet 3    ezetimibe (ZETIA) 10 mg tablet Take 1 tablet (10 mg total) by mouth once daily. 90 tablet 3    famotidine (PEPCID) 20 MG tablet Take 1 tablet by mouth twice daily 60 tablet 0    fluticasone propionate (FLONASE) 50 mcg/actuation nasal spray 2 sprays by Nasal route daily as needed.      fluticasone-umeclidin-vilanter (TRELEGY ELLIPTA) 200-62.5-25 mcg inhaler Inhale 1 puff into the lungs once daily. 60 each 6    LINZESS 290 mcg Cap capsule Take 290 mcg by mouth.      lisinopriL-hydrochlorothiazide (PRINZIDE,ZESTORETIC) 20-25 mg Tab Take 1 tablet by mouth once daily. 90 tablet 3    montelukast (SINGULAIR) 10 mg tablet Take 1 tablet (10 mg total) by mouth every evening. 90 tablet 3    predniSONE (DELTASONE) 20 MG tablet Prednisone 60 mg/ day for 3 days, 40 mg/day for 3 days,20 mg/ day for 3 days, (1/2 tablet )10 mg a day for 3 days. 20 tablet 0    promethazine-dextromethorphan (PROMETHAZINE-DM) 6.25-15 mg/5 mL Syrp Take 5 mLs by mouth nightly as needed (Cough). 120 mL 0    rosuvastatin (CRESTOR) 40 MG Tab Take 1 tablet (40 mg total) by mouth once daily. 90 tablet 3    sertraline (ZOLOFT) 100 MG tablet Take 1 tablet (100 mg total) by mouth once daily. 90 tablet 3    sodium chloride for inhalation (SODIUM CHLORIDE 0.9%) 0.9 % nebulizer solution Take 3 mLs by nebulization as needed for Wheezing (chest congestion). 300 mL 11    verapamiL (CALAN-SR) 120 MG CR tablet Take 120 mg by mouth.      [DISCONTINUED] albuterol (PROVENTIL) 2.5 mg /3 mL (0.083 %) nebulizer solution Take 3 mLs (2.5 mg total) by nebulization 4 (four) times daily as needed  for Wheezing. 360 mL 11    [DISCONTINUED] albuterol (PROVENTIL) 2.5 mg /3 mL (0.083 %) nebulizer solution Take 3 mLs (2.5 mg total) by nebulization every 6 (six) hours as needed for Wheezing. Rescue 360 mL 11    [DISCONTINUED] clonazePAM (KLONOPIN) 1 MG tablet Take 1 tablet (1 mg total) by mouth 2 (two) times daily. 60 tablet 5    [DISCONTINUED] famotidine (PEPCID) 20 MG tablet Take 1 tablet by mouth twice daily 60 tablet 0    [DISCONTINUED] fluticasone-umeclidin-vilanter (TRELEGY ELLIPTA) 200-62.5-25 mcg inhaler Inhale 1 puff into the lungs once daily. 60 each 6    [DISCONTINUED] ipratropium (ATROVENT) 0.02 % nebulizer solution Take 2.5 mLs (500 mcg total) by nebulization every 6 (six) hours. Rescue 300 mL 11    [DISCONTINUED] montelukast (SINGULAIR) 10 mg tablet Take 10 mg by mouth.       No facility-administered encounter medications on file as of 11/2/2023.     Orders Placed This Encounter   Procedures    Influenza - Quadrivalent (PF)    Ambulatory referral/consult to Allergy     Standing Status:   Future     Standing Expiration Date:   12/2/2024     Referral Priority:   Routine     Referral Type:   Allergy Testing     Referral Reason:   Specialty Services Required     Requested Specialty:   Allergy     Number of Visits Requested:   1    Spirometry with/without bronchodilator     Standing Status:   Future     Standing Expiration Date:   11/1/2024     Order Specific Question:   Release to patient     Answer:   Immediate     Plan:       Requested Prescriptions     Signed Prescriptions Disp Refills    albuterol-ipratropium (DUO-NEB) 2.5 mg-0.5 mg/3 mL nebulizer solution 300 mL 11     Sig: Take 3 mLs by nebulization every 6 (six) hours as needed for Wheezing or Shortness of Breath. Rescue    sodium chloride for inhalation (SODIUM CHLORIDE 0.9%) 0.9 % nebulizer solution 300 mL 11     Sig: Take 3 mLs by nebulization as needed for Wheezing (chest congestion).    fluticasone-umeclidin-vilanter (TRELEGY ELLIPTA)  200-62.5-25 mcg inhaler 60 each 6     Sig: Inhale 1 puff into the lungs once daily.    predniSONE (DELTASONE) 20 MG tablet 20 tablet 0     Sig: Prednisone 60 mg/ day for 3 days, 40 mg/day for 3 days,20 mg/ day for 3 days, (1/2 tablet )10 mg a day for 3 days.    azithromycin (ZITHROMAX Z-PHAM) 250 MG tablet 6 tablet 0     Si mg on day 1 (two tablets) followed by 250 mg once daily on days 2-5    montelukast (SINGULAIR) 10 mg tablet 90 tablet 3     Sig: Take 1 tablet (10 mg total) by mouth every evening.    clonazePAM (KLONOPIN) 1 MG tablet 60 tablet 5     Sig: Take 1 tablet (1 mg total) by mouth 2 (two) times daily.     COPD exacerbation  -     predniSONE (DELTASONE) 20 MG tablet; Prednisone 60 mg/ day for 3 days, 40 mg/day for 3 days,20 mg/ day for 3 days, (1/2 tablet )10 mg a day for 3 days.  Dispense: 20 tablet; Refill: 0  -     azithromycin (ZITHROMAX Z-PHAM) 250 MG tablet; 500 mg on day 1 (two tablets) followed by 250 mg once daily on days 2-5  Dispense: 6 tablet; Refill: 0  -     Spirometry with/without bronchodilator; Future; Expected date: 2024    Mixed simple and mucopurulent chronic bronchitis  -     albuterol-ipratropium (DUO-NEB) 2.5 mg-0.5 mg/3 mL nebulizer solution; Take 3 mLs by nebulization every 6 (six) hours as needed for Wheezing or Shortness of Breath. Rescue  Dispense: 300 mL; Refill: 11  -     sodium chloride for inhalation (SODIUM CHLORIDE 0.9%) 0.9 % nebulizer solution; Take 3 mLs by nebulization as needed for Wheezing (chest congestion).  Dispense: 300 mL; Refill: 11  -     fluticasone-umeclidin-vilanter (TRELEGY ELLIPTA) 200-62.5-25 mcg inhaler; Inhale 1 puff into the lungs once daily.  Dispense: 60 each; Refill: 6  -     predniSONE (DELTASONE) 20 MG tablet; Prednisone 60 mg/ day for 3 days, 40 mg/day for 3 days,20 mg/ day for 3 days, (1/2 tablet )10 mg a day for 3 days.  Dispense: 20 tablet; Refill: 0  -     azithromycin (ZITHROMAX Z-PHAM) 250 MG tablet; 500 mg on day 1 (two  tablets) followed by 250 mg once daily on days 2-5  Dispense: 6 tablet; Refill: 0  -     Spirometry with/without bronchodilator; Future; Expected date: 05/04/2024    Mucopurulent chronic bronchitis  -     azithromycin (ZITHROMAX Z-PHAM) 250 MG tablet; 500 mg on day 1 (two tablets) followed by 250 mg once daily on days 2-5  Dispense: 6 tablet; Refill: 0  -     Spirometry with/without bronchodilator; Future; Expected date: 05/04/2024    Asthma with COPD  -     Ambulatory referral/consult to Allergy; Future; Expected date: 11/09/2023  -     montelukast (SINGULAIR) 10 mg tablet; Take 1 tablet (10 mg total) by mouth every evening.  Dispense: 90 tablet; Refill: 3    OTONIEL (generalized anxiety disorder)  -     clonazePAM (KLONOPIN) 1 MG tablet; Take 1 tablet (1 mg total) by mouth 2 (two) times daily.  Dispense: 60 tablet; Refill: 5    Other insomnia  -     clonazePAM (KLONOPIN) 1 MG tablet; Take 1 tablet (1 mg total) by mouth 2 (two) times daily.  Dispense: 60 tablet; Refill: 5    Adult bronchiectasis    Other orders  -     Influenza - Quadrivalent (PF)      Follow up in about 6 months (around 5/2/2024) for juan - on return, Follow up with NP.    MEDICAL DECISION MAKING: Moderate to high complexity.  Overall, the multiple problems listed are of moderate to high severity that may impact quality of life and activities of daily living. Side effects of medications, treatment plan as well as options and alternatives reviewed and discussed with patient. There was counseling of patient concerning these issues.    Total time spent in counseling and coordination of care - 40  minutes of total time spent on the encounter, which includes face to face time and non-face to face time preparing to see the patient (eg, review of tests), Obtaining and/or reviewing separately obtained history, Documenting clinical information in the electronic or other health record, Independently interpreting results (not separately reported) and  communicating results to the patient/family/caregiver, or Care coordination (not separately reported).    Time was used in discussion of prognosis, risks, benefits of treatment, instructions and compliance with regimen . Discussion with other physicians and/or health care providers - home health or for use of durable medical equipment (oxygen, nebulizers, CPAP, BiPAP) occurred.

## 2023-11-02 NOTE — PATIENT INSTRUCTIONS
What is COPD?  COPD stands for chronic obstructive pulmonary disease. It means the airways in your lungs are blocked (obstructed). Because of this, it is hard to breathe. You may have trouble with daily activities because of shortness of breath. Over time the shortness of breath usually worsens making it more and more difficult to take care of yourself and take part in activities. Chronic bronchitis and emphysema are two common types of COPD.  What happens in chronic bronchitis?  The cells in the airways make more mucus than normal. The mucus builds up, narrowing the airways. This means less air travels into and out of the lungs. The lining of the airways may also become inflamed (swollen) and causes the airways to narrow even more.            What happens in emphysema?  The small airways are damaged and lose their stretchiness. The airways collapse when you exhale, causing air to get trapped in the air sacs. This means that less oxygen enters the blood vessels and less oxygen is delivered to all of the cells of your body. This makes it hard to breathe.         Damage to cilia  Cilia are small hairs that line and protect the airways. Smoking damages the cilia. Damaged cilia can't sweep mucus and particles away. Some of the cilia are destroyed. This damage worsens COPD.      How did I get COPD?  Most people get COPD from smoking. Cigarette smoke damages lungs, which can develop into COPD over many years.  How COPD affects you  COPD makes you work harder to breathe. Air may get trapped in the lungs, which prevents your lungs from filling completely with fresh oxygen-filled air when you inhale (breathe in). It's harder to take deep breaths especially when you are active and start breathing faster. Over time, your lungs may become enlarged filling the lung with air that does not transfer oxygen into the blood. These problems cause you to have shortness of breath (also called dyspnea). Wheezing (hoarse, whistling  breathing), chronic cough, and fatigue (feeling tired and worn out) are also common.   © 3302-0375 [employer]. [address] [place] [address], [place] [address]. All rights reserved. This information is not intended as a substitute for professional medical care. Always follow your healthcare professional's instructions.           Using an Inhaler with a Spacer  To control asthma, you need to use your medicines the right way. Some medicines are inhaled using a device called a metered-dose inhaler (MDI). Metered-dose inhalers deliver medicine with a fine spray. You may be asked to use a spacer (holding tube) with your inhaler. The spacer helps make sure all the medicine you need goes into your lungs.   Steps for using an inhaler with a spacer  Step 1:  Remove the caps from the inhaler and spacer.  Shake the inhaler well and attach the spacer. If the inhaler is being used for the first time or has not been used for a while, prime it as directed by the product maker.  Step 2:  Breathe out normally.  Put the spacer between your teeth. Close your lips tightly around it.  Keep your chin up.  Step 3:  Spray 1 puff into the spacer by pressing down on the inhaler.  Then breathe in through your mouth as slowly and deeply as you can. This should take about 5-10 seconds. If you breathe too quickly, you may hear a whistling sound in certain spacers.  Step 4:  Take the spacer out of your mouth.  Hold your breath for a count of 10.  Then hold your lips together and slowly breathe out through your mouth   Using Dry-Powder Inhalers (DPIs)  Some asthma medications are inhaled using inhalers. Dry-powder inhalers (DPIs) dispense measured doses of powdered medicine into your lungs. DPIs often have counters to track the number of doses used. Keep in mind that DPIs don't all work the same way. So be sure you know how to use yours properly.  Using a DPI  Load the prescribed dose of medicine by following the instructions that came  with the inhaler.  Breathe out normally, holding the inhaler away from your mouth. Hold your chin up.  Put the mouthpiece between your lips. Breathe in deeply through the inhaler--not through your nose. You may not feel or taste the medicine as you breathe in. This is normal.  Take the mouthpiece out of your mouth. Hold your breath for a count of 10, or as long as you can comfortably.  Breathe out slowly--but do not breathe out through the inhaler. Moisture from your breath can make the powder stick inside the inhaler.  Be sure to close the inhaler and store it in a dry place.  Rinse your mouth with water and spit it out, don't swallow it.  Do not wash your DPI with soap and water. To clean the mouthpiece, wipe with a dry cloth as needed.    © 7387-6172 [employer]. [address] [place] [address]. All rights reserved. This information is not intended as a substitute for professional medical care. Always follow your healthcare professional's instructions.             Oxygen Therapy    Your body needs oxygen to work the right way. There is some oxygen in the air around us that goes into our lungs and makes normal levels of oxygen in the blood. Some people need more oxygen than what is in the air around us to breathe easier. You may need extra oxygen for a:  Short-term problem. This might be an infection, asthma flare up, or lung injury.  Long-term problem. These are illnesses like COPD (chronic obstructive pulmonary disease), cystic fibrosis, or emphysema.  Heart problem. You may have had a heart attack or have heart failure. You may also have a problem with your heart that you were born with.  Oxygen can be:  Compressed ? Oxygen is stored in metal tanks under high pressure. The tanks come in different sizes. Some are small enough to carry around with you.  Liquid ? Liquid oxygen is very cold. It turns into a gas when you breathe. It takes up less room than other types of oxygen, but costs  more.  Concentrated ? This uses oxygen already in the air. An electric machine filters out other gases and keeps just the oxygen. If you have this type of oxygen, you need to tell your electric company because your machine must be plugged into a wall socket to work.  Oxygen therapy is delivered to the lungs by:  Nasal cannula ? This is a plastic tube with two small prongs, which are placed in your nostrils. The long part of the tubing wraps around your ears, like glasses, then goes under your chin.  Face mask ? The mask fits over your nose and mouth. A tube attached to the mask is connected to the oxygen tank. You may need a mask if you get a higher rate of oxygen, if you have a stuffy nose, or cannot wear nasal cannula for some reason.    You will be able to breathe easier. You may be less tired and sleep better. You may see that your lips, ears, or fingers will have a more normal color.  The doctor may order an arterial blood gas (ABG) test. This is a blood test that shows how much oxygen is in your blood.  You may also have a pulse oximetry test. This is a tiny probe that is clipped to your finger. It also tells how much oxygen is in your blood.  The doctor will tell you how much oxygen you need and how often you will have the oxygen therapy.  Oxygen therapy is measured in one of 2 ways. Some people call this the flow rate or oxygen setting. Your doctor may order an amount of flow like 2 or 3 liters per minute. Or your doctor may order a percent of oxygen like 40% or 50%. Sometimes your doctor will order your oxygen using both of these numbers.  If you need home oxygen therapy, an oxygen supplier will set up the equipment in your house.  You will breathe oxygen through a tube or mask. You will not feel any pain. The length of treatment depends on your sickness.  You may need a humidifier attached to the oxygen system. This will help add moisture to the oxygen to keep your nose and throat from feeling so dry.  If  you are in a hospital, the staff will watch you closely and check your oxygen level. You may go home when your condition is stable. The length of treatment depends upon your illness.  Talk to your doctor about the right amount of activity for you.  You may have to take the oxygen tank with you when you go out.  Do not smoke or let anyone else smoke near you when you are using oxygen.  Tell your doctor and oxygen supplier if are going to travel.  Dry or stuffy nose  Nose bleeds  The tube may come off of the oxygen tank by accident  Risk of running out of oxygen  Risk of tripping over the tube  Risk of fire  Oxygen tank falls over and causes damage  Skin breakdown from the tubing being in one place all the time, such as behind your ears    ACTION PLAN    GREEN ZONE  My sputum is clear/white/usual color and easily cleared.  My breathing is no harder than usual.  I can do my usual activities.  I can think clearly.   Take your usual medicines, including oxygen, as you are told to do so by your health care provider.   YELLOW ZONE  My sputum has change (color, thickness, amount).  I am more short of breath than usual.  I cough or wheeze more.  I weigh more and my legs/feet swell.  I cannot do my usual activities without resting.   Call your health care provider. You will probably be told to begin taking an antibiotic and prednisone. Have your pharmacy phone number available.   RED ZONE  I cannot cough out my sputum.  I am much more short of breath than normal.  I need to sit up to breathe  I cannot do my usual activities.  I am unable to speak more than one or two words at a time.  I am confused.   Call your health care provider. You may be asked to come in to be seen, told to go to the emergency room, or told to call 9-1-1.

## 2023-11-02 NOTE — PROGRESS NOTES
Pulmonary Disease Management  [facility]  Follow up Visit  Chronic Care Management    Kenya Majano  was seen 11/2/2023  1:30 PM in the Pulmonary Disease Management [facility] for evaluation, education, reinforcement of self management techniques and exacerbation action plan.    Michael Blair    Past Medical History:   Diagnosis Date    Adult bronchiectasis 7/6/2023    Anxiety     Arthritis     Asthma     Cataract     Coronary artery disease     Emphysema of lung     Glaucoma     Hypertension     Seizures     Stroke        Patient's Medications   New Prescriptions    No medications on file   Previous Medications    ALBUTEROL (PROVENTIL) 2.5 MG /3 ML (0.083 %) NEBULIZER SOLUTION    Take 3 mLs (2.5 mg total) by nebulization 4 (four) times daily as needed for Wheezing.    ALBUTEROL (PROVENTIL) 2.5 MG /3 ML (0.083 %) NEBULIZER SOLUTION    Take 3 mLs (2.5 mg total) by nebulization every 6 (six) hours as needed for Wheezing. Rescue    ALBUTEROL (PROVENTIL/VENTOLIN HFA) 90 MCG/ACTUATION INHALER    Inhale 2 puffs into the lungs every 4 (four) hours as needed for Wheezing.    BIMATOPROST (LUMIGAN) 0.03 % OPHTHALMIC DROPS    Apply 1 drop to eye.    BRINZOLAMIDE-BRIMONIDINE (SIMBRINZA) 1-0.2 % DRPS    Apply 1 drop to eye.    CARBAMAZEPINE (TEGRETOL) 200 MG TABLET    Take 200 mg by mouth 2 (two) times daily.    CLONAZEPAM (KLONOPIN) 1 MG TABLET    Take 1 tablet (1 mg total) by mouth 2 (two) times daily.    CLOPIDOGREL (PLAVIX) 75 MG TABLET    Take 1 tablet (75 mg total) by mouth once daily.    EZETIMIBE (ZETIA) 10 MG TABLET    Take 1 tablet (10 mg total) by mouth once daily.    FAMOTIDINE (PEPCID) 20 MG TABLET    Take 1 tablet by mouth twice daily    FLUTICASONE PROPIONATE (FLONASE) 50 MCG/ACTUATION NASAL SPRAY    2 sprays by Nasal route daily as needed.    FLUTICASONE-UMECLIDIN-VILANTER (TRELEGY ELLIPTA) 200-62.5-25 MCG INHALER    Inhale 1 puff into the lungs once daily.    IPRATROPIUM (ATROVENT) 0.02 %  NEBULIZER SOLUTION    Take 2.5 mLs (500 mcg total) by nebulization every 6 (six) hours. Rescue    LINZESS 290 MCG CAP CAPSULE    Take 290 mcg by mouth.    LISINOPRIL-HYDROCHLOROTHIAZIDE (PRINZIDE,ZESTORETIC) 20-25 MG TAB    Take 1 tablet by mouth once daily.    MONTELUKAST (SINGULAIR) 10 MG TABLET    Take 10 mg by mouth.    PROMETHAZINE-DEXTROMETHORPHAN (PROMETHAZINE-DM) 6.25-15 MG/5 ML SYRP    Take 5 mLs by mouth nightly as needed (Cough).    ROSUVASTATIN (CRESTOR) 40 MG TAB    Take 1 tablet (40 mg total) by mouth once daily.    SERTRALINE (ZOLOFT) 100 MG TABLET    Take 1 tablet (100 mg total) by mouth once daily.    VERAPAMIL (CALAN-SR) 120 MG CR TABLET    Take 120 mg by mouth.   Modified Medications    No medications on file   Discontinued Medications    No medications on file             Educational assessment:   [x]            Good  []            Fair  []            Poor    Readiness to learn:   [x]            Good  []            Fair  []            Poor    Vision Status:   [x]            Good  []            Fair  []            Poor    Reading Ability:  [x]            Good  []            Fair  []            Poor    Knowledge of condition:   [x]            Good  []            Fair  []            Poor    Language Barriers:   []            Good  []            Fair  []            Poor  [x]            None    Cognitive/ Physical Barriers:   []            Good  []            Fair  []            Poor  [x]            None    Learning best by:                       [x]            Seeing  []            Hearing  []            Reading                         [x]            Doing    Describe any barrier /Limitation or financial implications of care choices identified     []            Financial  []            Emotional  []            Education  []            Vision/Hearing  []            Physical  [x]            None  []                TOPIC /CONTENT FOR TODAY:    [x]            MDI with or without spacer  [x]             Dry powder inhaler  []            Acapella   []           Peak Flow meter  [x]            COPD action plan  [x]            Nebulizer use  [x]            Oxygen use safety  [x]            Breathing and cough techniques  [x]            Energy conservation  [x]            Infection prevention  []           OTHER________________________        Learner:    [x]            Patient   []            Caregiver    Method:    [x]            Verbal explanation  [x]            Audio visual    [x]            Literature  [x]            Teach back      Evaluation:    [x]            Teach back  [x]            Demonstrate  [x]            Follow up phone call    []            2 weeks     [x]            4 weeks   [x]            PRN    Additional comments:   Patient was seen today to review respiratory medication purpose and proper technique for use of inhalers. Patient practiced proper technique using MDI with valved holding chamber (spacer) and DPI inhalers. Patient demonstrated understanding. Literature was given to patient.    Educated patient on the importance of utilizing supplemental oxygen when prescribed. Reviewed strategies for maximizing energy. Patient verbalized understanding. Literature given to patient.       Asthma trigger checklist was verbally reviewed and literature given to patient.     Infection prevention was discussed. Patient was reminded to get influenza vaccine. Hand hygiene and cleaning of respiratory equipment was also discussed. Patient verbalized understanding.      COPD action plan was reviewed and literature was given to patient. Patient verbalized understanding.      Plan:  Monthly Pulmonary Disease Management Questionnaire  Follow-up PDM appointment scheduled for 5/2/24  Reinforce education  Meds: Trelegy, albuterol, ipratropium bromide, NaCl   DME Needs: [facility]   Action Plan: COPD   Immunization: Pneumococcal- current, Flu-current , Covid 19- current   Next Provider Visit: Not scheduled   Next  Spirometry/CPFT: Not scheduled   Approximate time spent with patient: 30 mins

## 2023-11-28 DIAGNOSIS — I10 ESSENTIAL HYPERTENSION: ICD-10-CM

## 2023-11-28 RX ORDER — LISINOPRIL AND HYDROCHLOROTHIAZIDE 20; 25 MG/1; MG/1
1 TABLET ORAL DAILY
Qty: 90 TABLET | Refills: 0 | Status: SHIPPED | OUTPATIENT
Start: 2023-11-28 | End: 2024-01-17 | Stop reason: SDUPTHER

## 2023-12-11 ENCOUNTER — PATIENT OUTREACH (OUTPATIENT)
Dept: PULMONOLOGY | Facility: CLINIC | Age: 63
End: 2023-12-11
Payer: MEDICARE

## 2023-12-12 ENCOUNTER — TELEPHONE (OUTPATIENT)
Dept: INTERNAL MEDICINE | Facility: CLINIC | Age: 63
End: 2023-12-12
Payer: MEDICARE

## 2023-12-12 NOTE — TELEPHONE ENCOUNTER
Notified pt that we do not have any appts available today. Advised pt to go to urgent care for eval. She verbalized understanding.

## 2023-12-12 NOTE — TELEPHONE ENCOUNTER
----- Message from Lianna Wilson sent at 12/12/2023  8:50 AM CST -----  Contact: Kenya  .Patient is calling to speak with the nurse regarding appt today  . Reports not feeling to good. Patient states having a heavy chest and sinus and stuffy nose  . Please give patient a call back at [phone].

## 2023-12-13 ENCOUNTER — OFFICE VISIT (OUTPATIENT)
Dept: URGENT CARE | Facility: CLINIC | Age: 63
End: 2023-12-13
Payer: MEDICARE

## 2023-12-13 VITALS
WEIGHT: 154 LBS | HEIGHT: 60 IN | RESPIRATION RATE: 18 BRPM | SYSTOLIC BLOOD PRESSURE: 121 MMHG | HEART RATE: 79 BPM | OXYGEN SATURATION: 97 % | TEMPERATURE: 99 F | BODY MASS INDEX: 30.23 KG/M2 | DIASTOLIC BLOOD PRESSURE: 58 MMHG

## 2023-12-13 DIAGNOSIS — J44.1 COPD EXACERBATION: Primary | ICD-10-CM

## 2023-12-13 DIAGNOSIS — R05.9 COUGH, UNSPECIFIED TYPE: ICD-10-CM

## 2023-12-13 LAB
CTP QC/QA: YES
SARS-COV-2 AG RESP QL IA.RAPID: NEGATIVE

## 2023-12-13 PROCEDURE — 99213 PR OFFICE/OUTPT VISIT, EST, LEVL III, 20-29 MIN: ICD-10-PCS | Mod: S$GLB,,,

## 2023-12-13 PROCEDURE — 87811 SARS-COV-2 COVID19 W/OPTIC: CPT | Mod: QW,S$GLB,,

## 2023-12-13 PROCEDURE — 99213 OFFICE O/P EST LOW 20 MIN: CPT | Mod: S$GLB,,,

## 2023-12-13 PROCEDURE — 87811 SARS CORONAVIRUS 2 ANTIGEN POCT, MANUAL READ: ICD-10-PCS | Mod: QW,S$GLB,,

## 2023-12-13 RX ORDER — PREDNISONE 20 MG/1
40 TABLET ORAL DAILY
Qty: 10 TABLET | Refills: 0 | Status: SHIPPED | OUTPATIENT
Start: 2023-12-13 | End: 2023-12-18

## 2023-12-13 RX ORDER — BENZONATATE 100 MG/1
100 CAPSULE ORAL 3 TIMES DAILY PRN
Qty: 30 CAPSULE | Refills: 0 | Status: SHIPPED | OUTPATIENT
Start: 2023-12-13 | End: 2023-12-23

## 2023-12-13 RX ORDER — AZITHROMYCIN 250 MG/1
TABLET, FILM COATED ORAL
Qty: 6 TABLET | Refills: 0 | Status: SHIPPED | OUTPATIENT
Start: 2023-12-13 | End: 2023-12-18

## 2023-12-13 NOTE — PROGRESS NOTES
Subjective:      Patient ID: Kenya Majano is a 63 y.o. female.    Vitals:  height is 5' (1.524 m) and weight is 69.9 kg (154 lb). Her oral temperature is 98.6 °F (37 °C). Her blood pressure is 121/58 (abnormal) and her pulse is 79. Her respiration is 18 and oxygen saturation is 97%.     Chief Complaint: Sinus Problem    62yo female patient presents to the clinic with congestion, cough, fatigue, chest congestion, sinus pressure that began 1 week ago. She has been taking afrin (for 1 week) and Cecelia-Trout.     Sinus Problem  This is a new problem. The current episode started 1 to 4 weeks ago. The problem is unchanged. There has been no fever. Associated symptoms include congestion, coughing, headaches, shortness of breath, sinus pressure, sneezing and a sore throat. Pertinent negatives include no chills, diaphoresis, ear pain, hoarse voice, neck pain or swollen glands. Past treatments include nasal decongestants and acetaminophen.       Constitution: Negative for chills, sweating and fever.   HENT:  Positive for congestion, sinus pressure and sore throat. Negative for ear pain.    Neck: Negative for neck pain.   Respiratory:  Positive for cough and shortness of breath. Negative for wheezing.    Allergic/Immunologic: Positive for sneezing.   Neurological:  Positive for headaches.      Objective:     Physical Exam   Constitutional: She is oriented to person, place, and time. She appears well-developed. She is cooperative.  Non-toxic appearance. She does not appear ill. No distress.      Comments:Patient sitting in chair with no signs of distress. Patient able to complete sentences without pausing.       HENT:   Head: Normocephalic and atraumatic.   Ears:   Right Ear: Hearing, tympanic membrane, external ear and ear canal normal.   Left Ear: Hearing, tympanic membrane, external ear and ear canal normal.   Nose: Congestion present. No mucosal edema or rhinorrhea. Right sinus exhibits no maxillary sinus  tenderness and no frontal sinus tenderness. Left sinus exhibits no maxillary sinus tenderness and no frontal sinus tenderness.   Mouth/Throat: Uvula is midline and mucous membranes are normal. No trismus in the jaw. No uvula swelling. No oropharyngeal exudate, posterior oropharyngeal edema or posterior oropharyngeal erythema.   Eyes: Lids are normal.   Neck: Trachea normal and phonation normal. Neck supple. No edema present. No erythema present.   Cardiovascular: Normal rate, regular rhythm, normal heart sounds and normal pulses.   Pulmonary/Chest: Effort normal. No respiratory distress. She has no decreased breath sounds. She has wheezes (scant wheezing throughout). She has no rhonchi.   Abdominal: Normal appearance.   Musculoskeletal: Normal range of motion.         General: Normal range of motion.   Lymphadenopathy:     She has no cervical adenopathy.   Neurological: She is alert and oriented to person, place, and time. She exhibits normal muscle tone.   Skin: Skin is warm, dry, intact and not diaphoretic.   Psychiatric: Her speech is normal and behavior is normal.   Nursing note and vitals reviewed.    Results for orders placed or performed in visit on 12/13/23   SARS Coronavirus 2 Antigen, POCT Manual Read   Result Value Ref Range    SARS Coronavirus 2 Antigen Negative Negative     Acceptable Yes        Assessment:     1. COPD exacerbation    2. Cough, unspecified type        Plan:       COPD exacerbation  -     predniSONE (DELTASONE) 20 MG tablet; Take 2 tablets (40 mg total) by mouth once daily. for 5 days  Dispense: 10 tablet; Refill: 0  -     azithromycin (Z-PHAM) 250 MG tablet; Take 2 tablets by mouth on day 1; Take 1 tablet by mouth on days 2-5  Dispense: 6 tablet; Refill: 0  -     benzonatate (TESSALON) 100 MG capsule; Take 1 capsule (100 mg total) by mouth 3 (three) times daily as needed for Cough.  Dispense: 30 capsule; Refill: 0    Cough, unspecified type  -     SARS Coronavirus 2  Antigen, POCT Manual Read    Pt in no acute distress. Vitals reassuring. Non-toxic appearing. Discussed negative COVID test results in detail. Discussed treatment of COPD exacerbation with azithromycin, prednisone and Tessalon Perles. Discussed f/u with pulmonology. ER precautions were given.  Discussed the importance of further evaluation if symptoms worsen. Patient stated verbal understanding.    Patient Instructions     Patient Instructions   PLEASE READ YOUR DISCHARGE INSTRUCTIONS ENTIRELY AS IT CONTAINS IMPORTANT INFORMATION.     Please drink plenty of fluids.     Please get plenty of rest.     Please return here or go to the Emergency Department for any concerns or worsening of condition.     Please take an over the counter antihistamine medication (allegra/Claritin/Zyrtec) of your choice as directed.        If not allergic, please take over the counter Tylenol (Acetaminophen) and/or Motrin (Ibuprofen) as directed for control of pain and/or fever.  Please follow up with your primary care doctor or specialist as needed.     Sore throat recommendations: Warm fluids, warm salt water gargles, throat lozenges, tea, honey, soup, rest, hydration.     Use over the counter flonase: one spray each nostril twice daily OR two sprays each nostril once daily.     Take antibiotics and steroids to completion.     If you  smoke, please stop smoking.     Please return or see your primary care doctor if you develop new or worsening symptoms.      Please arrange follow up with your primary medical clinic as soon as possible. You must understand that you've received an Urgent Care treatment only and that you may be released before all of your medical problems are known or treated. You, the patient, will arrange for follow up as instructed. If your symptoms worsen or fail to improve you should go to the Emergency Room.

## 2023-12-13 NOTE — PATIENT INSTRUCTIONS
Patient Instructions   PLEASE READ YOUR DISCHARGE INSTRUCTIONS ENTIRELY AS IT CONTAINS IMPORTANT INFORMATION.     Please drink plenty of fluids.     Please get plenty of rest.     Please return here or go to the Emergency Department for any concerns or worsening of condition.     Please take an over the counter antihistamine medication (allegra/Claritin/Zyrtec) of your choice as directed.        If not allergic, please take over the counter Tylenol (Acetaminophen) and/or Motrin (Ibuprofen) as directed for control of pain and/or fever.  Please follow up with your primary care doctor or specialist as needed.     Sore throat recommendations: Warm fluids, warm salt water gargles, throat lozenges, tea, honey, soup, rest, hydration.     Use over the counter flonase: one spray each nostril twice daily OR two sprays each nostril once daily.     Take antibiotics and steroids to completion.     If you  smoke, please stop smoking.     Please return or see your primary care doctor if you develop new or worsening symptoms.      Please arrange follow up with your primary medical clinic as soon as possible. You must understand that you've received an Urgent Care treatment only and that you may be released before all of your medical problems are known or treated. You, the patient, will arrange for follow up as instructed. If your symptoms worsen or fail to improve you should go to the Emergency Room.

## 2024-01-17 ENCOUNTER — OFFICE VISIT (OUTPATIENT)
Dept: INTERNAL MEDICINE | Facility: CLINIC | Age: 64
End: 2024-01-17
Payer: MEDICARE

## 2024-01-17 VITALS
WEIGHT: 156.5 LBS | SYSTOLIC BLOOD PRESSURE: 120 MMHG | TEMPERATURE: 98 F | HEART RATE: 100 BPM | BODY MASS INDEX: 30.57 KG/M2 | OXYGEN SATURATION: 95 % | DIASTOLIC BLOOD PRESSURE: 76 MMHG

## 2024-01-17 DIAGNOSIS — Z12.31 SCREENING MAMMOGRAM FOR BREAST CANCER: ICD-10-CM

## 2024-01-17 DIAGNOSIS — I10 ESSENTIAL HYPERTENSION: ICD-10-CM

## 2024-01-17 DIAGNOSIS — Z86.73 HISTORY OF STROKE: ICD-10-CM

## 2024-01-17 DIAGNOSIS — K59.09 CHRONIC CONSTIPATION: ICD-10-CM

## 2024-01-17 DIAGNOSIS — F41.1 GAD (GENERALIZED ANXIETY DISORDER): Primary | ICD-10-CM

## 2024-01-17 DIAGNOSIS — K21.9 LARYNGOPHARYNGEAL REFLUX (LPR): ICD-10-CM

## 2024-01-17 DIAGNOSIS — E78.2 MIXED HYPERLIPIDEMIA: ICD-10-CM

## 2024-01-17 PROCEDURE — 3008F BODY MASS INDEX DOCD: CPT | Mod: CPTII,S$GLB,, | Performed by: NURSE PRACTITIONER

## 2024-01-17 PROCEDURE — 3074F SYST BP LT 130 MM HG: CPT | Mod: CPTII,S$GLB,, | Performed by: NURSE PRACTITIONER

## 2024-01-17 PROCEDURE — 1159F MED LIST DOCD IN RCRD: CPT | Mod: CPTII,S$GLB,, | Performed by: NURSE PRACTITIONER

## 2024-01-17 PROCEDURE — 4010F ACE/ARB THERAPY RXD/TAKEN: CPT | Mod: CPTII,S$GLB,, | Performed by: NURSE PRACTITIONER

## 2024-01-17 PROCEDURE — 99999 PR PBB SHADOW E&M-EST. PATIENT-LVL V: CPT | Mod: PBBFAC,,, | Performed by: NURSE PRACTITIONER

## 2024-01-17 PROCEDURE — 99214 OFFICE O/P EST MOD 30 MIN: CPT | Mod: S$GLB,,, | Performed by: NURSE PRACTITIONER

## 2024-01-17 PROCEDURE — 3078F DIAST BP <80 MM HG: CPT | Mod: CPTII,S$GLB,, | Performed by: NURSE PRACTITIONER

## 2024-01-17 PROCEDURE — 1160F RVW MEDS BY RX/DR IN RCRD: CPT | Mod: CPTII,S$GLB,, | Performed by: NURSE PRACTITIONER

## 2024-01-17 RX ORDER — LISINOPRIL AND HYDROCHLOROTHIAZIDE 20; 25 MG/1; MG/1
1 TABLET ORAL DAILY
Qty: 90 TABLET | Refills: 0 | Status: SHIPPED | OUTPATIENT
Start: 2024-01-17 | End: 2025-01-16

## 2024-01-17 RX ORDER — LINACLOTIDE 290 UG/1
290 CAPSULE, GELATIN COATED ORAL
Qty: 90 CAPSULE | Refills: 0 | Status: SHIPPED | OUTPATIENT
Start: 2024-01-17

## 2024-01-17 RX ORDER — SERTRALINE HYDROCHLORIDE 100 MG/1
100 TABLET, FILM COATED ORAL DAILY
Qty: 90 TABLET | Refills: 0 | Status: SHIPPED | OUTPATIENT
Start: 2024-01-17

## 2024-01-17 RX ORDER — FAMOTIDINE 20 MG/1
20 TABLET, FILM COATED ORAL 2 TIMES DAILY
Qty: 180 TABLET | Refills: 0 | Status: SHIPPED | OUTPATIENT
Start: 2024-01-17

## 2024-01-17 RX ORDER — CLOPIDOGREL BISULFATE 75 MG/1
75 TABLET ORAL DAILY
Qty: 90 TABLET | Refills: 0 | Status: SHIPPED | OUTPATIENT
Start: 2024-01-17

## 2024-01-17 RX ORDER — ROSUVASTATIN CALCIUM 40 MG/1
40 TABLET, COATED ORAL DAILY
Qty: 90 TABLET | Refills: 0 | Status: SHIPPED | OUTPATIENT
Start: 2024-01-17 | End: 2024-04-16

## 2024-01-17 NOTE — PROGRESS NOTES
Subjective:       Patient ID: Kenya Majano is a 63 y.o. female.    Chief Complaint: Hypertension    63 year old here for med refills  Last annual 12/2022  Wants to follow Dr. Martinez to her new location  Bp controlled on current regimen  Mood stable  Bowels moving fine on linzess    Hypertension  Pertinent negatives include no chest pain, headaches, shortness of breath or sweats.   Cough  This is a recurrent problem. The current episode started in the past 7 days. The problem has been unchanged. The problem occurs every few hours. The cough is Productive of sputum. Associated symptoms include heartburn, nasal congestion and wheezing. Pertinent negatives include no chest pain, chills, ear congestion, ear pain, fever, headaches, hemoptysis, myalgias, postnasal drip, rash, rhinorrhea, sore throat, shortness of breath, sweats or weight loss. The symptoms are aggravated by dust, fumes, lying down, pollens and stress. The treatment provided moderate relief. Her past medical history is significant for asthma, bronchitis, COPD and emphysema. There is no history of bronchiectasis, environmental allergies or pneumonia.       /76   Pulse 100   Temp 98.1 °F (36.7 °C)   Wt 71 kg (156 lb 8.4 oz)   LMP  (LMP Unknown)   SpO2 95%   BMI 30.57 kg/m²     Review of Systems   Constitutional:  Negative for chills, fever and weight loss.   HENT:  Negative for ear pain, postnasal drip, rhinorrhea and sore throat.    Respiratory:  Positive for cough and wheezing. Negative for hemoptysis and shortness of breath.    Cardiovascular:  Negative for chest pain.   Gastrointestinal:  Positive for heartburn.   Musculoskeletal:  Negative for myalgias.   Skin:  Negative for rash.   Allergic/Immunologic: Negative for environmental allergies.   Neurological:  Negative for headaches.   Hematological:  Bruises/bleeds easily.       Objective:      Physical Exam  Vitals and nursing note reviewed.   Constitutional:       General: She is  not in acute distress.     Appearance: Normal appearance. She is well-developed. She is not diaphoretic.   HENT:      Head: Normocephalic and atraumatic.   Cardiovascular:      Rate and Rhythm: Normal rate and regular rhythm.      Heart sounds: Normal heart sounds. No murmur heard.  Pulmonary:      Effort: Pulmonary effort is normal. No respiratory distress.      Breath sounds: Normal breath sounds.   Musculoskeletal:         General: Normal range of motion.   Skin:     General: Skin is warm and dry.      Findings: No rash.   Neurological:      Mental Status: She is alert.   Psychiatric:         Mood and Affect: Mood normal.         Behavior: Behavior normal. Behavior is cooperative.         Thought Content: Thought content normal.         Judgment: Judgment normal.         Assessment:       1. OTONIEL (generalized anxiety disorder)    2. Essential hypertension    3. History of stroke    4. Laryngopharyngeal reflux (LPR)    5. Mixed hyperlipidemia    6. Chronic constipation    7. Screening mammogram for breast cancer        Plan:       Kenya was seen today for hypertension.    Diagnoses and all orders for this visit:    OTONIEL (generalized anxiety disorder)  -     sertraline (ZOLOFT) 100 MG tablet; Take 1 tablet (100 mg total) by mouth once daily.  Stable on zoloft    Essential hypertension  -     lisinopriL-hydrochlorothiazide (PRINZIDE,ZESTORETIC) 20-25 mg Tab; Take 1 tablet by mouth once daily.  Stable on lisinopril hctz    History of stroke  -     clopidogreL (PLAVIX) 75 mg tablet; Take 1 tablet (75 mg total) by mouth once daily.  Stable, refill givne    Laryngopharyngeal reflux (LPR)  -     famotidine (PEPCID) 20 MG tablet; Take 1 tablet (20 mg total) by mouth 2 (two) times daily.  Stable; refill given    Mixed hyperlipidemia  -     rosuvastatin (CRESTOR) 40 MG Tab; Take 1 tablet (40 mg total) by mouth once daily.  Stable; on statin    Chronic constipation  -     LINZESS 290 mcg Cap capsule; Take 1 capsule (290 mcg  total) by mouth before breakfast.  Stable on linzess    Screening mammogram for breast cancer  -     Mammo Digital Screening Bilat w/ Shahid; Future      90 day supply given; will need to do annual with Primary Care Physician in the next 90 days

## 2024-02-14 PROBLEM — J44.89 COPD WITH ASTHMA: Status: ACTIVE | Noted: 2021-06-18

## 2024-02-14 PROBLEM — R06.09 EXERTIONAL DYSPNEA: Status: ACTIVE | Noted: 2024-02-14

## 2024-02-14 PROBLEM — R05.9 COUGH: Status: ACTIVE | Noted: 2024-02-14

## 2024-02-14 NOTE — PROGRESS NOTES
Subjective:       Patient ID: Kenya Majano is a 63 y.o. female.    Chief Complaint:  NEW PATIENT TO ALLERGY IMMUNOLOGY SERVICE.\  ASTHMA- COPD, PERIPHERAL EOSINOPHILIA ?  IgE level not available ,, Non - Allergic Rhinitis per Region - 6 inhalant RAST - Region- 6 and FeNO 96 ppb    HPI:  NEW PATIENT REFERRED BY PULMONOLOGIST GABBY LI MD -- FOR ALLERGY SKIN TESTING-- SIX MONTHS AGO-- IgE  Immuno CAP-- Region - 6- inhalants was normal-- negative----  Accompanied by her ..------------------------------------  [age] year old with the above Complaints.   Has adult onset asthma / Asthma 'COPD overlap. Has 41 pack year cigarette smoking history.   PFT with restriction and no improvement following bronchodilator use. Quit smoking in January 2023  HAS VERY POOR EXERCISE TOLERANCE. BMI 30.57  -------------------------------------------------------------------------------------------------------------------------------  As a child did  not have allergies, eczema or asthma .  Developed asthma in her 20 s- Has been a long time cigarette smoker. She was diagnosed with COPD over 5 years ago.  She developed eczema of the forearms as a young adult and still has eczema.  In the last 6 months she has been experiencing generalized pruritis  She has a family history of Eczema and asthma ( brother. ).  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  No occupational exposure to asbestos, silica or petrochemical.  She has one indoor dog. No out of state or foreign travels. '---------------------------------  ------------------------------------------------------------------------------------------------  On Pepcid 40 mg as recommended by ENT Dr Babb for laryngo- pharyngeal reflux .  A walk in clinic, on 12- 13- 2023--  "started her on a 12- days tapering course of Prednisone.  Has perennial Allergy nasal symptoms  Will do immune work up.    SEE IMPRESSION / DIAGNOSIS AND MANAGEMENT PLAN DISCUSSED.:  "Not ADDING UP-- NON ALLERGIC BY SKIN TESTING -- t2 asthma per Fe NO--- 96 ppb    ------------------------------------------------------------------------------------------------------------------------------  Had received one PREVNAR- 13 vaccine  in 2021.  Not been immunized with AREXVY- RSV VACCINE.  Has glaucoma over the years- Intra ocular pressure are stable and normal   ( In spite of daily Trelegy and Duoneb treatments ) per her ophthalmologist in [facility]   on November 6 th 2023-- sees him q 6 months.         Sulfa (sulfonamide antibiotics), Dimenhydrinate, Sumatriptan, and Latex -- allergies were reported      Past Medical History:   Diagnosis Date    Adult bronchiectasis 7/6/2023    Anxiety     Arthritis     Asthma     Cataract     Coronary artery disease     Emphysema of lung     Glaucoma     Hypertension     Seizures     Stroke        Family History   Problem Relation Age of Onset    Diabetes Mother     Stroke Mother     Heart disease Father     Diabetes Maternal Grandmother     No Known Problems Maternal Grandfather     Hearing loss Paternal Grandmother     Hearing loss Paternal Grandfather        Environmental History: Dust Mite Controls: Dust mite controls are already in place.     Review of Systems   Constitutional:  Positive for fatigue. Negative for fever.   HENT:  Positive for congestion and postnasal drip. Negative for ear pain, rhinorrhea, sinus pressure, sinus pain, sneezing and sore throat.    Eyes: Negative.  Negative for redness and itching.   Respiratory:  Positive for cough, chest tightness and shortness of breath. Negative for wheezing.    Cardiovascular: Negative.  Negative for chest pain.   Gastrointestinal: Negative.  Negative for nausea.   Endocrine: Negative.  Negative for cold intolerance. "   Genitourinary: Negative.  Negative for frequency.   Musculoskeletal: Negative.  Negative for myalgias.   Skin: Negative.  Negative for rash.   Allergic/Immunologic: Negative for environmental allergies, food allergies and immunocompromised state.   Neurological: Negative.  Negative for dizziness and headaches.   Hematological: Negative.  Negative for adenopathy.   Psychiatric/Behavioral: Negative.  Negative for sleep disturbance.      Objective:     Visit Vitals  LMP  (LMP Unknown)       Physical Exam  Vitals and nursing note reviewed. Exam conducted with a chaperone present.   Constitutional:       Appearance: Normal appearance. She is obese.      Comments: ON OXYGEN- PORTABLE   HENT:      Head: Normocephalic and atraumatic.      Right Ear: Tympanic membrane, ear canal and external ear normal.      Left Ear: Tympanic membrane, ear canal and external ear normal.      Nose: Congestion and rhinorrhea present.      Mouth/Throat:      Mouth: Mucous membranes are moist.      Pharynx: Oropharynx is clear.   Eyes:      Extraocular Movements: Extraocular movements intact.      Conjunctiva/sclera: Conjunctivae normal.      Pupils: Pupils are equal, round, and reactive to light.   Cardiovascular:      Rate and Rhythm: Normal rate and regular rhythm.      Pulses: Normal pulses.      Heart sounds: Normal heart sounds.   Pulmonary:      Effort: Pulmonary effort is normal.      Breath sounds: Normal breath sounds.   Abdominal:      General: Abdomen is flat. Bowel sounds are normal.      Palpations: Abdomen is soft.   Musculoskeletal:         General: Normal range of motion.      Cervical back: Normal range of motion and neck supple.   Skin:     General: Skin is warm and dry.      Capillary Refill: Capillary refill takes less than 2 seconds.   Neurological:      General: No focal deficit present.      Mental Status: She is alert and oriented to person, place, and time. Mental status is at baseline.   Psychiatric:         Mood  and Affect: Mood normal.         Behavior: Behavior normal.         Thought Content: Thought content normal.         Judgment: Judgment normal.       Assessment:      1. COPD with asthma    2. Chronic obstructive pulmonary disease, unspecified COPD type    3. Cough, unspecified type    4. Exertional dyspnea    5. Adult bronchiectasis    6. Ex-cigarette smoker    7. Constipation, unspecified constipation type    8. Mixed hyperlipidemia    9. Essential hypertension    10     Elevated Fe NO--- 96 ppb- T2 Asthma  11      PERIPHERAL EOSINOPHILIA--  k ON 02- 03- 2023, 672 K on 11- 22- 2023  12      Region - 6 RAST Inhalant aseo allergens-02-23- 20123-- normal-- NON ALLERGIC RHINITIS.  13      Stopped smoking cigarettes January 2021-- 41  pack year cigarette smoker           On 11- 02 -2023-- Fe NO  96 PPB ? SERUM IgE, pft - no improvement in FEV1 following bronchodilator following          bronchodilator use Eitan vital capacity- restrictioinreased airway resistance, decreased conductance and          moderately decreased MVV and DLCOSpO2 98 % BMI 30.2  14    GENERALIZED PRURITUS. FOR THE PAST 6 MONTHS  15    ECZEMA- FORE ARMS- ADULT ONSET----  16    MALAISE AND FATIGUE  17    On home oxygen- portable O2 in the last one year.      Plan:       May repeat CBC if off of prednisone ( to screen for peripheral eosinophilia ) , do ANCA, ANTI- PHOSPHOLIPID ANTIBODIES,SED RATE, CRP, SERUM  IgG, A and M and E and Pneumococcal and Tetanus antibodies and CH 50.  -------------------------------------------------------------------------------------------------------------------  IgE RAST region 6- was NORMAL IN JUNE 02- 23- 2023.  COULD NOT- DO SKIN PRICK TESTS WITH INHALANT AERO ALLERGENS-- SINCE SHE COULD NOT STOP BENADRYL TAKING FOR GENERALIZED PRURITUS.  -------------------------------------------------------------------------------------  Monitor AECs - off of prednisone.  May offer TEZSPIRE OR DUPIXENT.-- after reviewing the  blood tests results .  -----------------------------------------------------------------------------------------------------  Consider Azithromycin prophylaxis.- 250 mg qd prophylaxis  AREXVY- RSV vaccine.- recommended.  Had 2 doses of COVID vaccine  Had ONE  PCV- 13-- last one was on 01- 19- 2021  -----------------------------------------------------------------------------------  Trelegy  200/ 62.5/ 25-- one puff daily.  Duoneb one unit bid / TID  Singulair 10 mg qd  Pepcid 40 mg qd  Flonase 50 mcg qd or bid  --------------------------------------------------------------  Treat all infections.  Follow up in 3 weeks--- ? Start on Dupixent or Tezspire.                  Problems Address                                                 Amount and/or Complexity                                                                      Risk       3           [] 2 or more self-limited or minor problems                      [] Limited                                                                        [] Low                  [] 1 stable chronic illness                                                  Any combination of the two                                               OTC drugs                  []Acute, uncomplicated illness or injury                            Review of prior external notes from unique source           Minor surgery with no risk factors                                                                                                               [] 1 []2  []3+                                                                                                              Review of results from each unique test                                                                                                               [] 1 []2  [] 3+                                                                                                              Order of each unique test                                                                                                                [] 1 []2  [] 3+                                                                                                              Or                                                                                                             [] Assessment requiring an independent historian      4            [] One or more chronic illness with exacerbation,              [] Moderate                                                                      [] Moderate                 Progression, or side effects of treatment                            -test documents or independent historians                        Prescription drug management                []  2 or more sable chronic illnesses                                    [] Independent interpretation of tests                              Minor surgery with identifiable risk                [] 1 undiagnosed new problem with uncertain prognosis    [] Discussion or management of test results                    elective major surgery                [] 1 acute illness with                systemic symptoms                                                                                                                                                              [] 1 acute complicated injury                                                                                                                                          Elective major surgery                                                                                                                                                                                                                                                                                                                                                                                                  5            [x] 1 or more chronic illnesses with severe exacerbation,     [x]  Extensive(two from below)                                         [x] High                                                                                                               [] Independent interpretation of results                         Drug therapy requiring intensive                                                                                                               []Discussion of management or test interpretation           monitoring                                                                                                                                                                                                       Decision to de-escalate care                 [] 1 acute or chronic illness or injury that poses a threat                                                                                               Decision regarding hospitalization

## 2024-02-15 ENCOUNTER — LAB VISIT (OUTPATIENT)
Dept: LAB | Facility: HOSPITAL | Age: 64
End: 2024-02-15
Attending: SPECIALIST
Payer: MEDICARE

## 2024-02-15 ENCOUNTER — OFFICE VISIT (OUTPATIENT)
Dept: ALLERGY | Facility: CLINIC | Age: 64
End: 2024-02-15
Payer: MEDICARE

## 2024-02-15 VITALS
HEART RATE: 81 BPM | WEIGHT: 153.88 LBS | SYSTOLIC BLOOD PRESSURE: 113 MMHG | BODY MASS INDEX: 30.05 KG/M2 | TEMPERATURE: 98 F | DIASTOLIC BLOOD PRESSURE: 72 MMHG

## 2024-02-15 DIAGNOSIS — J40 BRONCHITIS: ICD-10-CM

## 2024-02-15 DIAGNOSIS — J44.9 CHRONIC OBSTRUCTIVE PULMONARY DISEASE, UNSPECIFIED COPD TYPE: ICD-10-CM

## 2024-02-15 DIAGNOSIS — J44.89 ASTHMA WITH COPD: ICD-10-CM

## 2024-02-15 DIAGNOSIS — Z87.891 EX-CIGARETTE SMOKER: ICD-10-CM

## 2024-02-15 DIAGNOSIS — J47.9 ADULT BRONCHIECTASIS: ICD-10-CM

## 2024-02-15 DIAGNOSIS — L29.9 ITCHING: ICD-10-CM

## 2024-02-15 DIAGNOSIS — R53.81 MALAISE: ICD-10-CM

## 2024-02-15 DIAGNOSIS — R06.09 EXERTIONAL DYSPNEA: ICD-10-CM

## 2024-02-15 DIAGNOSIS — D72.10 EOSINOPHILIA, UNSPECIFIED TYPE: ICD-10-CM

## 2024-02-15 DIAGNOSIS — R53.82 CHRONIC FATIGUE, UNSPECIFIED: ICD-10-CM

## 2024-02-15 DIAGNOSIS — E78.2 MIXED HYPERLIPIDEMIA: ICD-10-CM

## 2024-02-15 DIAGNOSIS — K59.00 CONSTIPATION, UNSPECIFIED CONSTIPATION TYPE: ICD-10-CM

## 2024-02-15 DIAGNOSIS — I10 ESSENTIAL HYPERTENSION: ICD-10-CM

## 2024-02-15 DIAGNOSIS — J44.89 COPD WITH ASTHMA: Primary | ICD-10-CM

## 2024-02-15 DIAGNOSIS — R05.9 COUGH, UNSPECIFIED TYPE: ICD-10-CM

## 2024-02-15 LAB
BASOPHILS # BLD AUTO: 0.07 K/UL (ref 0–0.2)
BASOPHILS NFR BLD: 0.8 % (ref 0–1.9)
DIFFERENTIAL METHOD BLD: NORMAL
EOSINOPHIL # BLD AUTO: 0.3 K/UL (ref 0–0.5)
EOSINOPHIL NFR BLD: 4.1 % (ref 0–8)
ERYTHROCYTE [DISTWIDTH] IN BLOOD BY AUTOMATED COUNT: 13.5 % (ref 11.5–14.5)
HCT VFR BLD AUTO: 41.7 % (ref 37–48.5)
HGB BLD-MCNC: 14.3 G/DL (ref 12–16)
IGE SERPL-ACNC: 39 IU/ML (ref 0–100)
IMM GRANULOCYTES # BLD AUTO: 0.02 K/UL (ref 0–0.04)
IMM GRANULOCYTES NFR BLD AUTO: 0.2 % (ref 0–0.5)
LYMPHOCYTES # BLD AUTO: 1.8 K/UL (ref 1–4.8)
LYMPHOCYTES NFR BLD: 21.8 % (ref 18–48)
MCH RBC QN AUTO: 30.8 PG (ref 27–31)
MCHC RBC AUTO-ENTMCNC: 34.3 G/DL (ref 32–36)
MCV RBC AUTO: 90 FL (ref 82–98)
MONOCYTES # BLD AUTO: 0.5 K/UL (ref 0.3–1)
MONOCYTES NFR BLD: 6.5 % (ref 4–15)
NEUTROPHILS # BLD AUTO: 5.5 K/UL (ref 1.8–7.7)
NEUTROPHILS NFR BLD: 66.6 % (ref 38–73)
NRBC BLD-RTO: 0 /100 WBC
PLATELET # BLD AUTO: 294 K/UL (ref 150–450)
PMV BLD AUTO: 10.2 FL (ref 9.2–12.9)
RBC # BLD AUTO: 4.65 M/UL (ref 4–5.4)
WBC # BLD AUTO: 8.25 K/UL (ref 3.9–12.7)

## 2024-02-15 PROCEDURE — 86036 ANCA SCREEN EACH ANTIBODY: CPT | Mod: 59 | Performed by: SPECIALIST

## 2024-02-15 PROCEDURE — 86317 IMMUNOASSAY INFECTIOUS AGENT: CPT | Mod: 59 | Performed by: SPECIALIST

## 2024-02-15 PROCEDURE — 1159F MED LIST DOCD IN RCRD: CPT | Mod: CPTII,S$GLB,, | Performed by: SPECIALIST

## 2024-02-15 PROCEDURE — 3008F BODY MASS INDEX DOCD: CPT | Mod: CPTII,S$GLB,, | Performed by: SPECIALIST

## 2024-02-15 PROCEDURE — 99999 PR PBB SHADOW E&M-EST. PATIENT-LVL V: CPT | Mod: PBBFAC,,, | Performed by: SPECIALIST

## 2024-02-15 PROCEDURE — 86140 C-REACTIVE PROTEIN: CPT | Performed by: SPECIALIST

## 2024-02-15 PROCEDURE — 85652 RBC SED RATE AUTOMATED: CPT | Performed by: SPECIALIST

## 2024-02-15 PROCEDURE — 83520 IMMUNOASSAY QUANT NOS NONAB: CPT | Performed by: SPECIALIST

## 2024-02-15 PROCEDURE — 4010F ACE/ARB THERAPY RXD/TAKEN: CPT | Mod: CPTII,S$GLB,, | Performed by: SPECIALIST

## 2024-02-15 PROCEDURE — 86147 CARDIOLIPIN ANTIBODY EA IG: CPT | Performed by: SPECIALIST

## 2024-02-15 PROCEDURE — 86162 COMPLEMENT TOTAL (CH50): CPT | Performed by: SPECIALIST

## 2024-02-15 PROCEDURE — 3078F DIAST BP <80 MM HG: CPT | Mod: CPTII,S$GLB,, | Performed by: SPECIALIST

## 2024-02-15 PROCEDURE — 99205 OFFICE O/P NEW HI 60 MIN: CPT | Mod: S$GLB,,, | Performed by: SPECIALIST

## 2024-02-15 PROCEDURE — 82785 ASSAY OF IGE: CPT | Performed by: SPECIALIST

## 2024-02-15 PROCEDURE — 1160F RVW MEDS BY RX/DR IN RCRD: CPT | Mod: CPTII,S$GLB,, | Performed by: SPECIALIST

## 2024-02-15 PROCEDURE — 85025 COMPLETE CBC W/AUTO DIFF WBC: CPT | Performed by: SPECIALIST

## 2024-02-15 PROCEDURE — 82784 ASSAY IGA/IGD/IGG/IGM EACH: CPT | Mod: 59 | Performed by: SPECIALIST

## 2024-02-15 PROCEDURE — 3074F SYST BP LT 130 MM HG: CPT | Mod: CPTII,S$GLB,, | Performed by: SPECIALIST

## 2024-02-16 LAB
CRP SERPL-MCNC: 19.5 MG/L (ref 0–8.2)
ERYTHROCYTE [SEDIMENTATION RATE] IN BLOOD BY PHOTOMETRIC METHOD: 19 MM/HR (ref 0–36)
IGA SERPL-MCNC: 213 MG/DL (ref 40–350)
IGG SERPL-MCNC: 690 MG/DL (ref 650–1600)
IGM SERPL-MCNC: 53 MG/DL (ref 50–300)

## 2024-02-17 LAB
TETANUS TOXOID IGG AB: POSITIVE
TETANUS TOXOID IGG VALUE: 0.53 IU/ML

## 2024-02-19 LAB
ANCA AB TITR SER IF: NORMAL TITER
CH50 SERPL-ACNC: >99 U/ML (ref 42–95)
P-ANCA TITR SER IF: NORMAL TITER
TRYPTASE LEVEL: 6.5 NG/ML

## 2024-02-20 LAB
CARDIOLIPIN IGG SER IA-ACNC: <9.4 GPL (ref 0–14.99)
CARDIOLIPIN IGM SER IA-ACNC: <9.4 MPL (ref 0–12.49)

## 2024-02-22 LAB
IMMUNOLOGIST REVIEW: NORMAL
S PN DA SERO 19F IGG SER-MCNC: 3.5 MCG/ML
S PNEUM DA 1 IGG SER-MCNC: 2.3 MCG/ML
S PNEUM DA 10A IGG SER-MCNC: 0.5 MCG/ML
S PNEUM DA 11A IGG SER-MCNC: 0.3 MCG/ML
S PNEUM DA 12F IGG SER-MCNC: 0.1 MCG/ML
S PNEUM DA 14 IGG SER-MCNC: 1 MCG/ML
S PNEUM DA 15B IGG SER-MCNC: 1.4 MCG/ML
S PNEUM DA 17F IGG SER-MCNC: 0.4 MCG/ML
S PNEUM DA 18C IGG SER-MCNC: 0.2 MCG/ML
S PNEUM DA 19A IGG SER-MCNC: 0.8 MCG/ML
S PNEUM DA 2 IGG SER-MCNC: 0.2 MCG/ML
S PNEUM DA 20A IGG SER-MCNC: 2.8 MCG/ML
S PNEUM DA 22F IGG SER-MCNC: 0.5 MCG/ML
S PNEUM DA 23F IGG SER-MCNC: 0.5 MCG/ML
S PNEUM DA 3 IGG SER-MCNC: 0.1 MCG/ML
S PNEUM DA 33F IGG SER-MCNC: 1 MCG/ML
S PNEUM DA 4 IGG SER-MCNC: 0.1 MCG/ML
S PNEUM DA 5 IGG SER-MCNC: 0.1 MCG/ML
S PNEUM DA 6B IGG SER-MCNC: 4.6 MCG/ML
S PNEUM DA 7F IGG SER-MCNC: 0.4 MCG/ML
S PNEUM DA 8 IGG SER-MCNC: 0.4 MCG/ML
S PNEUM DA 9N IGG SER-MCNC: 0.5 MCG/ML
S PNEUM DA 9V IGG SER-MCNC: 0.4 MCG/ML

## 2024-02-27 ENCOUNTER — PATIENT MESSAGE (OUTPATIENT)
Dept: ADMINISTRATIVE | Facility: OTHER | Age: 64
End: 2024-02-27
Payer: MEDICARE

## 2024-02-28 ENCOUNTER — PATIENT OUTREACH (OUTPATIENT)
Dept: PULMONOLOGY | Facility: CLINIC | Age: 64
End: 2024-02-28
Payer: MEDICARE

## 2024-02-28 NOTE — TELEPHONE ENCOUNTER
Chronic Disease Management  Called regarding completed Pulmonary Disease Management Questionnaire.  She states she coughs off and on. She will see allergy doctor next week.   Time  spent with patient:  Minutes

## 2024-03-05 PROBLEM — Z99.81 ON HOME OXYGEN THERAPY: Status: ACTIVE | Noted: 2024-03-05

## 2024-03-05 PROBLEM — D72.19 PERIPHERAL EOSINOPHILIA: Status: ACTIVE | Noted: 2024-03-05

## 2024-03-05 PROBLEM — J82.83 EOSINOPHILIC ASTHMA: Status: ACTIVE | Noted: 2021-06-18

## 2024-03-05 PROBLEM — L30.9 ECZEMA: Status: ACTIVE | Noted: 2024-03-05

## 2024-03-05 PROBLEM — Z87.891 EX-CIGARETTE SMOKER: Status: ACTIVE | Noted: 2024-03-05

## 2024-03-05 PROBLEM — J31.0 PERENNIAL NON-ALLERGIC RHINITIS: Status: ACTIVE | Noted: 2024-03-05

## 2024-03-05 PROBLEM — R53.83 MALAISE AND FATIGUE: Status: ACTIVE | Noted: 2024-02-15

## 2024-03-06 ENCOUNTER — OFFICE VISIT (OUTPATIENT)
Dept: ALLERGY | Facility: CLINIC | Age: 64
End: 2024-03-06
Payer: MEDICARE

## 2024-03-06 VITALS
WEIGHT: 152.75 LBS | TEMPERATURE: 98 F | DIASTOLIC BLOOD PRESSURE: 68 MMHG | HEART RATE: 87 BPM | SYSTOLIC BLOOD PRESSURE: 107 MMHG | BODY MASS INDEX: 29.84 KG/M2

## 2024-03-06 DIAGNOSIS — R53.81 MALAISE AND FATIGUE: ICD-10-CM

## 2024-03-06 DIAGNOSIS — I10 ESSENTIAL HYPERTENSION: ICD-10-CM

## 2024-03-06 DIAGNOSIS — J44.89 ASTHMA-COPD OVERLAP SYNDROME: Primary | ICD-10-CM

## 2024-03-06 DIAGNOSIS — L30.9 ECZEMA, UNSPECIFIED TYPE: ICD-10-CM

## 2024-03-06 DIAGNOSIS — R53.83 MALAISE AND FATIGUE: ICD-10-CM

## 2024-03-06 DIAGNOSIS — Z87.891 EX-CIGARETTE SMOKER: ICD-10-CM

## 2024-03-06 DIAGNOSIS — D72.19 PERIPHERAL EOSINOPHILIA: ICD-10-CM

## 2024-03-06 DIAGNOSIS — Z99.81 ON HOME OXYGEN THERAPY: ICD-10-CM

## 2024-03-06 DIAGNOSIS — R05.9 COUGH IN ADULT: ICD-10-CM

## 2024-03-06 DIAGNOSIS — J82.83 EOSINOPHILIC ASTHMA: ICD-10-CM

## 2024-03-06 DIAGNOSIS — J31.0 PERENNIAL NON-ALLERGIC RHINITIS: ICD-10-CM

## 2024-03-06 DIAGNOSIS — E78.2 MIXED HYPERLIPIDEMIA: ICD-10-CM

## 2024-03-06 DIAGNOSIS — J47.9 ADULT BRONCHIECTASIS: ICD-10-CM

## 2024-03-06 PROCEDURE — 3074F SYST BP LT 130 MM HG: CPT | Mod: CPTII,S$GLB,, | Performed by: SPECIALIST

## 2024-03-06 PROCEDURE — 3078F DIAST BP <80 MM HG: CPT | Mod: CPTII,S$GLB,, | Performed by: SPECIALIST

## 2024-03-06 PROCEDURE — 90732 PPSV23 VACC 2 YRS+ SUBQ/IM: CPT | Mod: S$GLB,,, | Performed by: SPECIALIST

## 2024-03-06 PROCEDURE — 3008F BODY MASS INDEX DOCD: CPT | Mod: CPTII,S$GLB,, | Performed by: SPECIALIST

## 2024-03-06 PROCEDURE — 99215 OFFICE O/P EST HI 40 MIN: CPT | Mod: 25,S$GLB,, | Performed by: SPECIALIST

## 2024-03-06 PROCEDURE — 99999 PR PBB SHADOW E&M-EST. PATIENT-LVL IV: CPT | Mod: PBBFAC,,, | Performed by: SPECIALIST

## 2024-03-06 PROCEDURE — 1160F RVW MEDS BY RX/DR IN RCRD: CPT | Mod: CPTII,S$GLB,, | Performed by: SPECIALIST

## 2024-03-06 PROCEDURE — 1159F MED LIST DOCD IN RCRD: CPT | Mod: CPTII,S$GLB,, | Performed by: SPECIALIST

## 2024-03-06 PROCEDURE — 4010F ACE/ARB THERAPY RXD/TAKEN: CPT | Mod: CPTII,S$GLB,, | Performed by: SPECIALIST

## 2024-03-06 PROCEDURE — G0009 ADMIN PNEUMOCOCCAL VACCINE: HCPCS | Mod: S$GLB,,, | Performed by: SPECIALIST

## 2024-03-06 RX ORDER — DUPILUMAB 300 MG/2ML
300 INJECTION, SOLUTION SUBCUTANEOUS ONCE
Qty: 2 ML | Refills: 0 | Status: ACTIVE | OUTPATIENT
Start: 2024-03-06 | End: 2024-03-06

## 2024-03-06 RX ORDER — DUPILUMAB 300 MG/2ML
300 INJECTION, SOLUTION SUBCUTANEOUS
Qty: 4 ML | Refills: 11 | Status: ACTIVE | OUTPATIENT
Start: 2024-03-06 | End: 2025-04-09

## 2024-03-06 NOTE — PROGRESS NOTES
"Subjective:       Patient ID: Kenya Majano is a 63 y.o. female.    Chief Complaint:    Was seen as a new patient  on 02- 15- 2024-- ( see new patient notes )referred by Dr. Rick Sanchez MD, pulmonologist.  She has ASTHMA- COPD ---, SEA- SEVERE EOSINOPHILIC ASTHMA, NON ALLERGIC RHINITIS ? GERD (per ENT Dr Babb ) -- T2 ASTHMA    HPI:  ACCOMPANIED BY HER .- I HAVE GIVEN WRITTEN INSTRUCTIONS ABOUT THE LAB TESTS RESULTS AND THE MANAGEMENT PLAN.   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  See the " notes and treatment plan by Dr Sanchez ) ---plan that Dr Sanchez has recommended   and I am recommending today after reviewing the results of the tests ordered by me.    She will need continued follow up with the pulmonologist. I recommend starting her on Anti IL- 4- R- alpha-- Dupixent in addition to her current treatment plan to treat   For- Severe Eosinophilic Asthma. --- SEA-- I would start her on Anti- IL4- R- alpha-- Dupixent 600 mg loading dose and 300 mg q 14 days thereafter.  She will stay on all the medications prescribed by Dr Rick Sanchez and follow up with him.  I would later start her on Azithromycin 250 mg qd -- prophylaxis.  Will immunize her with Pneumovax 23  today and get vaccination response in 4- 8- 12 weeks.    She has generalized eczematous , extremely pruritic lesions- which will improve when she is on Dupixent. Also need coconut oil before and after bath and Vaseline , Aquaphor / Cera VE bid and spot treat with Crtizone- 10 ointment.  Lukewarm showers or baths recommended.  Is an ex cigarette smoker.  She has Oxygen concentrator 2 Liters / minute for nights    The patient and  understood the instructions and verbalized about it.       Sulfa (sulfonamide antibiotics), Dimenhydrinate, Sumatriptan, and Latex --- allergies were reported.    Past Medical History:   Diagnosis Date    Adult bronchiectasis 7/6/2023    Anxiety "     Arthritis     Asthma     Cataract     Coronary artery disease     Emphysema of lung     Glaucoma     Hypertension     Seizures     Stroke        Family History   Problem Relation Age of Onset    Diabetes Mother     Stroke Mother     Heart disease Father     Diabetes Maternal Grandmother     No Known Problems Maternal Grandfather     Hearing loss Paternal Grandmother     Hearing loss Paternal Grandfather        Environmental History:      Review of Systems   Constitutional:  Positive for fatigue. Negative for fever.   HENT:  Positive for congestion, postnasal drip and rhinorrhea. Negative for ear pain, sinus pressure, sinus pain, sneezing and sore throat.    Eyes: Negative.  Negative for redness and itching.   Respiratory:  Positive for cough, chest tightness and shortness of breath. Negative for wheezing.    Cardiovascular: Negative.  Negative for chest pain.   Gastrointestinal: Negative.  Negative for nausea.   Endocrine: Negative.  Negative for cold intolerance.   Genitourinary: Negative.  Negative for frequency.   Musculoskeletal: Negative.  Negative for myalgias.   Skin: Negative.  Negative for rash.   Allergic/Immunologic: Negative.  Negative for environmental allergies, food allergies and immunocompromised state.   Neurological: Negative.  Negative for dizziness and headaches.   Hematological: Negative.  Negative for adenopathy.   Psychiatric/Behavioral: Negative.  Negative for sleep disturbance.      Objective:     Visit Vitals  LMP  (LMP Unknown)       Physical Exam  Vitals and nursing note reviewed.   Constitutional:       Appearance: Normal appearance. She is normal weight.   HENT:      Head: Normocephalic and atraumatic.      Right Ear: Tympanic membrane and external ear normal.      Left Ear: Tympanic membrane, ear canal and external ear normal.      Nose: Congestion present.      Mouth/Throat:      Mouth: Mucous membranes are moist.      Pharynx: Oropharynx is clear.   Eyes:      Extraocular  Movements: Extraocular movements intact.      Conjunctiva/sclera: Conjunctivae normal.      Pupils: Pupils are equal, round, and reactive to light.   Cardiovascular:      Rate and Rhythm: Normal rate and regular rhythm.      Pulses: Normal pulses.      Heart sounds: Normal heart sounds.   Pulmonary:      Effort: Pulmonary effort is normal.      Breath sounds: Normal breath sounds.   Abdominal:      General: Abdomen is flat. Bowel sounds are normal.      Palpations: Abdomen is soft.   Musculoskeletal:         General: Normal range of motion.      Cervical back: Normal range of motion and neck supple.   Skin:     General: Skin is warm and dry.      Capillary Refill: Capillary refill takes less than 2 seconds.   Neurological:      General: No focal deficit present.      Mental Status: She is alert and oriented to person, place, and time. Mental status is at baseline.   Psychiatric:         Mood and Affect: Mood normal.         Behavior: Behavior normal.         Thought Content: Thought content normal.         Judgment: Judgment normal.       Assessment:      1. Asthma-COPD overlap syndrome    2. Peripheral eosinophilia    3. Cough in adult    4. Perennial non-allergic rhinitis    5. Adult bronchiectasis    6. Ex-cigarette smoker    7. Eosinophilic asthma    8. Essential hypertension    9. Mixed hyperlipidemia    10. Eczema, unspecified type    11. Malaise and fatigue    12. On home oxygen therapy    13      T2 ASTHMA-- FeNO-- 96 ppb- Dr. Sanchez on 11- 02 2023-- PFT-- on the same day- NO improvement in FEV 1 , after bronchodilator use            Dr Sanchez- NON - ALLERGIC RHINITIS- BY REGION- 6 RAST  14      Poor exercise tolerance--  15      Obesity  BMI 30.57     Plan:       May start on Dupixent 300 MG Q 14 DAYS AFTER LOADING DOSE  mg.  ------------------------------------------------------------------------------------------------------------------  Immunize with PPSV- 23 - and get post titers 4 weeks later.  She  had one PCV- 13 ON 01- 19- 2021  --------------------------------------------------------------------------------------------    REVIEWED AND DISCUSSED ALL THE LABORATORY TESTS ORDERED BY ME ON 2-15- 2024--  Abnormal results include CBC-- with AEC   338 K , Pneumococcal antibody titers -- non protective 18 l Q23 sero types and elevated CRP 19.57 ( 0.00- 8.2 K ).  -------------------------------------------------------------------------------------------------------------------------------------------------------------------------------------------------  NORMAL-- baseline Tryptase 6.5, IgE 39 ( 0- 100 UNITS ), CH 50, ANCA, SED RATE, IgG, A and M TETANUS AND ANTI PHOSPHOLIPID ANTIBODIES  --------------------------------------------------------------------------------------------------------------------------------------------------------------------------------------------------------------------  Consider Azithromycin 250 mg prophylaxis.-- LATER-- - MAY BE NEXT VISIT---    --------------------------------------------------------------------------------------------------------------------------------------------------------------------------------------------------------------------  Portable home O2  2 Liters in the last year.  Trelegy 200- 62.5- 25-- one puff daily.  Duoneb one unit tid prn,  Pepcid 40 mg  -- by Dr Babb for COPD  Zyrtec 10 mg  Flonase 50 Mcg qd / bid  ----------------------------------------------------------------------------------  Follow up with Rick Sanchez MD -- Pulmonologist--   As an ex cigarette smoker, must be screened for lung cancer per protocol  Received -- AREXVY vaccine  Annual influenza vaccination.  FOLLOW UP IN 2 - MONTHS

## 2024-03-07 ENCOUNTER — TELEPHONE (OUTPATIENT)
Dept: ALLERGY | Facility: CLINIC | Age: 64
End: 2024-03-07
Payer: MEDICARE

## 2024-03-07 DIAGNOSIS — J82.83 EOSINOPHILIC ASTHMA: Primary | ICD-10-CM

## 2024-03-07 RX ORDER — DUPILUMAB 300 MG/2ML
600 INJECTION, SOLUTION SUBCUTANEOUS ONCE
Qty: 4 ML | Refills: 0 | Status: ACTIVE | OUTPATIENT
Start: 2024-03-07 | End: 2024-03-07

## 2024-03-08 NOTE — TELEPHONE ENCOUNTER
----- Message from Savanna Arcos PharmD sent at 3/7/2024  4:44 PM CST -----  Regarding: Dupixent  Good afternoon,    [facility] received an order for:   Dupixent 300mg, dispense 2ml, inject 2mls (300mg) into the skin for 1 dose, as the loading dose.  Could you resend to update the loading dose as:  Dupixent 300mg, dispense 4mls, inject 4mls (600mg) into the skin for 1 dose.    Thanks in advance.     Regards,  Savanna Arcos, PharmD   Clinical Pharmacist  [facility]   (P): [phone]   (F): [phone]

## 2024-04-03 ENCOUNTER — PATIENT MESSAGE (OUTPATIENT)
Dept: PULMONOLOGY | Facility: CLINIC | Age: 64
End: 2024-04-03
Payer: MEDICARE

## 2024-04-16 ENCOUNTER — CLINICAL SUPPORT (OUTPATIENT)
Dept: ALLERGY | Facility: CLINIC | Age: 64
End: 2024-04-16
Payer: MEDICARE

## 2024-04-16 DIAGNOSIS — J44.89 ASTHMA-COPD OVERLAP SYNDROME: Primary | ICD-10-CM

## 2024-04-16 NOTE — PROGRESS NOTES
Patient  instructed how to give Dupxent .  administered Dupixent with out difficulty.Patient waited in the lobby for 30 minted . No noted reaction

## 2024-04-27 ENCOUNTER — PATIENT MESSAGE (OUTPATIENT)
Dept: ADMINISTRATIVE | Facility: OTHER | Age: 64
End: 2024-04-27
Payer: MEDICARE

## 2024-05-03 ENCOUNTER — CLINICAL SUPPORT (OUTPATIENT)
Dept: PULMONOLOGY | Facility: CLINIC | Age: 64
End: 2024-05-03
Payer: MEDICARE

## 2024-05-03 ENCOUNTER — OFFICE VISIT (OUTPATIENT)
Dept: PULMONOLOGY | Facility: CLINIC | Age: 64
End: 2024-05-03
Payer: MEDICARE

## 2024-05-03 ENCOUNTER — HOSPITAL ENCOUNTER (OUTPATIENT)
Dept: RADIOLOGY | Facility: HOSPITAL | Age: 64
Discharge: HOME | End: 2024-05-03
Attending: INTERNAL MEDICINE
Payer: MEDICARE

## 2024-05-03 VITALS
WEIGHT: 150.81 LBS | HEIGHT: 60 IN | BODY MASS INDEX: 29.61 KG/M2 | SYSTOLIC BLOOD PRESSURE: 112 MMHG | DIASTOLIC BLOOD PRESSURE: 76 MMHG | OXYGEN SATURATION: 98 % | RESPIRATION RATE: 21 BRPM | HEART RATE: 80 BPM

## 2024-05-03 DIAGNOSIS — J44.1 COPD EXACERBATION: ICD-10-CM

## 2024-05-03 DIAGNOSIS — Z99.81 ON HOME OXYGEN THERAPY: ICD-10-CM

## 2024-05-03 DIAGNOSIS — J47.9 ADULT BRONCHIECTASIS: Chronic | ICD-10-CM

## 2024-05-03 DIAGNOSIS — J41.1 MUCOPURULENT CHRONIC BRONCHITIS: ICD-10-CM

## 2024-05-03 DIAGNOSIS — I10 ESSENTIAL HYPERTENSION: ICD-10-CM

## 2024-05-03 DIAGNOSIS — J41.8 MIXED SIMPLE AND MUCOPURULENT CHRONIC BRONCHITIS: ICD-10-CM

## 2024-05-03 DIAGNOSIS — R06.02 SOB (SHORTNESS OF BREATH) ON EXERTION: ICD-10-CM

## 2024-05-03 DIAGNOSIS — J84.10 CALCIFIED GRANULOMA OF LUNG: ICD-10-CM

## 2024-05-03 DIAGNOSIS — J44.89 ASTHMA WITH COPD: Primary | ICD-10-CM

## 2024-05-03 DIAGNOSIS — J82.83 EOSINOPHILIC ASTHMA: ICD-10-CM

## 2024-05-03 DIAGNOSIS — F17.210 CIGARETTE NICOTINE DEPENDENCE WITHOUT COMPLICATION: ICD-10-CM

## 2024-05-03 PROBLEM — J98.4 CALCIFIED GRANULOMA OF LUNG: Status: ACTIVE | Noted: 2024-05-03

## 2024-05-03 LAB
BRPFT: ABNORMAL
FEF 25 75 CHG: -0.7 %
FEF 25 75 LLN: 0.92
FEF 25 75 POST REF: 84.6 %
FEF 25 75 PRE REF: 85.2 %
FEF 25 75 REF: 1.88
FET100 CHG: -1.7 %
FEV1 CHG: -0.5 %
FEV1 FVC CHG: -0.5 %
FEV1 FVC LLN: 67
FEV1 FVC POST REF: 99.7 %
FEV1 FVC PRE REF: 100.2 %
FEV1 FVC REF: 79
FEV1 LLN: 1.52
FEV1 POST REF: 76.3 %
FEV1 PRE REF: 76.7 %
FEV1 REF: 2.04
FVC CHG: -0.1 %
FVC LLN: 1.93
FVC POST REF: 76.2 %
FVC PRE REF: 76.2 %
FVC REF: 2.59
PEF CHG: -1.5 %
PEF LLN: 3.94
PEF POST REF: 69.7 %
PEF PRE REF: 70.7 %
PEF REF: 5.44
POST FEF 25 75: 1.59 L/S (ref 0.92–2.84)
POST FET 100: 7.58 SEC
POST FEV1 FVC: 79.11 % (ref 66.64–92.03)
POST FEV1: 1.56 L (ref 1.52–2.57)
POST FVC: 1.97 L (ref 1.93–3.25)
POST PEF: 3.79 L/S (ref 3.94–6.94)
PRE FEF 25 75: 1.6 L/S (ref 0.92–2.84)
PRE FET 100: 7.7 SEC
PRE FEV1 FVC: 79.47 % (ref 66.64–92.03)
PRE FEV1: 1.57 L (ref 1.52–2.57)
PRE FVC: 1.97 L (ref 1.93–3.25)
PRE PEF: 3.85 L/S (ref 3.94–6.94)

## 2024-05-03 PROCEDURE — 71046 X-RAY EXAM CHEST 2 VIEWS: CPT | Mod: 26,,, | Performed by: STUDENT IN AN ORGANIZED HEALTH CARE EDUCATION/TRAINING PROGRAM

## 2024-05-03 PROCEDURE — 4010F ACE/ARB THERAPY RXD/TAKEN: CPT | Mod: CPTII,S$GLB,, | Performed by: NURSE PRACTITIONER

## 2024-05-03 PROCEDURE — 99999 PR PBB SHADOW E&M-EST. PATIENT-LVL V: CPT | Mod: PBBFAC,,, | Performed by: NURSE PRACTITIONER

## 2024-05-03 PROCEDURE — 94060 EVALUATION OF WHEEZING: CPT | Mod: S$GLB,,, | Performed by: INTERNAL MEDICINE

## 2024-05-03 PROCEDURE — 99999 PR PBB SHADOW E&M-EST. PATIENT-LVL I: CPT | Mod: PBBFAC,,,

## 2024-05-03 PROCEDURE — 3074F SYST BP LT 130 MM HG: CPT | Mod: CPTII,S$GLB,, | Performed by: NURSE PRACTITIONER

## 2024-05-03 PROCEDURE — 3008F BODY MASS INDEX DOCD: CPT | Mod: CPTII,S$GLB,, | Performed by: NURSE PRACTITIONER

## 2024-05-03 PROCEDURE — 71046 X-RAY EXAM CHEST 2 VIEWS: CPT | Mod: TC

## 2024-05-03 PROCEDURE — 3078F DIAST BP <80 MM HG: CPT | Mod: CPTII,S$GLB,, | Performed by: NURSE PRACTITIONER

## 2024-05-03 PROCEDURE — 1160F RVW MEDS BY RX/DR IN RCRD: CPT | Mod: CPTII,S$GLB,, | Performed by: NURSE PRACTITIONER

## 2024-05-03 PROCEDURE — 1159F MED LIST DOCD IN RCRD: CPT | Mod: CPTII,S$GLB,, | Performed by: NURSE PRACTITIONER

## 2024-05-03 PROCEDURE — 99215 OFFICE O/P EST HI 40 MIN: CPT | Mod: 25,S$GLB,, | Performed by: NURSE PRACTITIONER

## 2024-05-03 RX ORDER — PREDNISOLONE ACETATE 10 MG/ML
1 SUSPENSION/ DROPS OPHTHALMIC
COMMUNITY
Start: 2024-04-04

## 2024-05-03 RX ORDER — ALBUTEROL SULFATE 0.83 MG/ML
2.5 SOLUTION RESPIRATORY (INHALATION) EVERY 4 HOURS PRN
Qty: 360 ML | Refills: 11 | Status: SHIPPED | OUTPATIENT
Start: 2024-05-03 | End: 2025-05-03

## 2024-05-03 NOTE — ASSESSMENT & PLAN NOTE
The current medical regimen is over all effective;  continue present plan and medications.Trelegy 200 mcg, Albuterol inhaler, Albuterol nebs. Duo nebs.   Add Accapella or Aerobika flutter device for mucous in morning and after noon, obtained 5/3/2024 with instruction with PDM. Continued follow up with pul dis mgmt. Begin regular exercise, walking or gym 4-5 days a week.

## 2024-05-03 NOTE — PROGRESS NOTES
Subjective:      Patient ID: Kenya Majano is a 63 y.o. female.    Chief Complaint: COPD       Kenya Majano presents to pulmonary clinic for follow up on COPD and Esinophic asthma on Dupixent pen review spirometry.   Last seen in pulmonary clinic by Dr. Sanchez.  She states that she  is at her respiratory baseline and denies new onset of pulmonary complaints, she actually feels improve from when initial diagnosis and over past month feels doing well on current regimen.   She admits to occasional  cough,  sputum, and wheezing, improves with Albuterol inhaler or nebs and current regimen.  Her current respiratory regimen: Albuterol MDI(or generic equivalent) and Trelegy Ellipta 200 mcg, Dupixent pen. On NC 2 L activity and sleep has POC. 10/2023 6mwd no desat requiring o2 at rest or exertion.   Presents on home O2 NC 2 L POC, using mainly when walking a long distance and at night for sleep.  She does use medications compliantly.     5/3/2024 Spirometry is normal. Spirometry remains unimproved following bronchodilator.     48 pack year former smoker. Quit January 2023.   7/10/2023 LDCT Benign Appearance or Behavior - continue annual screening with LDCT in 12 months, July 2024. Patient is agreeable to this plan of care, joint decision making.     COPD Questionnaire  How often do you cough?: Some of the time  How often do you have phlegm (mucus) in your chest?: Some of the time  How often does your chest feel tight?: Some of the time  When you walk up a hill or one flight of stairs, how often are you breathless?: Some of the time  How often are you limited doing any activities at home?: Some of the time  How often are you confident leaving the house despite your lung condition?: Some of the time  How often do you sleep soundly?: A little of the time  How often do you have energy?: Some of the time  Total score: 22     The following portions of the patient's history were reviewed and updated as  appropriate: allergies, current medications, past family history, past medical history, past social history, past surgical history, and problem list.    Previous Report Reviewed: lab reports, office notes, and radiology reports     Past Medical History: The following portions of the patient's history were reviewed and updated as appropriate:   She  has a past surgical history that includes  section; Appendectomy; Cholecystectomy; Eye surgery; Hysterectomy; and Oophorectomy.  Her family history includes Diabetes in her maternal grandmother and mother; Hearing loss in her paternal grandfather and paternal grandmother; Heart disease in her father; No Known Problems in her maternal grandfather; Stroke in her mother.  She  reports that she quit smoking about 16 months ago. Her smoking use included cigarettes. She started smoking about 49 years ago. She has a 48 pack-year smoking history. She has never been exposed to tobacco smoke. She has never used smokeless tobacco. She reports that she does not drink alcohol and does not use drugs.  She has a current medication list which includes the following prescription(s): albuterol, albuterol-ipratropium, bimatoprost, brinzolamide-brimonidine, carbamazepine, clonazepam, clopidogrel, dupixent pen, famotidine, fluticasone propionate, trelegy ellipta, linzess, lisinopril-hydrochlorothiazide, montelukast, prednisolone acetate, prednisone, promethazine-dextromethorphan, sertraline, sodium chloride 0.9%, verapamil, albuterol, ezetimibe, and rosuvastatin.  She is allergic to sulfa (sulfonamide antibiotics), dimenhydrinate, sumatriptan, and latex..    Review of Systems   Constitutional:  Negative for fever, chills, weight loss, weight gain, activity change, appetite change, fatigue and night sweats.   HENT:  Negative for postnasal drip, rhinorrhea, sinus pressure, voice change and congestion.    Eyes:  Negative for redness and itching.   Respiratory:  Positive for cough (in  morning, improves during day), sputum production (whitish in morning) and wheezing (mostly at night, sometimes during day). Negative for snoring, chest tightness, shortness of breath, orthopnea, asthma nighttime symptoms, dyspnea on extertion, use of rescue inhaler and somnolence.    Cardiovascular: Negative.  Negative for chest pain, palpitations and leg swelling.   Genitourinary:  Negative for difficulty urinating and hematuria.   Endocrine:  Negative for cold intolerance and heat intolerance.    Musculoskeletal:  Negative for arthralgias, gait problem, joint swelling and myalgias.   Skin: Negative.    Gastrointestinal:  Negative for nausea, vomiting, abdominal pain and acid reflux.   Neurological:  Negative for dizziness, weakness, light-headedness and headaches.   Hematological:  Negative for adenopathy. No excessive bruising.   All other systems reviewed and are negative.     Objective:   /76   Pulse 80   Resp (!) 21   Ht 5' (1.524 m)   Wt 68.4 kg (150 lb 12.7 oz)   LMP  (LMP Unknown)   SpO2 98% Comment: 2 LPM  BMI 29.45 kg/m²   Physical Exam  Vitals and nursing note reviewed.   Constitutional:       General: She is not in acute distress.     Appearance: Normal appearance. She is well-developed. She is not ill-appearing or toxic-appearing.   HENT:      Head: Normocephalic.      Right Ear: External ear normal.      Left Ear: External ear normal.      Nose: Nose normal.      Mouth/Throat:      Pharynx: No oropharyngeal exudate.   Eyes:      Conjunctiva/sclera: Conjunctivae normal.   Cardiovascular:      Rate and Rhythm: Normal rate and regular rhythm.      Heart sounds: Normal heart sounds.   Pulmonary:      Effort: Pulmonary effort is normal.      Breath sounds: Normal breath sounds. No stridor.   Abdominal:      Palpations: Abdomen is soft.   Musculoskeletal:         General: Normal range of motion.      Cervical back: Normal range of motion and neck supple.   Lymphadenopathy:      Cervical: No  cervical adenopathy.   Skin:     General: Skin is warm and dry.   Neurological:      Mental Status: She is alert and oriented to person, place, and time.   Psychiatric:         Behavior: Behavior normal. Behavior is cooperative.         Thought Content: Thought content normal.         Judgment: Judgment normal.       Personal Diagnostic Review  X-Ray Chest PA And Lateral  Narrative: EXAM: XR CHEST PA AND LATERAL    CLINICAL HISTORY: SOB; [J44.1]-Chronic obstructive pulmonary disease with (acute) exacerbation.    COMPARISON:  CT Low-Dose Lung Cancer Screening 07/10/2023    FINDINGS:  There is biapical pleural-parenchymal scarring.  There is a lateral right lower lobe calcified granuloma.  There is prominent left pericardial fat pad.  No confluent airspace opacity or consolidation.  There is no pleural effusion or pneumothorax.  Cardiomediastinal silhouette is within normal limits.       No acute osseous abnormality.  There is dextrocurvature of the spine.  Cholecystectomy clips are present.  Impression:  No acute radiographic abnormality in the chest.    Report Date: 5/3/2024 8:20 AM    Finalized on: 5/3/2024 8:20 AM By:  Lianna Lorenz MD  [facility]# [id number]      2024-05-03 08:22:[id number]    [facility]      5/3/2024 Spirometry is normal. Spirometry remains unimproved following bronchodilator.     11/2/2023 6MWD No desaturations requiring supplemental oxygen at rest or exertion.    Phase Oxygen Assessment Supplemental O2 Heart   Rate Blood Pressure Mookie Dyspnea Scale Rating   Resting 97 % Room Air 81 bpm 139/65 0   Exercise             Minute             1 98 % Room Air 88 bpm       2 99 % Room Air 106 bpm       3 97 % Room Air 108 bpm       4 95 % Room Air 115 bpm       5 96 % Room Air 110 bpm       6  98 % Room Air 112 bpm 144/60 4   Recovery             Minute             1 99 % Room Air 99 bpm       2 97 % Room Air 88 bpm       3 98 % Room Air 87 bpm       4 98 % Room Air 86 bpm 98/53 1      Six Minute Walk Summary  6MWT  Status: completed without stopping  Patient Reported: Dyspnea, Other (Comment) (hip and leg pain)         Interpretation:  Did the patient stop or pause?: No  Total Time Walked (Calculated): 360 seconds  Final Partial Lap Distance (feet): 0 feet  Total Distance Meters (Calculated): 365.76 meters  Predicted Distance Meters (Calculated): 457.89 meters  Percentage of Predicted (Calculated): 79.88  Peak VO2 (Calculated): 14.95  Mets: 4.27  Has The Patient Had a Previous Six Minute Walk Test?: Yes     Previous 6MWT Results  Has The Patient Had a Previous Six Minute Walk Test?: Yes  Date of Previous Test: 02/23/23  Total Time Walked: 360 seconds  Total Distance (meters): 365.76  Predicted Distance (meters): 478.78 meters  Percentage of Predicted: 76.39  Percent Change (Calculated): 0     Interpretation:  Total distance walked in six minutes is mildly reduced indicating a reduction in overall  functional capacity. The patient did not meet criteria for supplemental oxygen prescription.  Clinical correlation suggested.  [] Mild exercise-induced hypoxemia described as an arterial oxygen saturation of 93-95% (with a fall of 3-4% with exercise),   [] Moderate exercise-induced hypoxemia as a fall in oxygen saturation to  89-93% (with a fall of 3-4 % with exercise)  [] Severe exercise induced hypoxemia as < 89% O2 saturation (88% and below).  Medicare Criteria for Oxygen prescription comments: When arterial oxygen saturation is at or below 88% during exercise (severe exercise induced hypoxemia) then the patient falls under   Details about Medicare Group Criteria coverage can be found at http://www.cms.hhs.gov/manuals/downloads/      7/10/2023 LDCT    EXAMINATION:  CT CHEST LUNG SCREENING LOW DOSE     CLINICAL HISTORY:  Lung cancer screening, >= 30 pk-yr current smoker (Age 55-80y); Encounter for screening for malignant neoplasm of respiratory organs     TECHNIQUE:  CT of the thorax was performed with low dose, lung screening  protocol.  No contrast was administered.  Sagittal and coronal reconstructions were obtained.     COMPARISON:  01/05/2023     FINDINGS:  Lungs: Stable bilateral calcified and noncalcified pulmonary nodules.  Emphysema.     Pleura:   No effusion.     Heart and pericardium: Normal size without effusion.     Aorta and vasculature: Atherosclerosis including coronary arteries.     Chest wall and skeletal structures: Unremarkable except age-appropriate degenerative changes.     Upper abdomen: Unremarkable.     Impression:     Lung-RADS Category:  2 - Benign Appearance or Behavior - continue annual screening with LDCT in 12 months.     Clinically or potentially clinically significant non lung cancer finding:  None.     Prior Lung Cancer Modifier:  No history of prior lung cancer.        Electronically signed by:Toi Anderson MD  Date:                                            07/10/2023    Assessment:     1. Asthma with COPD    2. Mixed simple and mucopurulent chronic bronchitis    3. Adult bronchiectasis    4. On home oxygen therapy    5. SOB (shortness of breath) on exertion    6. Cigarette nicotine dependence without complication    7. Essential hypertension    8. Calcified granuloma of lung    9. Eosinophilic asthma      Orders Placed This Encounter   Procedures    CT Chest Lung Screening Low Dose     Standing Status:   Future     Standing Expiration Date:   5/3/2025     Order Specific Question:   Is there documentation of shared decision making for this lung screening exam?     Answer:   Yes     Order Specific Question:   Is the patient a current smoker?     Answer:   No     Order Specific Question:   How many years has the patient quit smoking?     Answer:   1     Order Specific Question:   Does the patient have a 20-pack/year or greater smoke history?     Answer:   Yes     Order Specific Question:   Is the patient between the ages 50-80 years old?     Answer:   Yes     Order Specific Question:   Does the patient show  any signs or symptoms of lung cancer?     Answer:   No     Order Specific Question:   Is this the first (baseline) CT or an annual exam?     Answer:   Annual [2]     Order Specific Question:   May the Radiologist modify the order per protocol to meet the clinical needs of the patient?     Answer:   Yes     Order Specific Question:   Is this a low dose screening chest CT?     Answer:   Yes     Order Specific Question:   Does the patient wear a continuous glucose monitor?     Answer:   No    Ambulatory referral/consult to Pulmonary Disease Management w/ Respiratory Therapist     Standing Status:   Future     Standing Expiration Date:   6/3/2025     Referral Priority:   Routine     Referral Type:   Consultation     Referral Reason:   Specialty Services Required     Requested Specialty:   Pulmonary Disease     Number of Visits Requested:   1    ACAPELLA TREATMENT     Airway clearance device with instruction [facility]  Accapella or Aerobika device     Standing Status:   Future     Standing Expiration Date:   7/2/2025    Stress test, pulmonary     Standing Status:   Future     Standing Expiration Date:   5/3/2025     Order Specific Question:   Reason for study     Answer:   Functional status     Order Specific Question:   Release to patient     Answer:   Immediate     Plan:     Problem List Items Addressed This Visit       Adult bronchiectasis (Chronic)     The current medical regimen is over all effective;  continue present plan and medications.Trelegy 200 mcg, Albuterol inhaler, Albuterol nebs. Duo nebs.   Add Accapella or Aerobika flutter device for mucous in morning and after noon, obtained 5/3/2024 with instruction with PDM. Continued follow up with pul dis mgmt. Begin regular exercise, walking or gym 4-5 days a week.               Relevant Orders    ACAPELLA TREATMENT    Ambulatory referral/consult to Pulmonary Disease Management w/ Respiratory Therapist     On home oxygen therapy    Relevant Orders    Stress test, pulmonary    Mixed simple and mucopurulent chronic bronchitis     The current medical regimen is over all effective;  continue present plan and medications.Trelegy 200 mcg, Albuterol inhaler, Albuterol nebs. Duo nebs.   Add Accapella or Aerobika flutter device for mucous in morning and after noon, obtained 5/3/2024 with instruction with PDM. Continued follow up with pul dis mgmt. Begin regular exercise, walking or gym 4-5 days a week.          Relevant Medications    albuterol (PROVENTIL) 2.5 mg /3 mL (0.083 %) nebulizer solution    Other Relevant Orders    Ambulatory referral/consult to Pulmonary Disease Management w/ Respiratory Therapist    Essential hypertension     Stable and controlled. Continue current treatment plan as previously prescribed with your PCP.            Eosinophilic asthma     The current medical regimen is over all effective;  continue present plan and medications.Trelegy 200 mcg, Albuterol inhaler, Albuterol nebs. Duo nebs. Dupixent pen.            Relevant Medications    albuterol (PROVENTIL) 2.5 mg /3 mL (0.083 %) nebulizer solution    Cigarette nicotine dependence without complication     48 pack year former smoker. Quit January 2023.   7/10/2023 LDCT Benign Appearance or Behavior - continue annual screening with LDCT in 12 months, July 2024.          Relevant Orders    CT Chest Lung Screening Low Dose    Calcified granuloma of lung     Seen on imaging xray and CT. 5/3/2024 chest xray There is a lateral right lower lobe calcified granuloma.  Benign finding, no additional follow up needed on calcified granuloma of lung            Other Visit Diagnoses       Asthma with COPD    -  Primary    Relevant Medications    albuterol (PROVENTIL) 2.5 mg /3 mL (0.083 %) nebulizer solution    Other Relevant Orders    Ambulatory referral/consult to Pulmonary Disease Management w/ Respiratory Therapist    SOB (shortness of breath) on exertion         Relevant Orders    Stress test, pulmonary          I spent a total of 42 minutes on the day of the visit.  This includes face to face time and non-face to face time preparing to see the patient (eg, review of tests), obtaining and/or reviewing separately obtained history, documenting clinical information in the electronic or other health record, independently interpreting results and communicating results to the patient/family/caregiver, or care coordinator.    Follow up in about 2 months (around 7/15/2024) for Asthma/COPD/SOB 6MWD/LDCT .      [facility] [place]

## 2024-05-03 NOTE — PROGRESS NOTES
Pulmonary Disease Management  [facility]  Final Follow up Visit  Chronic Care Management    Kenya Majano  was seen 5/3/2024  9:30 AM in the Pulmonary Disease Management Clinic for evaluation, education, reinforcement of self management techniques and exacerbation action plan.    Michael Blair    Past Medical History:   Diagnosis Date    Adult bronchiectasis 7/6/2023    Anxiety     Arthritis     Asthma     Cataract     Coronary artery disease     Emphysema of lung     Glaucoma     Hypertension     Seizures     Stroke        Patient's Medications   New Prescriptions    No medications on file   Previous Medications    ALBUTEROL (PROVENTIL) 2.5 MG /3 ML (0.083 %) NEBULIZER SOLUTION    Take 3 mLs (2.5 mg total) by nebulization every 4 (four) hours as needed for Wheezing or Shortness of Breath (cough). Rescue    ALBUTEROL (PROVENTIL/VENTOLIN HFA) 90 MCG/ACTUATION INHALER    Inhale 2 puffs into the lungs every 4 (four) hours as needed for Wheezing.    ALBUTEROL-IPRATROPIUM (DUO-NEB) 2.5 MG-0.5 MG/3 ML NEBULIZER SOLUTION    Take 3 mLs by nebulization every 6 (six) hours as needed for Wheezing or Shortness of Breath. Rescue    BIMATOPROST (LUMIGAN) 0.03 % OPHTHALMIC DROPS    Apply 1 drop to eye.    BRINZOLAMIDE-BRIMONIDINE (SIMBRINZA) 1-0.2 % DRPS    Apply 1 drop to eye.    CARBAMAZEPINE (TEGRETOL) 200 MG TABLET    Take 200 mg by mouth 2 (two) times daily.    CLONAZEPAM (KLONOPIN) 1 MG TABLET    Take 1 tablet (1 mg total) by mouth 2 (two) times daily.    CLOPIDOGREL (PLAVIX) 75 MG TABLET    Take 1 tablet (75 mg total) by mouth once daily.    DUPILUMAB (DUPIXENT PEN) 300 MG/2 ML PNIJ    Inject 2 mLs (300 mg total) into the skin every 14 (fourteen) days.    EZETIMIBE (ZETIA) 10 MG TABLET    Take 1 tablet (10 mg total) by mouth once daily.    FAMOTIDINE (PEPCID) 20 MG TABLET    Take 1 tablet (20 mg total) by mouth 2 (two) times daily.    FLUTICASONE PROPIONATE (FLONASE) 50 MCG/ACTUATION NASAL SPRAY    2  sprays by Nasal route daily as needed.    FLUTICASONE-UMECLIDIN-VILANTER (TRELEGY ELLIPTA) 200-62.5-25 MCG INHALER    Inhale 1 puff into the lungs once daily.    LINZESS 290 MCG CAP CAPSULE    Take 1 capsule (290 mcg total) by mouth before breakfast.    LISINOPRIL-HYDROCHLOROTHIAZIDE (PRINZIDE,ZESTORETIC) 20-25 MG TAB    Take 1 tablet by mouth once daily.    MONTELUKAST (SINGULAIR) 10 MG TABLET    Take 1 tablet (10 mg total) by mouth every evening.    PREDNISOLONE ACETATE (PRED FORTE) 1 % DRPS    Place 1 drop into the right eye.    PREDNISONE (DELTASONE) 20 MG TABLET    Prednisone 60 mg/ day for 3 days, 40 mg/day for 3 days,20 mg/ day for 3 days, (1/2 tablet )10 mg a day for 3 days.    PROMETHAZINE-DEXTROMETHORPHAN (PROMETHAZINE-DM) 6.25-15 MG/5 ML SYRP    Take 5 mLs by mouth nightly as needed (Cough).    ROSUVASTATIN (CRESTOR) 40 MG TAB    Take 1 tablet (40 mg total) by mouth once daily.    SERTRALINE (ZOLOFT) 100 MG TABLET    Take 1 tablet (100 mg total) by mouth once daily.    SODIUM CHLORIDE FOR INHALATION (SODIUM CHLORIDE 0.9%) 0.9 % NEBULIZER SOLUTION    Take 3 mLs by nebulization as needed for Wheezing (chest congestion).    VERAPAMIL (CALAN-SR) 120 MG CR TABLET    Take 120 mg by mouth.   Modified Medications    No medications on file   Discontinued Medications    No medications on file             Educational assessment:   [x]            Good  []            Fair  []            Poor    Readiness to learn:   [x]            Good  []            Fair  []            Poor    Vision Status:   [x]            Good  []            Fair  []            Poor    Reading Ability:  [x]            Good  []            Fair  []            Poor    Knowledge of condition:   [x]            Good  []            Fair  []            Poor    Language Barriers:   []            Good  []            Fair  []            Poor  [x]            None    Cognitive/ Physical Barriers:   []            Good  []            Fair  []             Poor  [x]            None    Learning best by:                       [x]            Seeing  []            Hearing  []            Reading                         [x]            Doing    Describe any barrier /Limitation or financial implications of care choices identified     []            Financial  []            Emotional  []            Education  []            Vision/Hearing  []            Physical  [x]            None  []                TOPIC /CONTENT FOR TODAY:    [x]            MDI with or without spacer  [x]            Dry powder inhaler  [x]            Aerobika   []           Peak Flow meter  [x]            COPD action plan  [x]            Nebulizer use  [x]            Oxygen use safety  [x]            Breathing and cough techniques  [x]            Energy conservation  [x]            Infection prevention  []           OTHER________________________        Learner:    [x]            Patient   []            Caregiver    Method:    [x]            Verbal explanation  [x]            Audio visual    [x]            Literature  [x]            Teach back      Evaluation:    [x]            Teach back  [x]            Demonstrate  [x]            Follow up phone call    []            2 weeks     []            4 weeks   [x]            PRN    Additional comments:   Patient was seen today to review respiratory medication purpose and proper technique for use of inhalers. Patient practiced proper technique using MDI with valved holding chamber (spacer) and DPI inhalers. Patient demonstrated understanding. Literature was given to patient.    Provided patient with Aerobika PEP therapy device. Instructed patient on proper use of the device. Educated patient on cleaning technique. Understanding was stated.      Asthma trigger checklist was verbally reviewed and literature given to patient.     Infection prevention was discussed. Patient's immunizations are current. Hand hygiene and cleaning of respiratory equipment was also  discussed. Patient verbalized understanding.      Action plan was reviewed and literature was given to patient. Patient verbalized understanding.     Plan:  Monthly Pulmonary Disease Management Questionnaire  Follow-up PDM appointment scheduled as needed   Reinforce education  Meds: Trelegy, albuterol, NaCl   DME Needs: [facility]   Action Plan: Yes   Immunization: Pneumococcal- current, Flu-current , Covid 19- current   Next Provider Visit: 7/2024  Next Spirometry/CPFT: 7/2024  Approximate time spent with patient: 30 mins

## 2024-05-03 NOTE — ASSESSMENT & PLAN NOTE
Seen on imaging xray and CT. 5/3/2024 chest xray There is a lateral right lower lobe calcified granuloma.  Benign finding, no additional follow up needed on calcified granuloma of lung

## 2024-05-03 NOTE — ASSESSMENT & PLAN NOTE
The current medical regimen is over all effective;  continue present plan and medications.Trelegy 200 mcg, Albuterol inhaler, Albuterol nebs. Duo nebs. Dupixent pen.

## 2024-05-03 NOTE — ASSESSMENT & PLAN NOTE
48 pack year former smoker. Quit January 2023.   7/10/2023 LDCT Benign Appearance or Behavior - continue annual screening with LDCT in 12 months, July 2024.

## 2024-05-15 NOTE — PROGRESS NOTES
"Subjective:       Patient ID: Kenya Majano is a 63 y.o. female.    Chief Complaint:  Follow-up  FOLLOW UP ON ASTHMA- COPD, RECURRENT SINO- BRONCHITIS, EOSINOPHILIC ASTHMA, PERIPHERAL EOSINOPHILIA.  CHRONIC COUGH MALAISE AND FATIGUE AND OBESITY    HPI:     female with the above complaints-- 63 year old, accompanied by her . Last seen by me on M Arch 06, 2024.  At that time, I  Started her on Dupixent.  After reviewing the results of extensive work up, I immunized her with PPSV- 23-- Pre - vaccination, had non - protective antibodies to 18/ 23 sero types.   Kenya was  re - immunized with PPSV- 23 ON march 06- 2024-- Post vaccination response is pending.  She ia an ex cigarette smoker. Astma- COPD overlap are treated appropriately--Has T2 ASTHMA PER FeNO  96 ppb - on 1- 02- 2023--   She has adult bronchiectasis.  Refer to " IMPRESSION AND MANAGEMENT PLAN ".  HAS CHRONIC  MALAISE AND FATIGUE. Eczema is well controlled.          Outpatient Medications Marked as Taking for the 5/16/24 encounter (Office Visit) with Praneeth Jackson MD   Medication Sig Dispense Refill    albuterol (PROVENTIL) 2.5 mg /3 mL (0.083 %) nebulizer solution Take 3 mLs (2.5 mg total) by nebulization every 4 (four) hours as needed for Wheezing or Shortness of Breath (cough). Rescue 360 mL 11    albuterol (PROVENTIL/VENTOLIN HFA) 90 mcg/actuation inhaler Inhale 2 puffs into the lungs every 4 (four) hours as needed for Wheezing. 8.5 g 2    albuterol-ipratropium (DUO-NEB) 2.5 mg-0.5 mg/3 mL nebulizer solution Take 3 mLs by nebulization every 6 (six) hours as needed for Wheezing or Shortness of Breath. Rescue 300 mL 11    brinzolamide-brimonidine (SIMBRINZA) 1-0.2 % DrpS Apply 1 drop to eye.      carBAMazepine (TEGRETOL) 200 mg tablet Take 200 mg by mouth 2 (two) times daily.      clonazePAM (KLONOPIN) 1 MG tablet Take 1 tablet (1 mg total) by mouth 2 (two) times daily. 60 tablet 5    clopidogreL (PLAVIX) 75 mg tablet Take 1 " tablet (75 mg total) by mouth once daily. 90 tablet 0    dupilumab (DUPIXENT PEN) 300 mg/2 mL PnIj Inject 2 mLs (300 mg total) into the skin every 14 (fourteen) days. 4 mL 11    famotidine (PEPCID) 20 MG tablet Take 1 tablet (20 mg total) by mouth 2 (two) times daily. 180 tablet 0    fluticasone propionate (FLONASE) 50 mcg/actuation nasal spray 2 sprays by Nasal route daily as needed.      fluticasone-umeclidin-vilanter (TRELEGY ELLIPTA) 200-62.5-25 mcg inhaler Inhale 1 puff into the lungs once daily. 60 each 6    LINZESS 290 mcg Cap capsule Take 1 capsule (290 mcg total) by mouth before breakfast. 90 capsule 0    lisinopriL-hydrochlorothiazide (PRINZIDE,ZESTORETIC) 20-25 mg Tab Take 1 tablet by mouth once daily. 90 tablet 0    montelukast (SINGULAIR) 10 mg tablet Take 1 tablet (10 mg total) by mouth every evening. 90 tablet 3    prednisoLONE acetate (PRED FORTE) 1 % DrpS Place 1 drop into the right eye.      sertraline (ZOLOFT) 100 MG tablet Take 1 tablet (100 mg total) by mouth once daily. 90 tablet 0    sodium chloride for inhalation (SODIUM CHLORIDE 0.9%) 0.9 % nebulizer solution Take 3 mLs by nebulization as needed for Wheezing (chest congestion). 300 mL 11        Sulfa (sulfonamide antibiotics), Dimenhydrinate, Sumatriptan, and Latex     Past Medical History:   Diagnosis Date    Adult bronchiectasis 7/6/2023    Anxiety     Arthritis     Asthma     Cataract     Coronary artery disease     Emphysema of lung     Glaucoma     Hypertension     Seizures     Stroke        Family History   Problem Relation Name Age of Onset    Diabetes Mother      Stroke Mother      Heart disease Father      Diabetes Maternal Grandmother      No Known Problems Maternal Grandfather      Hearing loss Paternal Grandmother      Hearing loss Paternal Grandfather         Environmental History: Dust Mite Controls: Dust mite controls are already in place.     Review of Systems   Constitutional:  Positive for fatigue. Negative for fever.    HENT:  Positive for congestion, postnasal drip and rhinorrhea. Negative for ear pain, sinus pressure, sinus pain, sneezing and sore throat.    Eyes:  Positive for itching. Negative for redness.   Respiratory:  Positive for cough and chest tightness. Negative for shortness of breath and wheezing.    Cardiovascular: Negative.  Negative for chest pain.   Gastrointestinal: Negative.  Negative for nausea.   Endocrine: Negative.  Negative for cold intolerance.   Genitourinary: Negative.  Negative for frequency.   Musculoskeletal: Negative.  Negative for myalgias.   Skin: Negative.  Negative for rash.   Allergic/Immunologic: Negative.  Negative for environmental allergies, food allergies and immunocompromised state.   Neurological: Negative.  Negative for dizziness and headaches.   Hematological: Negative.  Negative for adenopathy.   Psychiatric/Behavioral: Negative.  Negative for sleep disturbance.      Objective:     Visit Vitals  /69 (BP Location: Right arm, Patient Position: Sitting)   Pulse 94   Temp 97.8 °F (36.6 °C)   Wt 68.9 kg (151 lb 14.4 oz)   LMP  (LMP Unknown)   BMI 29.67 kg/m²       Physical Exam  Vitals and nursing note reviewed. Exam conducted with a chaperone present.   Constitutional:       Appearance: Normal appearance. She is normal weight.   HENT:      Head: Normocephalic and atraumatic.      Right Ear: Tympanic membrane, ear canal and external ear normal.      Left Ear: Tympanic membrane, ear canal and external ear normal.      Nose: Congestion and rhinorrhea present.      Mouth/Throat:      Mouth: Mucous membranes are moist.      Pharynx: Oropharynx is clear.   Eyes:      Extraocular Movements: Extraocular movements intact.      Conjunctiva/sclera: Conjunctivae normal.      Pupils: Pupils are equal, round, and reactive to light.   Cardiovascular:      Rate and Rhythm: Normal rate and regular rhythm.      Pulses: Normal pulses.      Heart sounds: Normal heart sounds.   Pulmonary:      Effort:  Pulmonary effort is normal.      Breath sounds: Normal breath sounds.   Abdominal:      General: Abdomen is flat. Bowel sounds are normal.      Palpations: Abdomen is soft.   Musculoskeletal:         General: Normal range of motion.      Cervical back: Normal range of motion and neck supple.   Skin:     General: Skin is warm and dry.      Capillary Refill: Capillary refill takes less than 2 seconds.   Neurological:      General: No focal deficit present.      Mental Status: She is alert and oriented to person, place, and time. Mental status is at baseline.   Psychiatric:         Mood and Affect: Mood normal.         Behavior: Behavior normal.         Thought Content: Thought content normal.         Judgment: Judgment normal.       Assessment:      1. Eosinophilic asthma    2. Asthma-COPD overlap syndrome    3. Peripheral eosinophilia    4. Perennial non-allergic rhinitis    5. Cough in adult    6. Adult bronchiectasis    7. Ex-cigarette smoker    8. Eczema, unspecified type    9. Chronic fatigue and malaise    10. Obesity, unspecified classification, unspecified obesity type, unspecified whether serious comorbidity present    11. On home oxygen therapy        Plan:     Stay on Dupixent 300 mg q 14 days- her  administers Dupixent.-- helping tremendously.  Had had PPSV- 23- ON 03- 06- 2024-- Ordered vaccination response .   In spite of PPSV- 23 on 08- 30 2023 and Prevnar 13 on 06- 26- 2019, ---- She had non protective antibodies to 18 / 23 Sero types.    Trelegy 200/ 62.5/ 25/ 25-- one puff once daily  Proair HFA 90 mcg 2 puffs tid prn.  Duoneb one unit tid prn  ---------------------------------------------------------------  Pepcid 40 mg  --------------------------------------  Zyrtec 10 mg  Flonase 50 mcg ad or bid.    Consider Azithromycin prophylaxis.  Follow up in 3- 4 months  -----------------------------------------------  On OXYGEN AT NIGHTS AND DURING ACTIVITY- 2 LITERS / MIN.  Follow up with Rick  [facility]ULL[facility]- [facility]                  Problems Address                                                 Amount and/or Complexity                                                                      Risk       3           [] 2 or more self-limited or minor problems                      [] Limited                                                                        [] Low                  [] 1 stable chronic illness                                                  Any combination of the two                                               OTC drugs                  []Acute, uncomplicated illness or injury                            Review of prior external notes from unique source           Minor surgery with no risk factors                                                                                                               [] 1 []2  []3+                                                                                                              Review of results from each unique test                                                                                                               [] 1 []2  [] 3+                                                                                                              Order of each unique test                                                                                                               [] 1 []2  [] 3+                                                                                                              Or                                                                                                             [] Assessment requiring an independent historian      4            [] One or more chronic illness with exacerbation,              [] Moderate                                                                      [] Moderate                 Progression, or side effects of treatment                            -test documents or independent  historians                        Prescription drug management                []  2 or more sable chronic illnesses                                    [] Independent interpretation of tests                              Minor surgery with identifiable risk                [] 1 undiagnosed new problem with uncertain prognosis    [] Discussion or management of test results                    elective major surgery                [] 1 acute illness with                systemic symptoms                                                                                                                                                              [] 1 acute complicated injury                                                                                                                                          Elective major surgery                                                                                                                                                                                                                                                                                                                                                                                                  5            [] 1 or more chronic illnesses with severe exacerbation,     [] Extensive(two from below)                                         [] High                                                                                                               [] Independent interpretation of results                         Drug therapy requiring intensive                                                                                                               []Discussion of management or test interpretation           monitoring                                                                                                                                                                                                        Decision to de-escalate care                 [] 1 acute or chronic illness or injury that poses a threat                                                                                               Decision regarding hospitalization

## 2024-05-16 ENCOUNTER — LAB VISIT (OUTPATIENT)
Dept: LAB | Facility: HOSPITAL | Age: 64
End: 2024-05-16
Attending: SPECIALIST
Payer: MEDICARE

## 2024-05-16 ENCOUNTER — OFFICE VISIT (OUTPATIENT)
Dept: ALLERGY | Facility: CLINIC | Age: 64
End: 2024-05-16
Payer: MEDICARE

## 2024-05-16 VITALS
BODY MASS INDEX: 29.67 KG/M2 | HEART RATE: 94 BPM | WEIGHT: 151.88 LBS | SYSTOLIC BLOOD PRESSURE: 115 MMHG | DIASTOLIC BLOOD PRESSURE: 69 MMHG | TEMPERATURE: 98 F

## 2024-05-16 DIAGNOSIS — D72.19 PERIPHERAL EOSINOPHILIA: ICD-10-CM

## 2024-05-16 DIAGNOSIS — J82.83 EOSINOPHILIC ASTHMA: Primary | ICD-10-CM

## 2024-05-16 DIAGNOSIS — E66.9 OBESITY, UNSPECIFIED CLASSIFICATION, UNSPECIFIED OBESITY TYPE, UNSPECIFIED WHETHER SERIOUS COMORBIDITY PRESENT: ICD-10-CM

## 2024-05-16 DIAGNOSIS — L30.9 ECZEMA, UNSPECIFIED TYPE: ICD-10-CM

## 2024-05-16 DIAGNOSIS — J47.9 ADULT BRONCHIECTASIS: ICD-10-CM

## 2024-05-16 DIAGNOSIS — Z87.891 EX-CIGARETTE SMOKER: ICD-10-CM

## 2024-05-16 DIAGNOSIS — R05.9 COUGH IN ADULT: ICD-10-CM

## 2024-05-16 DIAGNOSIS — R53.81 CHRONIC FATIGUE AND MALAISE: ICD-10-CM

## 2024-05-16 DIAGNOSIS — J40 BRONCHITIS: ICD-10-CM

## 2024-05-16 DIAGNOSIS — J31.0 PERENNIAL NON-ALLERGIC RHINITIS: ICD-10-CM

## 2024-05-16 DIAGNOSIS — J82.83 EOSINOPHILIC ASTHMA: ICD-10-CM

## 2024-05-16 DIAGNOSIS — J44.89 ASTHMA-COPD OVERLAP SYNDROME: ICD-10-CM

## 2024-05-16 DIAGNOSIS — R53.82 CHRONIC FATIGUE AND MALAISE: ICD-10-CM

## 2024-05-16 DIAGNOSIS — Z99.81 ON HOME OXYGEN THERAPY: ICD-10-CM

## 2024-05-16 LAB — CRP SERPL-MCNC: 11.1 MG/L (ref 0–8.2)

## 2024-05-16 PROCEDURE — 1159F MED LIST DOCD IN RCRD: CPT | Mod: CPTII,S$GLB,, | Performed by: SPECIALIST

## 2024-05-16 PROCEDURE — 86317 IMMUNOASSAY INFECTIOUS AGENT: CPT | Mod: 59 | Performed by: SPECIALIST

## 2024-05-16 PROCEDURE — 99215 OFFICE O/P EST HI 40 MIN: CPT | Mod: S$GLB,,, | Performed by: SPECIALIST

## 2024-05-16 PROCEDURE — 86140 C-REACTIVE PROTEIN: CPT | Performed by: SPECIALIST

## 2024-05-16 PROCEDURE — 36415 COLL VENOUS BLD VENIPUNCTURE: CPT | Performed by: SPECIALIST

## 2024-05-16 PROCEDURE — 99999 PR PBB SHADOW E&M-EST. PATIENT-LVL V: CPT | Mod: PBBFAC,,, | Performed by: SPECIALIST

## 2024-05-16 PROCEDURE — 3008F BODY MASS INDEX DOCD: CPT | Mod: CPTII,S$GLB,, | Performed by: SPECIALIST

## 2024-05-16 PROCEDURE — 1160F RVW MEDS BY RX/DR IN RCRD: CPT | Mod: CPTII,S$GLB,, | Performed by: SPECIALIST

## 2024-05-16 PROCEDURE — 3074F SYST BP LT 130 MM HG: CPT | Mod: CPTII,S$GLB,, | Performed by: SPECIALIST

## 2024-05-16 PROCEDURE — 4010F ACE/ARB THERAPY RXD/TAKEN: CPT | Mod: CPTII,S$GLB,, | Performed by: SPECIALIST

## 2024-05-16 PROCEDURE — 3078F DIAST BP <80 MM HG: CPT | Mod: CPTII,S$GLB,, | Performed by: SPECIALIST

## 2024-05-19 DIAGNOSIS — G47.09 OTHER INSOMNIA: ICD-10-CM

## 2024-05-19 DIAGNOSIS — F41.1 GAD (GENERALIZED ANXIETY DISORDER): ICD-10-CM

## 2024-05-21 RX ORDER — CLONAZEPAM 1 MG/1
1 TABLET ORAL 2 TIMES DAILY
Qty: 60 TABLET | Refills: 5 | Status: SHIPPED | OUTPATIENT
Start: 2024-05-21 | End: 2025-05-21

## 2024-05-22 LAB
IMMUNOLOGIST REVIEW: NORMAL
S PN DA SERO 19F IGG SER-MCNC: >100 MCG/ML
S PNEUM DA 1 IGG SER-MCNC: NORMAL MCG/ML
S PNEUM DA 10A IGG SER-MCNC: 9.8 MCG/ML
S PNEUM DA 11A IGG SER-MCNC: 2.3 MCG/ML
S PNEUM DA 12F IGG SER-MCNC: 2 MCG/ML
S PNEUM DA 14 IGG SER-MCNC: 19 MCG/ML
S PNEUM DA 15B IGG SER-MCNC: 6.6 MCG/ML
S PNEUM DA 17F IGG SER-MCNC: 1.1 MCG/ML
S PNEUM DA 18C IGG SER-MCNC: 2.8 MCG/ML
S PNEUM DA 19A IGG SER-MCNC: NORMAL MCG/ML
S PNEUM DA 2 IGG SER-MCNC: 0.8 MCG/ML
S PNEUM DA 20A IGG SER-MCNC: 34.9 MCG/ML
S PNEUM DA 22F IGG SER-MCNC: 1.4 MCG/ML
S PNEUM DA 23F IGG SER-MCNC: 11.6 MCG/ML
S PNEUM DA 3 IGG SER-MCNC: 0.6 MCG/ML
S PNEUM DA 33F IGG SER-MCNC: 63.9 MCG/ML
S PNEUM DA 4 IGG SER-MCNC: 0.5 MCG/ML
S PNEUM DA 5 IGG SER-MCNC: 1.2 MCG/ML
S PNEUM DA 6B IGG SER-MCNC: 13 MCG/ML
S PNEUM DA 7F IGG SER-MCNC: 3.9 MCG/ML
S PNEUM DA 8 IGG SER-MCNC: 3.6 MCG/ML
S PNEUM DA 9N IGG SER-MCNC: 20.1 MCG/ML
S PNEUM DA 9V IGG SER-MCNC: 11.4 MCG/ML

## 2024-05-29 ENCOUNTER — PATIENT MESSAGE (OUTPATIENT)
Dept: ADMINISTRATIVE | Facility: OTHER | Age: 64
End: 2024-05-29
Payer: MEDICARE

## 2024-06-20 ENCOUNTER — TELEPHONE (OUTPATIENT)
Dept: PULMONOLOGY | Facility: CLINIC | Age: 64
End: 2024-06-20
Payer: MEDICARE

## 2024-06-20 DIAGNOSIS — J44.89 ASTHMA-COPD OVERLAP SYNDROME: Primary | ICD-10-CM

## 2024-06-25 ENCOUNTER — TELEPHONE (OUTPATIENT)
Dept: PULMONOLOGY | Facility: CLINIC | Age: 64
End: 2024-06-25
Payer: MEDICARE

## 2024-06-26 ENCOUNTER — PATIENT MESSAGE (OUTPATIENT)
Dept: ADMINISTRATIVE | Facility: OTHER | Age: 64
End: 2024-06-26
Payer: MEDICARE

## 2024-07-09 DIAGNOSIS — J41.8 MIXED SIMPLE AND MUCOPURULENT CHRONIC BRONCHITIS: ICD-10-CM

## 2024-07-10 RX ORDER — FLUTICASONE FUROATE, UMECLIDINIUM BROMIDE AND VILANTEROL TRIFENATATE 200; 62.5; 25 UG/1; UG/1; UG/1
1 POWDER RESPIRATORY (INHALATION) DAILY
Qty: 60 EACH | Refills: 0 | Status: SHIPPED | OUTPATIENT
Start: 2024-07-10

## 2024-07-23 ENCOUNTER — TELEPHONE (OUTPATIENT)
Dept: PULMONOLOGY | Facility: CLINIC | Age: 64
End: 2024-07-23
Payer: MEDICARE

## 2024-07-23 NOTE — TELEPHONE ENCOUNTER
----- Message from Christa Katz sent at 7/23/2024  8:48 AM CDT -----  Contact: Kenya Zambrano is calling to speak to the nurse regarding her scheduled appointment on 08/01, she would like to reschedule for 09/03, I tried to reschedule I don't have anything, please give her a call at      Thanks  LJ

## 2024-07-26 ENCOUNTER — PATIENT MESSAGE (OUTPATIENT)
Dept: ADMINISTRATIVE | Facility: OTHER | Age: 64
End: 2024-07-26
Payer: MEDICARE

## 2024-08-05 DIAGNOSIS — J41.8 MIXED SIMPLE AND MUCOPURULENT CHRONIC BRONCHITIS: ICD-10-CM

## 2024-08-05 RX ORDER — FLUTICASONE FUROATE, UMECLIDINIUM BROMIDE AND VILANTEROL TRIFENATATE 200; 62.5; 25 UG/1; UG/1; UG/1
1 POWDER RESPIRATORY (INHALATION) DAILY
Qty: 60 EACH | Refills: 0 | Status: SHIPPED | OUTPATIENT
Start: 2024-08-05

## 2024-08-13 NOTE — PROGRESS NOTES
"Subjective:       Patient ID: Kenya Majano is a 64 y.o. female.    Chief Complaint:  FOLLOW UP ON ASTHMA- COPD, EOSINOPHILIC ASTHMA, PERIPHERAL EOSINOPHILIA, ECZEMA, PERENNIAL NON ALLERGIC RHINITIS AND MALAISE AND FATIGUE    HPI:    ACCOMPANIED BY HER . DOING ok ON dUPIXENT.     female with the above complaints- for follow up. She had had persistent bronchial asthma symptoms, peripheral eosinophilia and Eosinophilic asthma and Asthma - COIPD overlap..    She is doing great on Dupixent 300 mg q 14 days- administered by her . Her exercise tolerance is poor anfd will be on 2 L / min Oxygen during activity and at nights.    She also has Specific antibody deficiency in that she  had no protective antibodies to 18 / 23 david types after PPSV- 23 and PCV- 13. \  However after booster  PCV- 20 on 05- 16- 2024- , post vaccine response was non protective only 5 / 23 sero types..  HAM HAS NON- ALLERGIC RHINITIS- TREATED SYMPTOMATICALLY.  She is an ex- cigarette smoker.  Eczema is well controlled.  She has been snoring and mouth breathing. " Sleeps all over the bed ". In the past she was told that she has "Restless Legs Syndrome "- Requip was prescribed.   She had to discontinue it because " she was getting up at nights and eating without realizing it ".  She is chronically fatigued. Referred her back to Dr Rick Sanchez for Sleep Consult and potentially doing Polysomnogram and also treatment of RLS       Sulfa (sulfonamide antibiotics), Dimenhydrinate, Sumatriptan, and Latex -- ALLERGIES WERE REPORTED    Past Medical History:   Diagnosis Date    Adult bronchiectasis 7/6/2023    Anxiety     Arthritis     Asthma     Cataract     Coronary artery disease     Emphysema of lung     Glaucoma     Hypertension     Seizures     Stroke        Family History   Problem Relation Name Age of Onset    Diabetes Mother      Stroke Mother      Heart disease Father      Diabetes Maternal Grandmother      No Known " Problems Maternal Grandfather      Hearing loss Paternal Grandmother      Hearing loss Paternal Grandfather         Environmental History: Dust Mite Controls: Dust mite controls are already in place.     Review of Systems   Constitutional:  Positive for fatigue. Negative for fever.   HENT:  Positive for congestion and postnasal drip. Negative for ear pain, rhinorrhea, sinus pressure, sinus pain, sneezing and sore throat.    Eyes: Negative.  Negative for redness and itching.   Respiratory:  Positive for cough, chest tightness and shortness of breath. Negative for wheezing.    Cardiovascular: Negative.  Negative for chest pain.   Gastrointestinal: Negative.  Negative for nausea.   Endocrine: Negative.  Negative for cold intolerance.   Genitourinary: Negative.  Negative for frequency.   Musculoskeletal: Negative.  Negative for myalgias.   Skin: Negative.  Negative for rash.   Allergic/Immunologic: Negative.  Negative for environmental allergies, food allergies and immunocompromised state.   Neurological: Negative.  Negative for dizziness and headaches.   Hematological: Negative.  Negative for adenopathy.   Psychiatric/Behavioral: Negative.  Negative for sleep disturbance.      Objective:     Visit Vitals  LMP  (LMP Unknown)       Physical Exam  Vitals and nursing note reviewed. Exam conducted with a chaperone present.   Constitutional:       Appearance: Normal appearance. She is obese.   HENT:      Head: Normocephalic and atraumatic.      Right Ear: Tympanic membrane, ear canal and external ear normal.      Left Ear: Tympanic membrane, ear canal and external ear normal.      Nose: Congestion present. No rhinorrhea.      Mouth/Throat:      Mouth: Mucous membranes are moist.      Pharynx: Oropharynx is clear.   Eyes:      Extraocular Movements: Extraocular movements intact.      Conjunctiva/sclera: Conjunctivae normal.      Pupils: Pupils are equal, round, and reactive to light.   Cardiovascular:      Rate and Rhythm:  Normal rate and regular rhythm.      Pulses: Normal pulses.      Heart sounds: Normal heart sounds.   Pulmonary:      Effort: Pulmonary effort is normal.      Breath sounds: Normal breath sounds.   Abdominal:      General: Abdomen is flat. Bowel sounds are normal.      Palpations: Abdomen is soft.   Musculoskeletal:         General: Normal range of motion.      Cervical back: Normal range of motion and neck supple.   Skin:     General: Skin is warm and dry.      Capillary Refill: Capillary refill takes less than 2 seconds.   Neurological:      Mental Status: She is alert and oriented to person, place, and time. Mental status is at baseline.   Psychiatric:         Mood and Affect: Mood normal.         Behavior: Behavior normal.         Thought Content: Thought content normal.         Judgment: Judgment normal.       Assessment:      1. Eosinophilic asthma    2. Asthma-COPD overlap syndrome    3. Peripheral eosinophilia    4. Perennial non-allergic rhinitis    5. Chronic fatigue and malaise    6. Obesity, unspecified classification, unspecified obesity type, unspecified whether serious comorbidity present    7. Adult bronchiectasis    8. Ex-cigarette smoker    9. Cough in adult    10. On home oxygen therapy    11      HISTORY OF RLS- RESTLESS SLEEPER- Snoring and mouth breathing- Has daytime somnolence            EPWORTH SCORE WAS 5    Plan:     On home Oxygen therapy ant 2  L/ minute during exertion.    Dupixent 400 mg q 13=4 days- self administered- by her . Having pain at the site of injection  -------------------------------------------------------------------------------------------------  Trelegy 200/ 62.5 / 25-- one puff daily,.  Proair HFA 90 MCG 2 PUFFS TID PRN.  DUONEB ONE UNIT TID PRN.  ---------------------------------------------------------------------------  IN THE PAST AFTER PPSV- 23 and PCV- 13- had non protective antibodies to 18 / 23 sero types.  Was immunized with PCV- 20-- POST TITERS ---  NON PROTECTIVE 5 / 23 SERO TYPES,   NORMAL CMP AND CRP 11.1 -- ON 05- 16- 2024.  ----------------------------------------------------------------------------------------------------  Pepcd 40 mg.  Zyrtec 10 mg  Flonase 50 mcg qd or bid.  Follow up with Rick Sanchez MD- Pulmonology service.  Follow up in 3- 4 months                  Problems Address                                                 Amount and/or Complexity                                                                      Risk       3           [] 2 or more self-limited or minor problems                      [] Limited                                                                        [] Low                  [] 1 stable chronic illness                                                  Any combination of the two                                               OTC drugs                  []Acute, uncomplicated illness or injury                            Review of prior external notes from unique source           Minor surgery with no risk factors                                                                                                               [] 1 []2  []3+                                                                                                              Review of results from each unique test                                                                                                               [] 1 []2  [] 3+                                                                                                              Order of each unique test                                                                                                               [] 1 []2  [] 3+                                                                                                              Or                                                                                                             [] Assessment requiring an independent  historian      4            [] One or more chronic illness with exacerbation,              [] Moderate                                                                      [] Moderate                 Progression, or side effects of treatment                            -test documents or independent historians                        Prescription drug management                []  2 or more sable chronic illnesses                                    [] Independent interpretation of tests                              Minor surgery with identifiable risk                [] 1 undiagnosed new problem with uncertain prognosis    [] Discussion or management of test results                    elective major surgery                [] 1 acute illness with                systemic symptoms                                                                                                                                                              [] 1 acute complicated injury                                                                                                                                          Elective major surgery                                                                                                                                                                                                                                                                                                                                                                                                  5            [x] 1 or more chronic illnesses with severe exacerbation,     [x] Extensive(two from below)                                         [x] High                                                                                                               [] Independent interpretation of results                         Drug therapy requiring intensive                                                                                                                []Discussion of management or test interpretation           monitoring                                                                                                                                                                                                       Decision to de-escalate care                 [] 1 acute or chronic illness or injury that poses a threat                                                                                               Decision regarding hospitalization

## 2024-08-14 ENCOUNTER — OFFICE VISIT (OUTPATIENT)
Dept: ALLERGY | Facility: CLINIC | Age: 64
End: 2024-08-14
Payer: MEDICARE

## 2024-08-14 VITALS
SYSTOLIC BLOOD PRESSURE: 105 MMHG | HEIGHT: 60 IN | TEMPERATURE: 98 F | DIASTOLIC BLOOD PRESSURE: 65 MMHG | HEART RATE: 76 BPM | BODY MASS INDEX: 32.08 KG/M2 | WEIGHT: 163.38 LBS

## 2024-08-14 DIAGNOSIS — Z99.81 ON HOME OXYGEN THERAPY: ICD-10-CM

## 2024-08-14 DIAGNOSIS — R53.83 MALAISE AND FATIGUE: ICD-10-CM

## 2024-08-14 DIAGNOSIS — R53.82 CHRONIC FATIGUE AND MALAISE: ICD-10-CM

## 2024-08-14 DIAGNOSIS — J47.9 ADULT BRONCHIECTASIS: ICD-10-CM

## 2024-08-14 DIAGNOSIS — R53.81 MALAISE AND FATIGUE: ICD-10-CM

## 2024-08-14 DIAGNOSIS — J82.83 EOSINOPHILIC ASTHMA: Primary | ICD-10-CM

## 2024-08-14 DIAGNOSIS — R06.83 SNORING: ICD-10-CM

## 2024-08-14 DIAGNOSIS — E66.9 OBESITY, UNSPECIFIED CLASSIFICATION, UNSPECIFIED OBESITY TYPE, UNSPECIFIED WHETHER SERIOUS COMORBIDITY PRESENT: ICD-10-CM

## 2024-08-14 DIAGNOSIS — J44.89 ASTHMA-COPD OVERLAP SYNDROME: ICD-10-CM

## 2024-08-14 DIAGNOSIS — J31.0 PERENNIAL NON-ALLERGIC RHINITIS: ICD-10-CM

## 2024-08-14 DIAGNOSIS — R05.9 COUGH IN ADULT: ICD-10-CM

## 2024-08-14 DIAGNOSIS — D72.19 PERIPHERAL EOSINOPHILIA: ICD-10-CM

## 2024-08-14 DIAGNOSIS — R53.81 CHRONIC FATIGUE AND MALAISE: ICD-10-CM

## 2024-08-14 DIAGNOSIS — Z87.891 EX-CIGARETTE SMOKER: ICD-10-CM

## 2024-08-14 PROCEDURE — 99215 OFFICE O/P EST HI 40 MIN: CPT | Mod: S$GLB,,, | Performed by: SPECIALIST

## 2024-08-14 PROCEDURE — 99999 PR PBB SHADOW E&M-EST. PATIENT-LVL V: CPT | Mod: PBBFAC,,, | Performed by: SPECIALIST

## 2024-08-14 PROCEDURE — 3008F BODY MASS INDEX DOCD: CPT | Mod: CPTII,S$GLB,, | Performed by: SPECIALIST

## 2024-08-14 PROCEDURE — 3074F SYST BP LT 130 MM HG: CPT | Mod: CPTII,S$GLB,, | Performed by: SPECIALIST

## 2024-08-14 PROCEDURE — 4010F ACE/ARB THERAPY RXD/TAKEN: CPT | Mod: CPTII,S$GLB,, | Performed by: SPECIALIST

## 2024-08-14 PROCEDURE — 3078F DIAST BP <80 MM HG: CPT | Mod: CPTII,S$GLB,, | Performed by: SPECIALIST

## 2024-08-14 PROCEDURE — 3288F FALL RISK ASSESSMENT DOCD: CPT | Mod: CPTII,S$GLB,, | Performed by: SPECIALIST

## 2024-08-14 PROCEDURE — 1159F MED LIST DOCD IN RCRD: CPT | Mod: CPTII,S$GLB,, | Performed by: SPECIALIST

## 2024-08-14 PROCEDURE — 1160F RVW MEDS BY RX/DR IN RCRD: CPT | Mod: CPTII,S$GLB,, | Performed by: SPECIALIST

## 2024-08-14 PROCEDURE — 1101F PT FALLS ASSESS-DOCD LE1/YR: CPT | Mod: CPTII,S$GLB,, | Performed by: SPECIALIST

## 2024-09-03 ENCOUNTER — CLINICAL SUPPORT (OUTPATIENT)
Dept: PULMONOLOGY | Facility: CLINIC | Age: 64
End: 2024-09-03
Payer: MEDICARE

## 2024-09-03 ENCOUNTER — CLINICAL SUPPORT (OUTPATIENT)
Dept: PULMONOLOGY | Facility: CLINIC | Age: 64
End: 2024-09-03
Attending: NURSE PRACTITIONER
Payer: MEDICARE

## 2024-09-03 ENCOUNTER — HOSPITAL ENCOUNTER (OUTPATIENT)
Dept: RADIOLOGY | Facility: HOSPITAL | Age: 64
Discharge: HOME | End: 2024-09-03
Attending: NURSE PRACTITIONER
Payer: MEDICARE

## 2024-09-03 VITALS — WEIGHT: 164.69 LBS | BODY MASS INDEX: 32.33 KG/M2 | HEIGHT: 60 IN

## 2024-09-03 DIAGNOSIS — R06.02 SOB (SHORTNESS OF BREATH) ON EXERTION: ICD-10-CM

## 2024-09-03 DIAGNOSIS — Z99.81 ON HOME OXYGEN THERAPY: ICD-10-CM

## 2024-09-03 DIAGNOSIS — J44.89 ASTHMA-COPD OVERLAP SYNDROME: ICD-10-CM

## 2024-09-03 DIAGNOSIS — F17.210 CIGARETTE NICOTINE DEPENDENCE WITHOUT COMPLICATION: ICD-10-CM

## 2024-09-03 LAB
ALLENS TEST: ABNORMAL
DELSYS: ABNORMAL
FIO2: 21
HCO3 UR-SCNC: 28.8 MMOL/L (ref 24–28)
MODE: ABNORMAL
PCO2 BLDA: 43.9 MMHG (ref 35–45)
PH SMN: 7.42 [PH] (ref 7.35–7.45)
PO2 BLDA: 89 MMHG (ref 80–100)
POC BE: 4 MMOL/L
POC SATURATED O2: 97 % (ref 95–100)
SAMPLE: ABNORMAL
SITE: ABNORMAL

## 2024-09-03 PROCEDURE — 71271 CT THORAX LUNG CANCER SCR C-: CPT | Mod: 26,,, | Performed by: RADIOLOGY

## 2024-09-03 PROCEDURE — 99999 PR PBB SHADOW E&M-EST. PATIENT-LVL I: CPT | Mod: PBBFAC,,,

## 2024-09-03 PROCEDURE — 71271 CT THORAX LUNG CANCER SCR C-: CPT | Mod: TC

## 2024-09-03 NOTE — PROCEDURES
ABG completed. See ABG Below.    Component      Latest Ref Rng 9/3/2024   POC PH      7.35 - 7.45  7.424    POC PCO2      35 - 45 mmHg 43.9    POC PO2      80 - 100 mmHg 89    POC HCO3      24 - 28 mmol/L 28.8 (H)    POC BE      -2 to 2 mmol/L 4 (H)    POC SATURATED O2      95 - 100 % 97    Sample ARTERIAL    Site RR    Allens Test Pass    DelSys Room Air    Mode SPONT    FiO2 21       Legend:  (H) High      Interpretation:  [x] Arterial blood gases on room air demonstrate normal pCO2 and pO2  [] Arterial blood gases on room air are abnormal demonstrating hypercarbia (pCO2 >45 mmHg)  [] Arterial blood gases on room air are abnormal demonstrating hypocarbia (pCO2 < 35 mmHg)  [] Arterial blood gases on room air are abnormal demonstrating hypoxemia (pO2 < 80 mmHg)  [] Arterial blood gases on room air are abnormal demonstrating hyperoxemia (pO2 >120 mmHg)  [] Arterial blood gases on room air are abnormal demonstrating hypoxemia (pO2 < 80 mmHg) and hypercarbia (pCO2>45 mmHg)    The table below summarizes the main interpretations of the relationship between the arterial blood gases, pH, pCO2 and HCO-3    []    I

## 2024-09-03 NOTE — PROCEDURES
[facility] 3rdFl  Six Minute Walk     SUMMARY     Ordering Provider: LIS Topete   Interpreting Provider:   Performing nurse/tech/RT: CELESTE oMntes RRT  Diagnosis:  (Shortness of Breath on Exertion)  Height: 5' (152.4 cm)  Weight: 74.7 kg (164 lb 10.9 oz)  BMI (Calculated): 32.2                Phase Oxygen Assessment Supplemental O2 Heart   Rate Blood Pressure Mookie Dyspnea Scale Rating   Resting 100 % Room Air 68 bpm 94/46 0   Exercise        Minute        1 98 % Room Air 85 bpm     2 96 % Room Air 96 bpm     3 96 % Room Air 94 bpm     4 97 % Room Air 92 bpm     5 98 % Room Air 96 bpm     6  97 % Room Air 96 bpm 133/58 3   Recovery        Minute        1 97 % Room Air 77 bpm     2 99 % Room Air 75 bpm     3 99 % Room Air 69 bpm     4 100 % Room Air 70 bpm 131/60 1     Six Minute Walk Summary  6MWT Status: completed without stopping  Patient Reported: Dyspnea, Lightheadedness, Dizziness (Left Knee Pain)     Interpretation:  Did the patient stop or pause?: No                                         Total Time Walked (Calculated): 360 seconds  Final Partial Lap Distance (feet): 25 feet  Total Distance Meters (Calculated): 251.46 meters  Predicted Distance Meters (Calculated): 441.8 meters  Percentage of Predicted (Calculated): 56.92  Peak VO2 (Calculated): 11.52  Mets: 3.29  Has The Patient Had a Previous Six Minute Walk Test?: Yes       Previous 6MWT Results  Has The Patient Had a Previous Six Minute Walk Test?: Yes  Date of Previous Test: 11/02/23  Total Time Walked: 360 seconds  Total Distance (meters): 365.76  Predicted Distance (meters): 457.89 meters  Percentage of Predicted: 79.88  Percent Change (Calculated): 0.31

## 2024-09-03 NOTE — PROGRESS NOTES
Smw done. Patient is in left knee brace. Patient having total knee replacement surgery on left knee soon.

## 2024-09-04 LAB
BRPFT: NORMAL
FEF 25 75 LLN: 1.22
FEF 25 75 PRE REF: 60.8 %
FEF 25 75 REF: 2.62
FEV1 FVC LLN: 67
FEV1 FVC PRE REF: 99.5 %
FEV1 FVC REF: 79
FEV1 LLN: 1.52
FEV1 PRE REF: 82.3 %
FEV1 REF: 2.05
FVC LLN: 1.93
FVC PRE REF: 82.3 %
FVC REF: 2.59
PEF LLN: 3.9
PEF PRE REF: 96.9 %
PEF REF: 5.4
PRE FEF 25 75: 1.59 L/S (ref 1.22–4.01)
PRE FET 100: 8.84 SEC
PRE FEV1 FVC: 78.86 % (ref 66.53–90.18)
PRE FEV1: 1.68 L (ref 1.52–2.56)
PRE FVC: 2.14 L (ref 1.93–3.29)
PRE PEF: 5.23 L/S (ref 3.9–6.9)

## 2024-09-10 ENCOUNTER — PATIENT MESSAGE (OUTPATIENT)
Dept: PULMONOLOGY | Facility: CLINIC | Age: 64
End: 2024-09-10

## 2024-09-10 ENCOUNTER — OFFICE VISIT (OUTPATIENT)
Dept: PULMONOLOGY | Facility: CLINIC | Age: 64
End: 2024-09-10
Attending: NURSE PRACTITIONER
Payer: MEDICARE

## 2024-09-10 DIAGNOSIS — J82.83 EOSINOPHILIC ASTHMA: ICD-10-CM

## 2024-09-10 DIAGNOSIS — J44.89 ASTHMA WITH COPD: ICD-10-CM

## 2024-09-10 DIAGNOSIS — Z01.811 PREOP PULMONARY/RESPIRATORY EXAM: Primary | ICD-10-CM

## 2024-09-10 DIAGNOSIS — R53.83 MALAISE AND FATIGUE: ICD-10-CM

## 2024-09-10 DIAGNOSIS — R06.83 SNORING: ICD-10-CM

## 2024-09-10 DIAGNOSIS — J44.89 ASTHMA-COPD OVERLAP SYNDROME: ICD-10-CM

## 2024-09-10 DIAGNOSIS — J41.8 MIXED SIMPLE AND MUCOPURULENT CHRONIC BRONCHITIS: ICD-10-CM

## 2024-09-10 DIAGNOSIS — J44.9 CHRONIC OBSTRUCTIVE PULMONARY DISEASE, UNSPECIFIED COPD TYPE: ICD-10-CM

## 2024-09-10 DIAGNOSIS — J47.9 ADULT BRONCHIECTASIS: Chronic | ICD-10-CM

## 2024-09-10 DIAGNOSIS — F17.210 CIGARETTE NICOTINE DEPENDENCE WITHOUT COMPLICATION: ICD-10-CM

## 2024-09-10 DIAGNOSIS — F17.211 NICOTINE DEPENDENCE, CIGARETTES, IN REMISSION: ICD-10-CM

## 2024-09-10 DIAGNOSIS — R53.81 MALAISE AND FATIGUE: ICD-10-CM

## 2024-09-10 PROBLEM — Z99.81 ON HOME OXYGEN THERAPY: Status: RESOLVED | Noted: 2024-03-05 | Resolved: 2024-09-10

## 2024-09-10 RX ORDER — IPRATROPIUM BROMIDE AND ALBUTEROL SULFATE 2.5; .5 MG/3ML; MG/3ML
3 SOLUTION RESPIRATORY (INHALATION) EVERY 6 HOURS PRN
Qty: 300 ML | Refills: 11 | Status: SHIPPED | OUTPATIENT
Start: 2024-09-10 | End: 2025-09-10

## 2024-09-10 RX ORDER — FLUTICASONE FUROATE, UMECLIDINIUM BROMIDE AND VILANTEROL TRIFENATATE 200; 62.5; 25 UG/1; UG/1; UG/1
1 POWDER RESPIRATORY (INHALATION) DAILY
Qty: 60 EACH | Refills: 11 | Status: SHIPPED | OUTPATIENT
Start: 2024-09-10

## 2024-09-10 RX ORDER — ALBUTEROL SULFATE 90 UG/1
2 INHALANT RESPIRATORY (INHALATION) EVERY 4 HOURS PRN
Qty: 8.5 G | Refills: 11 | Status: SHIPPED | OUTPATIENT
Start: 2024-09-10

## 2024-09-10 NOTE — PROGRESS NOTES
Subjective:      The patient location is:  [id number] [place] [address]  [place] [address]    The chief complaint leading to consultation is: Pre op evaluation   Visit type: Virtual visit with synchronous audio and video     Each patient to whom he or she provides medical services by telemedicine is:  (1) informed of the relationship between the physician and patient and the respective role of any other health care provider with respect to management of the patient; and (2) notified that he or she may decline to receive medical services by telemedicine and may withdraw from such care at any time.    Notes: Doing well       Patient ID: Kenya Majano is a 64 y.o. female.    Chief Complaint:        HPI  COPD  She presents for evaluation and treatment of COPD. The patient is not currently having symptoms / an exacerbation. Current symptoms include chronic dyspnea, dyspnea after 1/2 blocks, non-productive cough, and wheezing. Symptoms have been present since several years ago and are stable. She denies weight loss, chills, and fever. Associated symptoms include fatigue, poor exercise tolerance, and shortness of breath.  She uses 1 pillows at night. Patient currently is not on home oxygen therapy.. The patient is having no constitutional symptoms, denying fever, chills, anorexia, or weight loss. The patient has been hospitalized for this condition before. She has a history of 41 pack years. The patient is experiencing exercise intolerance       Past Medical History:   Diagnosis Date    Adult bronchiectasis 2023    Anxiety     Arthritis     Asthma     Cataract     Coronary artery disease     Emphysema of lung     Glaucoma     Hypertension     Seizures     Stroke      Past Surgical History:   Procedure Laterality Date    APPENDECTOMY       SECTION      CHOLECYSTECTOMY      EYE SURGERY      HYSTERECTOMY      around     OOPHORECTOMY      around      Review of patient's allergies  indicates:   Allergen Reactions    Sulfa (sulfonamide antibiotics) Swelling    Dimenhydrinate Other (See Comments)    Sumatriptan     Latex Rash       Current Outpatient Medications on File Prior to Visit   Medication Sig Dispense Refill    albuterol (PROVENTIL) 2.5 mg /3 mL (0.083 %) nebulizer solution Take 3 mLs (2.5 mg total) by nebulization every 4 (four) hours as needed for Wheezing or Shortness of Breath (cough). Rescue 360 mL 11    bimatoprost (LUMIGAN) 0.03 % ophthalmic drops Apply 1 drop to eye. (Patient not taking: Reported on 5/16/2024)      brinzolamide-brimonidine (SIMBRINZA) 1-0.2 % DrpS Apply 1 drop to eye.      carBAMazepine (TEGRETOL) 200 mg tablet Take 200 mg by mouth 2 (two) times daily.      clonazePAM (KLONOPIN) 1 MG tablet Take 1 tablet (1 mg total) by mouth 2 (two) times daily. 60 tablet 5    clopidogreL (PLAVIX) 75 mg tablet Take 1 tablet (75 mg total) by mouth once daily. 90 tablet 0    dupilumab (DUPIXENT PEN) 300 mg/2 mL PnIj Inject 2 mLs (300 mg total) into the skin every 14 (fourteen) days. 4 mL 11    ezetimibe (ZETIA) 10 mg tablet Take 1 tablet (10 mg total) by mouth once daily. 90 tablet 3    famotidine (PEPCID) 20 MG tablet Take 1 tablet (20 mg total) by mouth 2 (two) times daily. 180 tablet 0    fluticasone propionate (FLONASE) 50 mcg/actuation nasal spray 2 sprays by Nasal route daily as needed.      LINZESS 290 mcg Cap capsule Take 1 capsule (290 mcg total) by mouth before breakfast. 90 capsule 0    lisinopriL-hydrochlorothiazide (PRINZIDE,ZESTORETIC) 20-25 mg Tab Take 1 tablet by mouth once daily. 90 tablet 0    prednisoLONE acetate (PRED FORTE) 1 % DrpS Place 1 drop into the right eye.      promethazine-dextromethorphan (PROMETHAZINE-DM) 6.25-15 mg/5 mL Syrp Take 5 mLs by mouth nightly as needed (Cough). (Patient not taking: Reported on 5/16/2024) 120 mL 0    rosuvastatin (CRESTOR) 40 MG Tab Take 1 tablet (40 mg total) by mouth once daily. 90 tablet 0     sertraline (ZOLOFT) 100 MG tablet Take 1 tablet (100 mg total) by mouth once daily. 90 tablet 0    sodium chloride for inhalation (SODIUM CHLORIDE 0.9%) 0.9 % nebulizer solution Take 3 mLs by nebulization as needed for Wheezing (chest congestion). 300 mL 11    verapamiL (CALAN-SR) 120 MG CR tablet Take 120 mg by mouth. (Patient not taking: Reported on 2024)      [DISCONTINUED] albuterol (PROVENTIL/VENTOLIN HFA) 90 mcg/actuation inhaler Inhale 2 puffs into the lungs every 4 (four) hours as needed for Wheezing. 8.5 g 2    [DISCONTINUED] albuterol-ipratropium (DUO-NEB) 2.5 mg-0.5 mg/3 mL nebulizer solution Take 3 mLs by nebulization every 6 (six) hours as needed for Wheezing or Shortness of Breath. Rescue 300 mL 11    [DISCONTINUED] montelukast (SINGULAIR) 10 mg tablet Take 1 tablet (10 mg total) by mouth every evening. (Patient not taking: Reported on 2024) 90 tablet 3    [DISCONTINUED] predniSONE (DELTASONE) 20 MG tablet Prednisone 60 mg/ day for 3 days, 40 mg/day for 3 days,20 mg/ day for 3 days, (1/2 tablet )10 mg a day for 3 days. (Patient not taking: Reported on 2024) 20 tablet 0    [DISCONTINUED] TRELEGY ELLIPTA 200-62.5-25 mcg inhaler INHALE 1 PUFF INTO THE LUNGS ONCE DAILY 60 each 0     No current facility-administered medications on file prior to visit.     Social History     Socioeconomic History    Marital status:     Number of children: 3   Tobacco Use    Smoking status: Former     Current packs/day: 0.00     Average packs/day: 1 pack/day for 48.0 years (48.0 ttl pk-yrs)     Types: Cigarettes     Start date: 1975     Quit date: 2023     Years since quittin.6     Passive exposure: Never    Smokeless tobacco: Never   Substance and Sexual Activity    Alcohol use: Never    Drug use: Never    Sexual activity: Yes     Partners: Male     Social Determinants of Health     Financial Resource Strain: Medium Risk (2024)    Received from [facility]  [facility] Subsidi[facility] and [facility]    Overall Financial Resource Strain (CARDIA)     Difficulty of Paying Living Expenses: Somewhat hard   Food Insecurity: No Food Insecurity (8/23/2024)    [facility]    Hunger Vital Sign     Worried About Running Out of Food in the Last Year: Never true     Ran Out of Food in the Last Year: Never true   Transportation Needs: No Transportation Needs (8/23/2024)    [facility]    PRAPARE - Transportation     Lack of Transportation (Medical): No     Lack of Transportation (Non-Medical): No   Physical Activity: Insufficiently Active (8/23/2024)    Received [facility]    Exercise Vital Sign     Days of Exercise per Week: 3 days     Minutes of Exercise per Session: 10 min   Stress: Stress Concern Present (8/23/2024)    Received from [facility]    [employer] - Occupational Stress Questionnaire     Feeling of Stress : To some extent   Housing Stability: Low Risk  (8/23/2024)    Received from [facility]    Housing Stability Vital Sign     Unable to Pay for Housing in the Last Year: No     Number of Times Moved in the Last Year: 0     Homeless in the Last Year: No     @FAM@  Review of Systems   Constitutional:  Positive for fatigue. Negative for fever.   HENT:  Positive for postnasal drip, rhinorrhea and congestion.    Eyes:  Negative for redness and itching.   Respiratory:  Positive for cough, sputum production, shortness of breath, dyspnea on extertion, use of rescue inhaler and Paroxysmal Nocturnal Dyspnea.    Cardiovascular:   Negative for chest pain, palpitations and leg swelling.   Genitourinary:  Negative for difficulty urinating and hematuria.   Endocrine:  Negative for cold intolerance and heat intolerance.    Skin:  Negative for rash.   Gastrointestinal:  Negative for nausea and abdominal pain.   Neurological:  Negative for dizziness, syncope, weakness and light-headedness.   Hematological:  Negative for adenopathy. Does not bruise/bleed easily.   Psychiatric/Behavioral:  Negative for sleep disturbance. The patient is not nervous/anxious.      Objective:        LMP  (LMP Unknown)     The patient has been recording vital signs at home and the following data was reviewed to make a evaluation and management decision:  Review of data:       No data to display                   No data to display              Wt Readings from Last 1 Encounters:   09/03/24 74.7 kg (164 lb 10.9 oz)     Ht Readings from Last 1 Encounters:   09/03/24 5' (1.524 m)       Constitutional: Patient is oriented to person, place and time.  He appears well developed and well nourished.  Neurologic: He is alert and orientated.  Psychiatric: He has appropriate mood and affect. His behavior during the interview as normal. Judgement and thought content - normal.      Personal Diagnostic Review   Latest Reference Range & Units 09/03/24 09:00   POC PH 7.35 - 7.45  7.424   POC PCO2 35 - 45 mmHg 43.9   POC PO2 80 - 100 mmHg 89   POC HCO3 24 - 28 mmol/L 28.8 (H)   POC SATURATED O2 95 - 100 % 97   Sample  ARTERIAL   POC BE -2 to 2 mmol/L 4 (H)   FiO2  21   DelSys  Room Air   Site  RR   Mode  SPONT   (H): Data is abnormally high        CT reviewed  CT Chest Lung Screening Low Dose  Narrative: Exam:  CT CHEST LUNG SCREENING LOW DOSE    CLINICAL HISTORY: Lung cancer screening, >= 30 pk-yr smoking history in last 15 yrs (Age 55-80y); [F17.210]-Nicotine dependence, cigarettes, uncomplicated.    COMPARISON:  07/10/2023    TECHNIQUE:  Axial CT imaging was performed of the chest  utilizing a low-dose protocol.    Computed tomography dose index (CTDI):  3.81 mGy  Dose-length product (DLP):  137.38 mGy-cm    FINDINGS:  Lungs: There are no significant pulmonary nodules that require further evaluation.  Stable small bilateral calcified and noncalcified pulmonary nodules measuring up to 3 mm in the left lower lobe on image 289 series 4.  Stable centrilobular emphysema.    Pleura: No pleural effusion.    Heart and pericardium: Normal size without effusion.    Aorta and vasculature: Atherosclerosis of aorta.  No significant calcifications of the coronary arteries.    Chest wall and skeletal structures: No acute or suspicious findings.    Upper abdomen: No acute or suspicious findings.  Impression: Lung-RADS Category:  2 - Benign Appearance or Behavior - continue annual screening with LDCT in 12 months.    Clinically significant non lung cancer finding:  None.    Prior Lung Cancer Modifier:  No history of prior lung cancer.    All CT scans at [this location] are performed using dose modulation techniques as appropriate to a performed exam including the following: Automated exposure control; adjustment of the mA and/or kV according to patient size (this includes techniques or standardized protocols for targeted exams where dose is matched to indication / reason for exam; i.e. extremities or head); use of iterative reconstruction technique.    Finalized on: 9/3/2024 10:05 AM By:  Senthil Hernandez MD  [facility]# [id number]      2024-09-03 10:07:20.192    [facility]      Office Spirometry Results:         9/3/2024     9:23 AM 8/14/2024     8:46 AM 5/16/2024     2:29 PM 5/3/2024     8:41 AM 3/6/2024     1:09 PM 2/15/2024     9:19 AM 1/17/2024     9:34 AM   Pulmonary Function Tests   SpO2    98 %   95 %   Ordering Provider LIS Topete         Performing nurse/tech/RT CELESTE Montes RRT         Height 5' (1.524 m) 5' (1.524 m)  5' (1.524 m)      Weight 74.7 kg (164 lb 10.9 oz) 74.1 kg (163 lb 5.8 oz) 68.9 kg (151 lb 14.4 oz) 68.4 kg  (150 lb 12.7 oz) 69.3 kg (152 lb 12.5 oz) 69.8 kg (153 lb 14.1 oz) 71 kg (156 lb 8.4 oz)   BMI (Calculated) 32.2 31.9  29.5      6MWT Status completed without stopping         Patient Reported Dyspnea;Lightheadedness;Dizziness         Was O2 used? No         6MW Distance walked (feet) 825 feet         Distance walked (meters) 251.46 meters         Did patient stop? No         Type of assistive device(s) used? a walker         Oxygen Saturation 100 %         Supplemental Oxygen Room Air         Heart Rate 68 bpm         Blood Pressure 94/46         Mookie Dyspnea Rating  nothing at all         Oxygen Saturation 97 %         Supplemental Oxygen Room Air         Heart Rate 96 bpm         Blood Pressure 133/58         Mookie Dyspnea Rating  moderate         Recovery Time (seconds) 240 seconds         Oxygen Saturation 100 %         Supplemental Oxygen Room Air         Heart Rate 70 bpm         Blood Pressure 131/60         Mookie Dyspnea Rating  very light         Is procedure ready for interpretation? Yes         Oxygen Qualification? No               9/3/2024     9:23 AM   Pulmonary Studies Review   Ordering Provider LIS Topete   Interpreting Provider    Performing nurse/tech/RT CELESTE Montes RRT   Height 5' (1.524 m)   Weight 74.7 kg (164 lb 10.9 oz)   BMI (Calculated) 32.2   Predicted Distance 298.12   6MWT Status completed without stopping   Patient Reported Dyspnea;Lightheadedness;Dizziness   Was O2 used? No   6MW Distance walked (feet) 825 feet   Distance walked (meters) 251.46 meters   Did patient stop? No   Type of assistive device(s) used? a walker   Is extra documentation required for this patient? Yes   Oxygen Saturation 100 %   Supplemental Oxygen Room Air   Heart Rate 68 bpm   Blood Pressure 94/46   Mookie Dyspnea Rating  nothing at all   Oxygen Saturation 97 %   Supplemental Oxygen Room Air   Heart Rate 96 bpm   Blood Pressure 133/58   Mookie Dyspnea Rating  moderate   Recovery Time (seconds) 240 seconds    Oxygen Saturation 100 %   Supplemental Oxygen Room Air   Heart Rate 70 bpm   Blood Pressure 131/60   Mookie Dyspnea Rating  very light   Is procedure ready for interpretation? Yes   Did the patient stop or pause? No   Total Time Walked (Calculated) 360 seconds   Total Laps Walked 4   Final Partial Lap Distance (feet) 25 feet   Total Distance Feet (Calculated) 825 feet   Total Distance Meters (Calculated) 251.46 meters   Predicted Distance Meters (Calculated) 441.8 meters   Percentage of Predicted (Calculated) 56.92   Peak VO2 (Calculated) 11.52   Mets 3.29   Has The Patient Had a Previous Six Minute Walk Test? Yes   Oxygen Qualification? No   [place] [facility]-Pulmonary Function 3rdFl  Six Minute Walk      SUMMARY      Ordering Provider: LIS Topete   Interpreting Provider:   Performing nurse/tech/RT: CELESTE Montes RRT  Diagnosis:  (Shortness of Breath on Exertion)  Height: 5' (152.4 cm)  Weight: 74.7 kg (164 lb 10.9 oz)  BMI (Calculated): 32.2                                                                                 Phase Oxygen Assessment Supplemental O2 Heart   Rate Blood Pressure Mookie Dyspnea Scale Rating   Resting 100 % Room Air 68 bpm 94/46 0   Exercise             Minute             1 98 % Room Air 85 bpm       2 96 % Room Air 96 bpm       3 96 % Room Air 94 bpm       4 97 % Room Air 92 bpm       5 98 % Room Air 96 bpm       6  97 % Room Air 96 bpm 133/58 3   Recovery             Minute             1 97 % Room Air 77 bpm       2 99 % Room Air 75 bpm       3 99 % Room Air 69 bpm       4 100 % Room Air 70 bpm 131/60 1      Six Minute Walk Summary  6MWT Status: completed without stopping  Patient Reported: Dyspnea, Lightheadedness, Dizziness (Left Knee Pain)      Interpretation:  Did the patient stop or pause?: No  Total Time Walked (Calculated): 360 seconds  Final Partial Lap Distance (feet): 25 feet  Total Distance Meters (Calculated): 251.46 meters  Predicted Distance Meters (Calculated): 441.8  meters  Percentage of Predicted (Calculated): 56.92  Peak VO2 (Calculated): 11.52  Mets: 3.29  Has The Patient Had a Previous Six Minute Walk Test?: Yes     Previous 6MWT Results  Has The Patient Had a Previous Six Minute Walk Test?: Yes  Date of Previous Test: 11/02/23  Total Time Walked: 360 seconds  Total Distance (meters): 365.76  Predicted Distance (meters): 457.89 meters  Percentage of Predicted: 79.88  Percent Change (Calculated): 0.31       PREVIOUS 6 Min WalkThe [place]-Pulmonary Function 3rdFl  Six Minute Walk   SUMMARY   Ordering Provider: Álvaro Sanchez MD        Interpreting Provider: Álvaro Sanchez MD  Performing nurse/tech/RT: VT, RT  Diagnosis:  (Exercise hypoxemia)  Height: 5' (152.4 cm)  Weight: 63.6 kg (140 lb 3.4 oz)  BMI (Calculated): 27.4              Patient Race:           Phase Oxygen Assessment Supplemental O2 Heart   Rate Blood Pressure Mookie Dyspnea Scale Rating   Resting 92 % Room Air 118 bpm 97/63 0   Exercise             Minute             1 92 % Room Air 118 bpm       2 91 % Room Air 126 bpm       3 93 % Room Air 129 bpm       4 91 % Room Air 130 bpm       5 88 % Room Air 136 bpm       6  94 % 2 L/M 138 bpm 170/78 9   Recovery             Minute             1 94 % 2 L/M 123 bpm       2 96 % 2 L/M 116 bpm       3 96 % 2 L/M 112 bpm       4 96 % 2 L/M 110 bpm 168/69 5-6      Six Minute Walk Summary  6MWT Status: completed without stopping  Patient Reported: Dyspnea         Interpretation:  Did the patient stop or pause?: No  Total Time Walked (Calculated): 360 seconds  Final Partial Lap Distance (feet): 0 feet  Total Distance Meters (Calculated): 365.76 meters  Predicted Distance Meters (Calculated): 478.78 meters  Percentage of Predicted (Calculated): 76.39  Peak VO2 (Calculated): 14.95  Mets: 4.27  Has The Patient Had a Previous Six Minute Walk Test?: No     Previous 6MWT Results  Has The Patient Had a Previous Six Minute Walk Test?: No        Interpretation:  Total distance  walked in six minutes is mildly reduced indicating an mild reduction in functional capacity.  There was significant severe oxygen desaturation to 88 % with exercise on room air (oxygen saturation less than 89%). Supplemental oxygen was administered for the remainder of the test as noted above. Clinical correlation suggested.  Patient met criteria for oxygen prescription.  [] Mild exercise-induced hypoxemia described as an arterial oxygen saturation of 93-95% (with a fall of 3-4% with exercise),   [] Moderate exercise-induced hypoxemia as a fall in oxygen saturation to  89-93% (with a fall of 3-4 % with exercise)  [x] Severe exercise induced hypoxemia as < 89% O2 saturation (88% and below).  Medicare Criteria for Oxygen prescription comments: When arterial oxygen saturation is at or below 88% during exercise (severe exercise induced hypoxemia) then the patient meets criteria for oxygen prescription.  Details about Medicare Group Criteria coverage can be found at http://www.cms.[facility].gov/manuals/downloads/      Álvaro Sanchez MD              Assessment:       Preop pulmonary/respiratory exam  Clearance for surgery:  The patient is at an increased risk of complications from surgery due to multiple medical problems including COPD. Vaccination status reviewed and updated. Risks of possible complications of surgery discussed in detail including risk of respiratory failure requiring mechanical ventilation, post operative pneumonia, deep venous thrombosis, pulmonary embolism and death.  Pulmonary function studies, arterial blood gases and chest X ray reports are independently reviewed. Methods of improving lung function prior to surgery including incentive spirometry, regular use of bronchodilators, exercise and respiratory toilet discussed. Patient voices understanding of increased risks involved due to compromised pulmonary status and the need to comply with treatment plan prior to surgery.  The patient is cleared for  anesthesia and surgery from a pulmonary standpoint.    Preop pulmonary/respiratory exam    Malaise and fatigue  -     Ambulatory referral/consult to Pulmonology    Snoring  -     Ambulatory referral/consult to Pulmonology    Asthma-COPD overlap syndrome    Asthma with COPD  -     HME - OTHER    Cigarette nicotine dependence without complication  -     CT Chest Lung Screening Low Dose; Future; Expected date: 09/10/2024    Nicotine dependence, cigarettes, in remission  -     CT Chest Lung Screening Low Dose; Future; Expected date: 09/10/2024    Chronic obstructive pulmonary disease, unspecified COPD type  -     albuterol (PROVENTIL/VENTOLIN HFA) 90 mcg/actuation inhaler; Inhale 2 puffs into the lungs every 4 (four) hours as needed for Wheezing.  Dispense: 8.5 g; Refill: 11    Mixed simple and mucopurulent chronic bronchitis  -     albuterol-ipratropium (DUO-NEB) 2.5 mg-0.5 mg/3 mL nebulizer solution; Take 3 mLs by nebulization every 6 (six) hours as needed for Wheezing or Shortness of Breath. Rescue  Dispense: 300 mL; Refill: 11  -     fluticasone-umeclidin-vilanter (TRELEGY ELLIPTA) 200-62.5-25 mcg inhaler; Inhale 1 puff into the lungs once daily.  Dispense: 60 each; Refill: 11          Outpatient Encounter Medications as of 9/10/2024   Medication Sig Dispense Refill    albuterol (PROVENTIL) 2.5 mg /3 mL (0.083 %) nebulizer solution Take 3 mLs (2.5 mg total) by nebulization every 4 (four) hours as needed for Wheezing or Shortness of Breath (cough). Rescue 360 mL 11    albuterol (PROVENTIL/VENTOLIN HFA) 90 mcg/actuation inhaler Inhale 2 puffs into the lungs every 4 (four) hours as needed for Wheezing. 8.5 g 11    albuterol-ipratropium (DUO-NEB) 2.5 mg-0.5 mg/3 mL nebulizer solution Take 3 mLs by nebulization every 6 (six) hours as needed for Wheezing or Shortness of Breath. Rescue 300 mL 11    bimatoprost (LUMIGAN) 0.03 % ophthalmic drops Apply 1 drop to eye. (Patient not taking: Reported on 5/16/2024)       brinzolamide-brimonidine (SIMBRINZA) 1-0.2 % DrpS Apply 1 drop to eye.      carBAMazepine (TEGRETOL) 200 mg tablet Take 200 mg by mouth 2 (two) times daily.      clonazePAM (KLONOPIN) 1 MG tablet Take 1 tablet (1 mg total) by mouth 2 (two) times daily. 60 tablet 5    clopidogreL (PLAVIX) 75 mg tablet Take 1 tablet (75 mg total) by mouth once daily. 90 tablet 0    dupilumab (DUPIXENT PEN) 300 mg/2 mL PnIj Inject 2 mLs (300 mg total) into the skin every 14 (fourteen) days. 4 mL 11    ezetimibe (ZETIA) 10 mg tablet Take 1 tablet (10 mg total) by mouth once daily. 90 tablet 3    famotidine (PEPCID) 20 MG tablet Take 1 tablet (20 mg total) by mouth 2 (two) times daily. 180 tablet 0    fluticasone propionate (FLONASE) 50 mcg/actuation nasal spray 2 sprays by Nasal route daily as needed.      fluticasone-umeclidin-vilanter (TRELEGY ELLIPTA) 200-62.5-25 mcg inhaler Inhale 1 puff into the lungs once daily. 60 each 11    LINZESS 290 mcg Cap capsule Take 1 capsule (290 mcg total) by mouth before breakfast. 90 capsule 0    lisinopriL-hydrochlorothiazide (PRINZIDE,ZESTORETIC) 20-25 mg Tab Take 1 tablet by mouth once daily. 90 tablet 0    prednisoLONE acetate (PRED FORTE) 1 % DrpS Place 1 drop into the right eye.      promethazine-dextromethorphan (PROMETHAZINE-DM) 6.25-15 mg/5 mL Syrp Take 5 mLs by mouth nightly as needed (Cough). (Patient not taking: Reported on 5/16/2024) 120 mL 0    rosuvastatin (CRESTOR) 40 MG Tab Take 1 tablet (40 mg total) by mouth once daily. 90 tablet 0    sertraline (ZOLOFT) 100 MG tablet Take 1 tablet (100 mg total) by mouth once daily. 90 tablet 0    sodium chloride for inhalation (SODIUM CHLORIDE 0.9%) 0.9 % nebulizer solution Take 3 mLs by nebulization as needed for Wheezing (chest congestion). 300 mL 11    verapamiL (CALAN-SR) 120 MG CR tablet Take 120 mg by mouth. (Patient not taking: Reported on 5/16/2024)      [DISCONTINUED] albuterol (PROVENTIL/VENTOLIN HFA) 90 mcg/actuation  inhaler Inhale 2 puffs into the lungs every 4 (four) hours as needed for Wheezing. 8.5 g 2    [DISCONTINUED] albuterol-ipratropium (DUO-NEB) 2.5 mg-0.5 mg/3 mL nebulizer solution Take 3 mLs by nebulization every 6 (six) hours as needed for Wheezing or Shortness of Breath. Rescue 300 mL 11    [DISCONTINUED] montelukast (SINGULAIR) 10 mg tablet Take 1 tablet (10 mg total) by mouth every evening. (Patient not taking: Reported on 8/14/2024) 90 tablet 3    [DISCONTINUED] predniSONE (DELTASONE) 20 MG tablet Prednisone 60 mg/ day for 3 days, 40 mg/day for 3 days,20 mg/ day for 3 days, (1/2 tablet )10 mg a day for 3 days. (Patient not taking: Reported on 5/16/2024) 20 tablet 0    [DISCONTINUED] TRELEGY ELLIPTA 200-62.5-25 mcg inhaler INHALE 1 PUFF INTO THE LUNGS ONCE DAILY 60 each 0     No facility-administered encounter medications on file as of 9/10/2024.     Plan:       Requested Prescriptions     Signed Prescriptions Disp Refills    albuterol (PROVENTIL/VENTOLIN HFA) 90 mcg/actuation inhaler 8.5 g 11     Sig: Inhale 2 puffs into the lungs every 4 (four) hours as needed for Wheezing.    albuterol-ipratropium (DUO-NEB) 2.5 mg-0.5 mg/3 mL nebulizer solution 300 mL 11     Sig: Take 3 mLs by nebulization every 6 (six) hours as needed for Wheezing or Shortness of Breath. Rescue    fluticasone-umeclidin-vilanter (TRELEGY ELLIPTA) 200-62.5-25 mcg inhaler 60 each 11     Sig: Inhale 1 puff into the lungs once daily.     Problem List Items Addressed This Visit       Cigarette nicotine dependence without complication    Overview     48 pack year former smoker. Quit January 2023.   7/10/2023 LDCT Benign Appearance or Behavior - continue annual screening with LDCT in 12 months, July 2024.          Relevant Orders    CT Chest Lung Screening Low Dose    Malaise and fatigue    Mixed simple and mucopurulent chronic bronchitis    Overview     Trelegy 200 mcg, Albuterol inhaler, Albuterol nebs. Duo nebs.            Relevant  Medications    albuterol-ipratropium (DUO-NEB) 2.5 mg-0.5 mg/3 mL nebulizer solution    fluticasone-umeclidin-vilanter (TRELEGY ELLIPTA) 200-62.5-25 mcg inhaler    Preop pulmonary/respiratory exam - Primary    Current Assessment & Plan     Clearance for surgery:  The patient is at an increased risk of complications from surgery due to multiple medical problems including COPD. Vaccination status reviewed and updated. Risks of possible complications of surgery discussed in detail including risk of respiratory failure requiring mechanical ventilation, post operative pneumonia, deep venous thrombosis, pulmonary embolism and death.  Pulmonary function studies, arterial blood gases and chest X ray reports are independently reviewed. Methods of improving lung function prior to surgery including incentive spirometry, regular use of bronchodilators, exercise and respiratory toilet discussed. Patient voices understanding of increased risks involved due to compromised pulmonary status and the need to comply with treatment plan prior to surgery.  The patient is cleared for anesthesia and surgery from a pulmonary standpoint.           Snoring     Other Visit Diagnoses       Asthma-COPD overlap syndrome        Asthma with COPD        Relevant Orders    HME - OTHER    Nicotine dependence, cigarettes, in remission        Relevant Orders    CT Chest Lung Screening Low Dose    Chronic obstructive pulmonary disease, unspecified COPD type        Relevant Medications    albuterol (PROVENTIL/VENTOLIN HFA) 90 mcg/actuation inhaler               Follow up in about 1 year (around 9/10/2025) for CT - on return visit.    MEDICAL DECISION MAKING: Moderate to high complexity.  Overall, the multiple problems listed are of moderate to high severity that may impact quality of life and activities of daily living. Side effects of medications, treatment plan as well as options and alternatives reviewed and discussed with patient. There was counseling  of patient concerning these issues.    Total time spent in counseling and coordination of care - 35  minutes of total time spent on the encounter, which includes face to face time and non-face to face time preparing to see the patient (eg, review of tests), Obtaining and/or reviewing separately obtained history, Documenting clinical information in the electronic or other health record, Independently interpreting results (not separately reported) and communicating results to the patient/family/caregiver, or Care coordination (not separately reported).    Time was used in discussion of prognosis, risks, benefits of treatment, instructions and compliance with regimen . Discussion with other physicians and/or health care providers - home health or for use of durable medical equipment (oxygen, nebulizers, CPAP, BiPAP) occurred.        This service was not originating from a related E/M service provided within the previous 7 days nor will  to an E/M service or procedure within the next 24 hours or my soonest available appointment.  Prevailing standard of care was able to be met in this visit.

## 2024-09-10 NOTE — LETTER
September 10, 2024        David Martinez MD  [address]  [place] [address]     [facility]  [address] [place] [address] DR JAMESON PINK [id number]  Phone: [phone]  Fax: [phone]   Patient: Kenya Majano   MR Number: [id number]   YOB: 1960   Date of Visit: 9/10/2024       Dear Dr. Ej Martinez:    Thank you for referring Kenya Majano to me for evaluation. Below are the relevant portions of my assessment and plan of care.            If you have questions, please do not hesitate to call me. I look forward to following Kenya along with you.    Sincerely,      Álvaro Sanchez MD           CC  No Recipients

## 2024-09-27 ENCOUNTER — PATIENT MESSAGE (OUTPATIENT)
Dept: ADMINISTRATIVE | Facility: OTHER | Age: 64
End: 2024-09-27
Payer: MEDICARE

## 2024-10-24 ENCOUNTER — PATIENT MESSAGE (OUTPATIENT)
Dept: ADMINISTRATIVE | Facility: OTHER | Age: 64
End: 2024-10-24
Payer: MEDICARE

## 2024-11-21 ENCOUNTER — OFFICE VISIT (OUTPATIENT)
Dept: ALLERGY | Facility: CLINIC | Age: 64
End: 2024-11-21
Payer: MEDICARE

## 2024-11-21 VITALS
HEART RATE: 91 BPM | DIASTOLIC BLOOD PRESSURE: 83 MMHG | SYSTOLIC BLOOD PRESSURE: 122 MMHG | WEIGHT: 165.81 LBS | BODY MASS INDEX: 32.38 KG/M2 | TEMPERATURE: 98 F

## 2024-11-21 DIAGNOSIS — J31.0 PERENNIAL NON-ALLERGIC RHINITIS: ICD-10-CM

## 2024-11-21 DIAGNOSIS — R53.82 CHRONIC FATIGUE AND MALAISE: ICD-10-CM

## 2024-11-21 DIAGNOSIS — J47.9 ADULT BRONCHIECTASIS: ICD-10-CM

## 2024-11-21 DIAGNOSIS — Z99.81 ON HOME OXYGEN THERAPY: ICD-10-CM

## 2024-11-21 DIAGNOSIS — G25.81 RLS (RESTLESS LEGS SYNDROME): ICD-10-CM

## 2024-11-21 DIAGNOSIS — Z87.891 EX-CIGARETTE SMOKER: ICD-10-CM

## 2024-11-21 DIAGNOSIS — E66.9 OBESITY, UNSPECIFIED CLASS, UNSPECIFIED OBESITY TYPE, UNSPECIFIED WHETHER SERIOUS COMORBIDITY PRESENT: ICD-10-CM

## 2024-11-21 DIAGNOSIS — R05.9 COUGH IN ADULT: ICD-10-CM

## 2024-11-21 DIAGNOSIS — R06.83 SNORING: ICD-10-CM

## 2024-11-21 DIAGNOSIS — R53.81 CHRONIC FATIGUE AND MALAISE: ICD-10-CM

## 2024-11-21 DIAGNOSIS — R06.5 MOUTH BREATHING: ICD-10-CM

## 2024-11-21 DIAGNOSIS — J44.89 ASTHMA-COPD OVERLAP SYNDROME: ICD-10-CM

## 2024-11-21 DIAGNOSIS — J82.83 EOSINOPHILIC ASTHMA: Primary | ICD-10-CM

## 2024-11-21 PROCEDURE — 99999 PR PBB SHADOW E&M-EST. PATIENT-LVL IV: CPT | Mod: PBBFAC,,, | Performed by: SPECIALIST

## 2024-11-21 NOTE — PROGRESS NOTES
Subjective:       Patient ID: Kenya Majano is a 64 y.o. female.    Chief Complaint:    Follow up on asthma- copd, eosinophilic adult- onset asthma, ex- cigarette smoker  Perennial non allergic rhinitis, adult bronchiectasis, chronic cough, RLS / Restless sleeper and obesity    HPI:  Accompanied by her .  She has asthma- COPD, tith Type - 2 endo type.. Is doing great on the current treatment plan, especially after starting on Dupulimab SC..     female, 64- year old with the above complaints-   For follow up visit.She is off of Oxygen. Is able to walk and move around more.  Dupixent 300 mg SC q  14 days, self- administered is helping very much.    Wheezes off and on,that is treated with nebulized Duoneb.eKnya is on Trelegy 200/ 62.5 / 25- an ICS- LAMA- LABA,   as an asthma controller and has a JENNIFER- Proair HFA for symptoms relief.  She is an ex- cigarette smoker.  Eczema is well controlled. Kenya has perennial non- allergic rhinitis.  She has RLS. She snores and mouth breathes- Requip had helped with RLS. Had to stop it because of night walking and eating at nights without realizing it.  Dr Rick Sanchez is her pulmonologist and sleep specialist.  May repeat polysomnogram and treat RLS    Kenya has non - allergic perennial rhinitis- that is treated symptomatically. 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                 Sulfa (sulfonamide antibiotics), Dimenhydrinate, Sumatriptan, and Latex -- allergies ewere reported    Past Medical History:   Diagnosis Date    Adult bronchiectasis 7/6/2023    Anxiety     Arthritis     Asthma     Cataract     Coronary artery disease     Emphysema of lung     Glaucoma     Hypertension     Seizures     Stroke        Family History   Problem Relation Name Age of Onset    Diabetes Mother      Stroke Mother      Heart disease Father      Diabetes Maternal Grandmother      No Known Problems Maternal Grandfather      Hearing loss Paternal  Grandmother      Hearing loss Paternal Grandfather         Environmental History: Dust Mite Controls: Dust mite controls are already in place.     Review of Systems   Constitutional: Negative.    HENT:  Positive for congestion and postnasal drip.    Eyes: Negative.    Respiratory:  Positive for cough and chest tightness.    Cardiovascular: Negative.    Gastrointestinal: Negative.    Endocrine: Negative.    Genitourinary: Negative.    Musculoskeletal: Negative.    Skin: Negative.    Allergic/Immunologic: Negative.    Neurological: Negative.    Hematological: Negative.    Psychiatric/Behavioral: Negative.       Objective:     Visit Vitals  LMP  (LMP Unknown)       Physical Exam  Vitals and nursing note reviewed. Exam conducted with a chaperone present.   Constitutional:       Appearance: Normal appearance. She is obese.   HENT:      Head: Normocephalic and atraumatic.      Right Ear: Tympanic membrane, ear canal and external ear normal.      Left Ear: Tympanic membrane, ear canal and external ear normal.      Nose: Congestion present.      Mouth/Throat:      Mouth: Mucous membranes are moist.      Pharynx: Oropharynx is clear.   Eyes:      Extraocular Movements: Extraocular movements intact.      Conjunctiva/sclera: Conjunctivae normal.      Pupils: Pupils are equal, round, and reactive to light.   Cardiovascular:      Rate and Rhythm: Normal rate and regular rhythm.      Pulses: Normal pulses.      Heart sounds: Normal heart sounds.   Pulmonary:      Effort: Pulmonary effort is normal.      Breath sounds: Normal breath sounds.   Abdominal:      General: Abdomen is flat. Bowel sounds are normal.      Palpations: Abdomen is soft.   Musculoskeletal:         General: Normal range of motion.      Cervical back: Normal range of motion and neck supple.   Skin:     General: Skin is warm and dry.      Capillary Refill: Capillary refill takes less than 2 seconds.   Neurological:      Mental Status: She is alert and oriented to  person, place, and time. Mental status is at baseline.   Psychiatric:         Mood and Affect: Mood normal.         Behavior: Behavior normal.         Thought Content: Thought content normal.         Judgment: Judgment normal.       Assessment:      1. Eosinophilic asthma    2. Asthma-COPD overlap syndrome    3. Perennial non-allergic rhinitis    4. Chronic fatigue and malaise    5. Obesity, unspecified class, unspecified obesity type, unspecified whether serious comorbidity present    6. Adult bronchiectasis    7. Cough in adult    8. Ex-cigarette smoker    9. RLS (restless legs syndrome)    10. Snoring    11. Mouth breathing    12. On home oxygen therapy        Plan:       Dupixent 400 mg q 14 days- administered by her .  The main side effects is injection  site reaction.  ---------------------------------------------------------------------------------  Duoneb one unit q 8 hours prn.  Trelegy 200 / 62.5 / 25-- one puff once daily.  Proaier HFA 90 mcg 2 puffs tid prn.  Reviewed inhaler technique.  -------------------------------------------------  Smith score was =-- 5.  --------------------------------------------------  Stopped--Home OXYGEN therapy- 2 Liters per minute.  -------------------------------------------------------  Pepcid 40 mg qd  Flonase 50 mcg qd or bid.  Flonase 50 mcg ad / bid  ---------------------------------    Follow up with Pulmonologist Rick Sanchez MD    In the past had PPSV- 23 and a Prevnar- 13- vaccination response - non - protective 18/ 23 serotypes.  After last PREVNAR- 20 - post titers- non - protective 5 / 23 serotypes.  ----------------------------------------------------------------------------------------------------------  Follow up in 3- 4 months                Problems Address                                                 Amount and/or Complexity                                                                      Risk       3           [] 2 or more self-limited or  minor problems                      [] Limited                                                                        [] Low                  [] 1 stable chronic illness                                                  Any combination of the two                                               OTC drugs                  []Acute, uncomplicated illness or injury                            Review of prior external notes from unique source           Minor surgery with no risk factors                                                                                                               [] 1 []2  []3+                                                                                                              Review of results from each unique test                                                                                                               [] 1 []2  [] 3+                                                                                                              Order of each unique test                                                                                                               [] 1 []2  [] 3+                                                                                                              Or                                                                                                             [] Assessment requiring an independent historian      4            [x] One or more chronic illness with exacerbation,              [x] Moderate                                                                      [x] Moderate                 Progression, or side effects of treatment                            -test documents or independent historians                        Prescription drug management                [x]  2 or more sable chronic illnesses                                    [] Independent interpretation of tests                              Minor surgery with  identifiable risk                [] 1 undiagnosed new problem with uncertain prognosis    [] Discussion or management of test results                    elective major surgery                [] 1 acute illness with                systemic symptoms                                                                                                                                                              [] 1 acute complicated injury                                                                                                                                          Elective major surgery                                                                                                                                                                                                                                                                                                                                                                                                  5            [] 1 or more chronic illnesses with severe exacerbation,     [] Extensive(two from below)                                         [] High                                                                                                               [] Independent interpretation of results                         Drug therapy requiring intensive                                                                                                               []Discussion of management or test interpretation           monitoring                                                                                                                                                                                                       Decision to de-escalate care                 [] 1 acute or chronic illness or injury that poses a threat                                                                                               Decision regarding  hospitalization

## 2024-12-03 DIAGNOSIS — F41.1 GAD (GENERALIZED ANXIETY DISORDER): ICD-10-CM

## 2024-12-03 DIAGNOSIS — G47.09 OTHER INSOMNIA: ICD-10-CM

## 2024-12-03 RX ORDER — CLONAZEPAM 1 MG/1
1 TABLET ORAL 2 TIMES DAILY
Qty: 60 TABLET | Refills: 5 | Status: SHIPPED | OUTPATIENT
Start: 2024-12-03

## 2024-12-23 ENCOUNTER — PATIENT MESSAGE (OUTPATIENT)
Dept: ADMINISTRATIVE | Facility: OTHER | Age: 64
End: 2024-12-23
Payer: MEDICARE

## 2025-01-27 ENCOUNTER — PATIENT MESSAGE (OUTPATIENT)
Dept: ADMINISTRATIVE | Facility: OTHER | Age: 65
End: 2025-01-27
Payer: MEDICARE

## 2025-02-13 ENCOUNTER — OFFICE VISIT (OUTPATIENT)
Dept: ALLERGY | Facility: CLINIC | Age: 65
End: 2025-02-13
Payer: MEDICARE

## 2025-02-13 VITALS
DIASTOLIC BLOOD PRESSURE: 85 MMHG | TEMPERATURE: 98 F | SYSTOLIC BLOOD PRESSURE: 135 MMHG | HEART RATE: 82 BPM | BODY MASS INDEX: 32.46 KG/M2 | WEIGHT: 166.25 LBS

## 2025-02-13 DIAGNOSIS — G25.81 RLS (RESTLESS LEGS SYNDROME): ICD-10-CM

## 2025-02-13 DIAGNOSIS — E66.9 OBESITY, UNSPECIFIED CLASS, UNSPECIFIED OBESITY TYPE, UNSPECIFIED WHETHER SERIOUS COMORBIDITY PRESENT: ICD-10-CM

## 2025-02-13 DIAGNOSIS — J40 BRONCHITIS: Primary | ICD-10-CM

## 2025-02-13 DIAGNOSIS — J44.89 ASTHMA-COPD OVERLAP SYNDROME: ICD-10-CM

## 2025-02-13 DIAGNOSIS — J47.9 BRONCHIECTASIS WITHOUT COMPLICATION: ICD-10-CM

## 2025-02-13 DIAGNOSIS — J82.83 EOSINOPHILIC ASTHMA: ICD-10-CM

## 2025-02-13 DIAGNOSIS — R05.9 COUGH IN ADULT: ICD-10-CM

## 2025-02-13 DIAGNOSIS — J31.0 PERENNIAL NON-ALLERGIC RHINITIS: ICD-10-CM

## 2025-02-13 PROCEDURE — 1126F AMNT PAIN NOTED NONE PRSNT: CPT | Mod: CPTII,S$GLB,, | Performed by: SPECIALIST

## 2025-02-13 PROCEDURE — 1101F PT FALLS ASSESS-DOCD LE1/YR: CPT | Mod: CPTII,S$GLB,, | Performed by: SPECIALIST

## 2025-02-13 PROCEDURE — 3075F SYST BP GE 130 - 139MM HG: CPT | Mod: CPTII,S$GLB,, | Performed by: SPECIALIST

## 2025-02-13 PROCEDURE — 1160F RVW MEDS BY RX/DR IN RCRD: CPT | Mod: CPTII,S$GLB,, | Performed by: SPECIALIST

## 2025-02-13 PROCEDURE — 99999 PR PBB SHADOW E&M-EST. PATIENT-LVL IV: CPT | Mod: PBBFAC,,, | Performed by: SPECIALIST

## 2025-02-13 PROCEDURE — 1159F MED LIST DOCD IN RCRD: CPT | Mod: CPTII,S$GLB,, | Performed by: SPECIALIST

## 2025-02-13 PROCEDURE — 4010F ACE/ARB THERAPY RXD/TAKEN: CPT | Mod: CPTII,S$GLB,, | Performed by: SPECIALIST

## 2025-02-13 PROCEDURE — 3008F BODY MASS INDEX DOCD: CPT | Mod: CPTII,S$GLB,, | Performed by: SPECIALIST

## 2025-02-13 PROCEDURE — 3288F FALL RISK ASSESSMENT DOCD: CPT | Mod: CPTII,S$GLB,, | Performed by: SPECIALIST

## 2025-02-13 PROCEDURE — 99215 OFFICE O/P EST HI 40 MIN: CPT | Mod: S$GLB,,, | Performed by: SPECIALIST

## 2025-02-13 PROCEDURE — 3079F DIAST BP 80-89 MM HG: CPT | Mod: CPTII,S$GLB,, | Performed by: SPECIALIST

## 2025-02-13 RX ORDER — AZITHROMYCIN 250 MG/1
TABLET, FILM COATED ORAL
Qty: 11 TABLET | Refills: 1 | Status: SHIPPED | OUTPATIENT
Start: 2025-02-13 | End: 2025-02-23

## 2025-02-13 NOTE — PROGRESS NOTES
"Subjective:       Patient ID: Kenya Majano is a 64 y.o. female.    Chief Complaint:    FOLLOW UP OM ASTHMA- COPD, EOSINOPHILIC ASTHMA, ADULT ONSET ASTHMA, PERENNIAL NON ALLERGIC RHINITIS, EX- CIGARETTE SMOKER.\, CHRONIC COUGH, ADULT BRONCHIECTASIS, OBESITY / RLS;.      HPI:     female, 64- year old with the above complaints for follow up visit. Overall doing well , on Dupixent 300 mg q 14 days, self- infused. Doing great on Dupixent. She is breathing better. Has better exercise tolerance, Trelegy 200- 62.5- 25 works great- Had to pay $ 600 in January 2025- The price " might come down". I suggested that I can substitute Trelegy, if there is an alternative in her formulary.  She reports that Duoneb nebulized treatment and saline neb treatments have helped very much.  She lost weight on " Mediterranean Diet ">.  In the last week, she has had a productive cough. Of course she is no longer smoking cigarettes. N O longer on Oxygen.  Wczema is well controlled.  She has perennial nasal allergy symptoms due to non- allergic rhinitis.  She is obese- BMI 32.46- I suggested she talk to her PCP regarding Mounjaro or Wegovy injections. She has bilateral knee pains and might need knee replacement.  Kenya has RLS- THAT WAS HELPED BY REQUIP. However it caused night walking and eating out to fridge without realizing it. Shold find alternative medication th treat RLS through [facility] / PULMONOLOGIST- Rick Sanchez MD. mAY REPEAT POLYSOMNOGRAM.  SHE IS RETIRED. She used to manage a motel in [place]                         Sulfa (sulfonamide antibiotics), Dimenhydrinate, Sumatriptan, and Latex - allergies were reported.      Past Medical History:   Diagnosis Date    Adult bronchiectasis 7/6/2023    Anxiety     Arthritis     Asthma     Cataract     Coronary artery disease     Emphysema of lung     Glaucoma     Hypertension     Seizures     Stroke        Family History   Problem Relation Name Age of Onset    " Diabetes Mother      Stroke Mother      Heart disease Father      Diabetes Maternal Grandmother      No Known Problems Maternal Grandfather      Hearing loss Paternal Grandmother      Hearing loss Paternal Grandfather         Environmental History: Dust Mite Controls: Dust mite controls are already in place.     Review of Systems   Constitutional:  Positive for fatigue.   HENT:  Positive for congestion, postnasal drip and rhinorrhea.    Eyes: Negative.    Respiratory:  Positive for cough and chest tightness.    Cardiovascular: Negative.    Gastrointestinal: Negative.    Endocrine: Negative.    Genitourinary: Negative.    Musculoskeletal: Negative.    Skin: Negative.    Allergic/Immunologic: Negative.    Neurological: Negative.    Hematological: Negative.    Psychiatric/Behavioral: Negative.       Objective:     Visit Vitals  LMP  (LMP Unknown)       Physical Exam  Vitals and nursing note reviewed. Exam conducted with a chaperone present.   Constitutional:       Appearance: Normal appearance. She is obese.   HENT:      Head: Normocephalic and atraumatic.      Right Ear: Tympanic membrane, ear canal and external ear normal.      Left Ear: Tympanic membrane, ear canal and external ear normal.      Nose: Congestion and rhinorrhea present.      Mouth/Throat:      Mouth: Mucous membranes are moist.      Pharynx: Oropharynx is clear.   Eyes:      Conjunctiva/sclera: Conjunctivae normal.      Pupils: Pupils are equal, round, and reactive to light.   Cardiovascular:      Rate and Rhythm: Normal rate and regular rhythm.      Pulses: Normal pulses.      Heart sounds: Normal heart sounds.   Pulmonary:      Effort: Pulmonary effort is normal.      Breath sounds: Normal breath sounds.   Abdominal:      General: Abdomen is flat. Bowel sounds are normal.      Palpations: Abdomen is soft.   Musculoskeletal:         General: Normal range of motion.      Cervical back: Normal range of motion and neck supple.   Skin:     General: Skin  is warm and dry.      Capillary Refill: Capillary refill takes less than 2 seconds.   Neurological:      General: No focal deficit present.      Mental Status: She is alert and oriented to person, place, and time. Mental status is at baseline.   Psychiatric:         Mood and Affect: Mood normal.         Behavior: Behavior normal.         Thought Content: Thought content normal.         Judgment: Judgment normal.       Assessment:      1. Asthma-COPD overlap syndrome    2. Eosinophilic asthma    3. Perennial non-allergic rhinitis    4. Cough in adult    5. RLS (restless legs syndrome)    6. Obesity, unspecified class, unspecified obesity type, unspecified whether serious comorbidity present    7. Bronchiectasis without complication    8        Ex  cigarette smoker  9        Snoring and mouth breathing  10      On home Oxygen therapy      Plan:     Azithromycin 250 mg # 11-- take 10- days course.  ------------------------------------------------------------------  Dupixent 300 mg q 14 days self administerd / .  Dupixent side effects-m arthralgia,eosinophilia and surface eye disease discussed  ------------------------------------------------------------------------------------------------------------------  Trelegy 200 / 62.5 / 25-- one puff qd  Proair HFA 90 mcg - 2 puffs tid prn  Reviewed inhaler technique.  ---------------------------------------------------------------------------------------------  Duoneb one unit tid prn  Hypertonic saline 5 % bid prn  Home Oxygen therapy 2 Liters  per minute was stopped.  -----------------------------------------------------------------------------  Pepcid 40 mg qd.  Treat all infections.  ---------------------------------------------------------------------------------  Flonase 50 mcg - qd / bid  Post Prevnar- 20- vaccination response- non - protective 5 / 23 serotypes.  Had PPSV- 23 and ONE Prevnar- 13 ( poat titers was non- protective 18 / 23 serotypes ).  Follow up  with Pulmonologist Rick Sanchez MD  RETURN TO [facility] IN 4 MONTHS                  Problems Address                                                 Amount and/or Complexity                                                                      Risk       3           [] 2 or more self-limited or minor problems                      [] Limited                                                                        [] Low                  [] 1 stable chronic illness                                                  Any combination of the two                                               OTC drugs                  []Acute, uncomplicated illness or injury                            Review of prior external notes from unique source           Minor surgery with no risk factors                                                                                                               [] 1 []2  []3+                                                                                                              Review of results from each unique test                                                                                                               [] 1 []2  [] 3+                                                                                                              Order of each unique test                                                                                                               [] 1 []2  [] 3+                                                                                                              Or                                                                                                             [] Assessment requiring an independent historian      4            [] One or more chronic illness with exacerbation,              [] Moderate                                                                      [] Moderate                 Progression, or side effects of treatment                             -test documents or independent historians                        Prescription drug management                []  2 or more sable chronic illnesses                                    [] Independent interpretation of tests                              Minor surgery with identifiable risk                [] 1 undiagnosed new problem with uncertain prognosis    [] Discussion or management of test results                    elective major surgery                [] 1 acute illness with                systemic symptoms                                                                                                                                                              [] 1 acute complicated injury                                                                                                                                          Elective major surgery                                                                                                                                                                                                                                                                                                                                                                                                  5            [x] 1 or more chronic illnesses with severe exacerbation,     [x] Extensive(two from below)                                         [x] High                                                                                                               [] Independent interpretation of results                         Drug therapy requiring intensive                                                                                                               []Discussion of management or test interpretation           monitoring                                                                                                                                                                                                        Decision to de-escalate care                 [] 1 acute or chronic illness or injury that poses a threat                                                                                               Decision regarding hospitalization

## 2025-02-22 ENCOUNTER — PATIENT MESSAGE (OUTPATIENT)
Dept: ADMINISTRATIVE | Facility: OTHER | Age: 65
End: 2025-02-22
Payer: MEDICARE

## 2025-03-23 ENCOUNTER — PATIENT MESSAGE (OUTPATIENT)
Dept: ADMINISTRATIVE | Facility: OTHER | Age: 65
End: 2025-03-23
Payer: MEDICARE

## 2025-05-22 ENCOUNTER — PATIENT MESSAGE (OUTPATIENT)
Dept: ADMINISTRATIVE | Facility: OTHER | Age: 65
End: 2025-05-22
Payer: MEDICARE

## 2025-05-26 NOTE — PROGRESS NOTES
Subjective:       Patient ID: Kenya Majano is a 64 y.o. female.    Chief Complaint:      FOLLOW UP ON ASTHMA - COPD, EOSINOPHILIC ASTHMA-- ON DUPIXENT, ADULT ONSET ASTHMA, PERENNIAL NON- ALLERGIC RHINITIS, CHRONIC COUGH- HOME OXYGEN THERAPY, BRONCHIECTASIS, OBESITY AND RLS    HPI:     female, 64- year- old- with the above complaints- for follow up visit.  Doing well on Dupixent 300 MG Q 14 days- self- administered to treat Eosinophilic Asthma,. She is doing great on this therapy.   She is also on an ICS- LABA- LAMAMay try Ozeempic  Or wegovy to help lose weight. Currently losing some weight on Mediterranean diet..  Eczema is well controlled Bosch dry skin.  She has year- round nasal and seasonal eye allergies-- [place] has Perennial Non - Allergic Rhinitis.  She is obese BMI 30.87.  RLS is treated with Requip. Dr Rick Sanchez has to find an alternate medication because of the Sleep Walking caused by REQUIP.  SHE HAS LEFT KNEE -- pain and swelling and will follow up with the knee specialist.  She has retired from her  job in [place], [place].                         Sulfa (sulfonamide antibiotics), Dimenhydrinate, Sumatriptan, and Latex -- allergies were reported.    Past Medical History:   Diagnosis Date    Adult bronchiectasis 7/6/2023    Anxiety     Arthritis     Asthma     Cataract     Coronary artery disease     Emphysema of lung     Glaucoma     Hypertension     Seizures     Stroke        Family History   Problem Relation Name Age of Onset    Diabetes Mother      Stroke Mother      Heart disease Father      Diabetes Maternal Grandmother      No Known Problems Maternal Grandfather      Hearing loss Paternal Grandmother      Hearing loss Paternal Grandfather         Environmental History: Dust Mite Controls: Dust mite controls are already in place.     Review of Systems    Objective:     Visit Vitals  LMP  (LMP Unknown)       Physical Exam      Assessment:      1. Asthma exacerbation in  COPD    2. Eosinophilic asthma    3. Cough in adult    4. RLS (restless legs syndrome)    5. Bronchiectasis without complication    6. Ex-cigarette smoker    7. Snoring    8. Mouth breathing    9. Obesity, unspecified class, unspecified obesity type, unspecified whether serious comorbidity present    10     Resless Legs Syndrome.    Plan:       DUPIXENT 300 mg q 14 days.  Dupixent side effects- Eosinophilia, athralgia, ocular surface disease and skin rash- discussed.  -------------------------------------------------------------------------------------------------------------  Trelegy 200 / 62.5/ 25-- one puff daily  Proair HFA 90 mcg 2 puffs tid prn.  Reviewed inhaler technique  --------------------------------------------------------  Hypertonic saline 5 % bid  following Duoneb one unit bid.  ----------------------------------------------------------------------------  Treat all infections.  No longer on Home Oxygen Therapy.  Had 3 --- doses of- PREVNAR-13- LAST one 01- 19-0 2021.  Was immunized with PNEUMOVAX- 23- ON 03- 06- 2024.  +Had ARXVY VACCINE.  Talk to YOSELIN Reagan- to try to get on Wegovy to lose weight. On Mediterranean diet.  Ex Cigarette Smoker- Follow up with the pulmonologist regularly to monitor for lung cancancer.  Quit smoking cigarettes  ------------------------------------------------------------------------------------  ANNUAL INFLUENZA VACCINATIONS.  Follow up in 4 months.                Problems Address                                                 Amount and/or Complexity                                                                      Risk       3           [] 2 or more self-limited or minor problems                      [] Limited                                                                        [] Low                  [] 1 stable chronic illness                                                  Any combination of the two                                               OTC  drugs                  []Acute, uncomplicated illness or injury                            Review of prior external notes from unique source           Minor surgery with no risk factors                                                                                                               [] 1 []2  []3+                                                                                                              Review of results from each unique test                                                                                                               [] 1 []2  [] 3+                                                                                                              Order of each unique test                                                                                                               [] 1 []2  [] 3+                                                                                                              Or                                                                                                             [] Assessment requiring an independent historian      4            [] One or more chronic illness with exacerbation,              [] Moderate                                                                      [] Moderate                 Progression, or side effects of treatment                            -test documents or independent historians                        Prescription drug management                []  2 or more sable chronic illnesses                                    [] Independent interpretation of tests                              Minor surgery with identifiable risk                [] 1 undiagnosed new problem with uncertain prognosis    [] Discussion or management of test results                    elective major surgery                [] 1 acute illness with                systemic symptoms                                                                                                                                                               [] 1 acute complicated injury                                                                                                                                          Elective major surgery                                                                                                                                                                                                                                                                                                                                                                                                  5            [x] 1 or more chronic illnesses with severe exacerbation,     [x] Extensive(two from below)                                         [x] High                                                                                                               [] Independent interpretation of results                         Drug therapy requiring intensive                                                                                                               []Discussion of management or test interpretation           monitoring                                                                                                                                                                                                       Decision to de-escalate care                 [] 1 acute or chronic illness or injury that poses a threat                                                                                               Decision regarding hospitalization

## 2025-05-28 ENCOUNTER — OFFICE VISIT (OUTPATIENT)
Dept: ALLERGY | Facility: CLINIC | Age: 65
End: 2025-05-28
Payer: MEDICARE

## 2025-05-28 VITALS
DIASTOLIC BLOOD PRESSURE: 74 MMHG | HEART RATE: 84 BPM | SYSTOLIC BLOOD PRESSURE: 114 MMHG | TEMPERATURE: 98 F | WEIGHT: 158.06 LBS | BODY MASS INDEX: 30.87 KG/M2

## 2025-05-28 DIAGNOSIS — E66.9 OBESITY, UNSPECIFIED CLASS, UNSPECIFIED OBESITY TYPE, UNSPECIFIED WHETHER SERIOUS COMORBIDITY PRESENT: ICD-10-CM

## 2025-05-28 DIAGNOSIS — J47.9 BRONCHIECTASIS WITHOUT COMPLICATION: ICD-10-CM

## 2025-05-28 DIAGNOSIS — R06.5 MOUTH BREATHING: ICD-10-CM

## 2025-05-28 DIAGNOSIS — R06.83 SNORING: ICD-10-CM

## 2025-05-28 DIAGNOSIS — J44.1 ASTHMA EXACERBATION IN COPD: Primary | ICD-10-CM

## 2025-05-28 DIAGNOSIS — R05.9 COUGH IN ADULT: ICD-10-CM

## 2025-05-28 DIAGNOSIS — G25.81 RLS (RESTLESS LEGS SYNDROME): ICD-10-CM

## 2025-05-28 DIAGNOSIS — Z87.891 EX-CIGARETTE SMOKER: ICD-10-CM

## 2025-05-28 DIAGNOSIS — J82.83 EOSINOPHILIC ASTHMA: ICD-10-CM

## 2025-05-28 PROCEDURE — 1159F MED LIST DOCD IN RCRD: CPT | Mod: CPTII,S$GLB,, | Performed by: SPECIALIST

## 2025-05-28 PROCEDURE — 99999 PR PBB SHADOW E&M-EST. PATIENT-LVL IV: CPT | Mod: PBBFAC,,, | Performed by: SPECIALIST

## 2025-05-28 PROCEDURE — 1125F AMNT PAIN NOTED PAIN PRSNT: CPT | Mod: CPTII,S$GLB,, | Performed by: SPECIALIST

## 2025-05-28 PROCEDURE — 3044F HG A1C LEVEL LT 7.0%: CPT | Mod: CPTII,S$GLB,, | Performed by: SPECIALIST

## 2025-05-28 PROCEDURE — 3008F BODY MASS INDEX DOCD: CPT | Mod: CPTII,S$GLB,, | Performed by: SPECIALIST

## 2025-05-28 PROCEDURE — 3288F FALL RISK ASSESSMENT DOCD: CPT | Mod: CPTII,S$GLB,, | Performed by: SPECIALIST

## 2025-05-28 PROCEDURE — 3078F DIAST BP <80 MM HG: CPT | Mod: CPTII,S$GLB,, | Performed by: SPECIALIST

## 2025-05-28 PROCEDURE — 3074F SYST BP LT 130 MM HG: CPT | Mod: CPTII,S$GLB,, | Performed by: SPECIALIST

## 2025-05-28 PROCEDURE — 1160F RVW MEDS BY RX/DR IN RCRD: CPT | Mod: CPTII,S$GLB,, | Performed by: SPECIALIST

## 2025-05-28 PROCEDURE — 99215 OFFICE O/P EST HI 40 MIN: CPT | Mod: S$GLB,,, | Performed by: SPECIALIST

## 2025-05-28 PROCEDURE — 4010F ACE/ARB THERAPY RXD/TAKEN: CPT | Mod: CPTII,S$GLB,, | Performed by: SPECIALIST

## 2025-05-28 PROCEDURE — 1101F PT FALLS ASSESS-DOCD LE1/YR: CPT | Mod: CPTII,S$GLB,, | Performed by: SPECIALIST

## 2025-07-09 DIAGNOSIS — F41.1 GAD (GENERALIZED ANXIETY DISORDER): ICD-10-CM

## 2025-07-09 DIAGNOSIS — G47.09 OTHER INSOMNIA: ICD-10-CM

## 2025-07-18 RX ORDER — CLONAZEPAM 1 MG/1
1 TABLET ORAL 2 TIMES DAILY PRN
Qty: 60 TABLET | Refills: 5 | Status: SHIPPED | OUTPATIENT
Start: 2025-07-18

## 2025-07-21 ENCOUNTER — PATIENT MESSAGE (OUTPATIENT)
Dept: ADMINISTRATIVE | Facility: OTHER | Age: 65
End: 2025-07-21
Payer: MEDICARE

## 2025-08-16 DIAGNOSIS — J41.8 MIXED SIMPLE AND MUCOPURULENT CHRONIC BRONCHITIS: ICD-10-CM

## 2025-08-18 RX ORDER — FLUTICASONE FUROATE, UMECLIDINIUM BROMIDE AND VILANTEROL TRIFENATATE 200; 62.5; 25 UG/1; UG/1; UG/1
1 POWDER RESPIRATORY (INHALATION)
Qty: 60 EACH | Refills: 11 | Status: SHIPPED | OUTPATIENT
Start: 2025-08-18

## 2025-08-20 ENCOUNTER — PATIENT MESSAGE (OUTPATIENT)
Dept: ADMINISTRATIVE | Facility: OTHER | Age: 65
End: 2025-08-20
Payer: MEDICARE

## 2025-08-27 ENCOUNTER — OFFICE VISIT (OUTPATIENT)
Dept: ALLERGY | Facility: CLINIC | Age: 65
End: 2025-08-27
Payer: MEDICARE

## 2025-08-27 VITALS
HEART RATE: 73 BPM | DIASTOLIC BLOOD PRESSURE: 71 MMHG | SYSTOLIC BLOOD PRESSURE: 115 MMHG | TEMPERATURE: 98 F | WEIGHT: 152.31 LBS | BODY MASS INDEX: 29.75 KG/M2

## 2025-08-27 DIAGNOSIS — R06.5 MOUTH BREATHING: ICD-10-CM

## 2025-08-27 DIAGNOSIS — J44.89 ASTHMA WITH COPD: ICD-10-CM

## 2025-08-27 DIAGNOSIS — J82.83 EOSINOPHILIC ASTHMA: Primary | ICD-10-CM

## 2025-08-27 DIAGNOSIS — Z87.891 EX-CIGARETTE SMOKER: ICD-10-CM

## 2025-08-27 DIAGNOSIS — E66.9 OBESITY, UNSPECIFIED CLASS, UNSPECIFIED OBESITY TYPE, UNSPECIFIED WHETHER SERIOUS COMORBIDITY PRESENT: ICD-10-CM

## 2025-08-27 DIAGNOSIS — G25.81 RLS (RESTLESS LEGS SYNDROME): ICD-10-CM

## 2025-08-27 DIAGNOSIS — J47.9 BRONCHIECTASIS WITHOUT COMPLICATION: ICD-10-CM

## 2025-08-27 DIAGNOSIS — R42 VERTIGO: ICD-10-CM

## 2025-08-27 DIAGNOSIS — R06.83 SNORING: ICD-10-CM

## 2025-08-27 DIAGNOSIS — R05.3 CHRONIC COUGH: ICD-10-CM

## 2025-08-27 PROCEDURE — 99999 PR PBB SHADOW E&M-EST. PATIENT-LVL III: CPT | Mod: PBBFAC,,, | Performed by: SPECIALIST

## 2025-08-27 RX ORDER — MECLIZINE HYDROCHLORIDE 25 MG/1
25 TABLET ORAL 2 TIMES DAILY PRN
Qty: 30 TABLET | Refills: 0 | Status: SHIPPED | OUTPATIENT
Start: 2025-08-27